# Patient Record
Sex: FEMALE | Race: WHITE | NOT HISPANIC OR LATINO | Employment: OTHER | URBAN - METROPOLITAN AREA
[De-identification: names, ages, dates, MRNs, and addresses within clinical notes are randomized per-mention and may not be internally consistent; named-entity substitution may affect disease eponyms.]

---

## 2017-01-06 ENCOUNTER — APPOINTMENT (OUTPATIENT)
Dept: OCCUPATIONAL THERAPY | Facility: CLINIC | Age: 62
End: 2017-01-06
Payer: COMMERCIAL

## 2017-01-16 ENCOUNTER — APPOINTMENT (OUTPATIENT)
Dept: OCCUPATIONAL THERAPY | Facility: CLINIC | Age: 62
End: 2017-01-16
Payer: COMMERCIAL

## 2017-01-16 PROCEDURE — 97010 HOT OR COLD PACKS THERAPY: CPT

## 2017-01-16 PROCEDURE — 97140 MANUAL THERAPY 1/> REGIONS: CPT

## 2017-01-16 PROCEDURE — 97110 THERAPEUTIC EXERCISES: CPT

## 2017-01-27 ENCOUNTER — APPOINTMENT (OUTPATIENT)
Dept: OCCUPATIONAL THERAPY | Facility: CLINIC | Age: 62
End: 2017-01-27
Payer: COMMERCIAL

## 2017-04-14 ENCOUNTER — ALLSCRIPTS OFFICE VISIT (OUTPATIENT)
Dept: OTHER | Facility: OTHER | Age: 62
End: 2017-04-14

## 2017-04-14 DIAGNOSIS — Z12.31 ENCOUNTER FOR SCREENING MAMMOGRAM FOR MALIGNANT NEOPLASM OF BREAST: ICD-10-CM

## 2017-04-14 DIAGNOSIS — I25.10 ATHEROSCLEROTIC HEART DISEASE OF NATIVE CORONARY ARTERY WITHOUT ANGINA PECTORIS: ICD-10-CM

## 2017-04-18 ENCOUNTER — LAB CONVERSION - ENCOUNTER (OUTPATIENT)
Dept: OTHER | Facility: OTHER | Age: 62
End: 2017-04-18

## 2017-04-18 LAB
A/G RATIO (HISTORICAL): 1.9 (CALC) (ref 1–2.5)
ALBUMIN SERPL BCP-MCNC: 4 G/DL (ref 3.6–5.1)
ALP SERPL-CCNC: 52 U/L (ref 33–130)
ALT SERPL W P-5'-P-CCNC: 13 U/L (ref 6–29)
AST SERPL W P-5'-P-CCNC: 13 U/L (ref 10–35)
BILIRUB SERPL-MCNC: 1 MG/DL (ref 0.2–1.2)
BUN SERPL-MCNC: 15 MG/DL (ref 7–25)
BUN/CREA RATIO (HISTORICAL): ABNORMAL (CALC) (ref 6–22)
CALCIUM SERPL-MCNC: 9.6 MG/DL (ref 8.6–10.4)
CHLORIDE SERPL-SCNC: 108 MMOL/L (ref 98–110)
CHOLEST SERPL-MCNC: 157 MG/DL (ref 125–200)
CHOLEST/HDLC SERPL: 2.1 (CALC)
CO2 SERPL-SCNC: 24 MMOL/L (ref 20–31)
CREAT SERPL-MCNC: 0.74 MG/DL (ref 0.5–0.99)
EGFR AFRICAN AMERICAN (HISTORICAL): 101 ML/MIN/1.73M2
EGFR-AMERICAN CALC (HISTORICAL): 87 ML/MIN/1.73M2
GAMMA GLOBULIN (HISTORICAL): 2.1 G/DL (CALC) (ref 1.9–3.7)
GLUCOSE (HISTORICAL): 100 MG/DL (ref 65–99)
HDLC SERPL-MCNC: 76 MG/DL
LDL CHOLESTEROL (HISTORICAL): 73 MG/DL (CALC)
NON-HDL-CHOL (CHOL-HDL) (HISTORICAL): 81 MG/DL (CALC)
POTASSIUM SERPL-SCNC: 4.7 MMOL/L (ref 3.5–5.3)
SODIUM SERPL-SCNC: 143 MMOL/L (ref 135–146)
TOTAL PROTEIN (HISTORICAL): 6.1 G/DL (ref 6.1–8.1)
TRIGL SERPL-MCNC: 41 MG/DL
TSH SERPL DL<=0.05 MIU/L-ACNC: 2 MIU/L (ref 0.4–4.5)

## 2017-04-28 ENCOUNTER — GENERIC CONVERSION - ENCOUNTER (OUTPATIENT)
Dept: OTHER | Facility: OTHER | Age: 62
End: 2017-04-28

## 2017-05-17 ENCOUNTER — HOSPITAL ENCOUNTER (OUTPATIENT)
Dept: NON INVASIVE DIAGNOSTICS | Facility: HOSPITAL | Age: 62
Discharge: HOME | End: 2017-05-17
Attending: FAMILY MEDICINE
Payer: COMMERCIAL

## 2017-05-17 DIAGNOSIS — I25.10 ATHEROSCLEROTIC HEART DISEASE OF NATIVE CORONARY ARTERY WITHOUT ANGINA PECTORIS: ICD-10-CM

## 2017-05-17 PROCEDURE — 93880 EXTRACRANIAL BILAT STUDY: CPT

## 2017-05-18 ENCOUNTER — HOSPITAL ENCOUNTER (OUTPATIENT)
Dept: MAMMOGRAPHY | Facility: HOSPITAL | Age: 62
Discharge: HOME | End: 2017-05-18
Payer: COMMERCIAL

## 2017-05-18 DIAGNOSIS — Z12.31 ENCOUNTER FOR SCREENING MAMMOGRAM FOR MALIGNANT NEOPLASM OF BREAST: ICD-10-CM

## 2017-05-18 PROCEDURE — G0202 SCR MAMMO BI INCL CAD: HCPCS

## 2017-05-31 ENCOUNTER — GENERIC CONVERSION - ENCOUNTER (OUTPATIENT)
Dept: OTHER | Facility: OTHER | Age: 62
End: 2017-05-31

## 2017-06-09 ENCOUNTER — TRANSCRIBE ORDERS (OUTPATIENT)
Dept: ADMINISTRATIVE | Facility: HOSPITAL | Age: 62
End: 2017-06-09

## 2017-06-09 ENCOUNTER — APPOINTMENT (OUTPATIENT)
Dept: LAB | Facility: HOSPITAL | Age: 62
End: 2017-06-09
Attending: INTERNAL MEDICINE
Payer: COMMERCIAL

## 2017-06-09 ENCOUNTER — ALLSCRIPTS OFFICE VISIT (OUTPATIENT)
Dept: OTHER | Facility: OTHER | Age: 62
End: 2017-06-09

## 2017-06-09 DIAGNOSIS — R73.01 IMPAIRED FASTING GLUCOSE: ICD-10-CM

## 2017-06-09 DIAGNOSIS — I25.10 ATHEROSCLEROTIC HEART DISEASE OF NATIVE CORONARY ARTERY WITHOUT ANGINA PECTORIS: ICD-10-CM

## 2017-06-09 LAB
EST. AVERAGE GLUCOSE BLD GHB EST-MCNC: 108 MG/DL
HBA1C MFR BLD: 5.4 % (ref 4.2–6.3)

## 2017-06-09 PROCEDURE — 83036 HEMOGLOBIN GLYCOSYLATED A1C: CPT

## 2017-06-09 PROCEDURE — 36415 COLL VENOUS BLD VENIPUNCTURE: CPT

## 2017-06-11 ENCOUNTER — GENERIC CONVERSION - ENCOUNTER (OUTPATIENT)
Dept: OTHER | Facility: OTHER | Age: 62
End: 2017-06-11

## 2017-09-21 ENCOUNTER — GENERIC CONVERSION - ENCOUNTER (OUTPATIENT)
Dept: OTHER | Facility: OTHER | Age: 62
End: 2017-09-21

## 2017-09-28 ENCOUNTER — HOSPITAL ENCOUNTER (EMERGENCY)
Facility: HOSPITAL | Age: 62
Discharge: HOME | End: 2017-09-28
Admitting: EMERGENCY MEDICINE
Payer: COMMERCIAL

## 2017-09-28 VITALS
DIASTOLIC BLOOD PRESSURE: 66 MMHG | TEMPERATURE: 97.6 F | HEART RATE: 63 BPM | RESPIRATION RATE: 16 BRPM | HEIGHT: 64 IN | OXYGEN SATURATION: 100 % | WEIGHT: 141.98 LBS | SYSTOLIC BLOOD PRESSURE: 121 MMHG | BODY MASS INDEX: 24.24 KG/M2

## 2017-09-28 DIAGNOSIS — K52.1 DIARRHEA DUE TO DRUG: Primary | ICD-10-CM

## 2017-09-28 DIAGNOSIS — R10.84 GENERALIZED ABDOMINAL PAIN: ICD-10-CM

## 2017-09-28 LAB
ALBUMIN SERPL BCP-MCNC: 3.2 G/DL (ref 3.5–5)
ALP SERPL-CCNC: 44 U/L (ref 46–116)
ALT SERPL W P-5'-P-CCNC: 26 U/L (ref 12–78)
ANION GAP SERPL CALCULATED.3IONS-SCNC: 7 MMOL/L (ref 4–13)
AST SERPL W P-5'-P-CCNC: 18 U/L (ref 5–45)
BACTERIA UR QL AUTO: ABNORMAL /HPF
BASOPHILS # BLD AUTO: 0.11 THOUSANDS/ΜL (ref 0–0.1)
BASOPHILS NFR BLD AUTO: 1 % (ref 0–1)
BILIRUB SERPL-MCNC: 0.3 MG/DL (ref 0.2–1)
BILIRUB UR QL STRIP: NEGATIVE
BUN SERPL-MCNC: 10 MG/DL (ref 5–25)
CALCIUM SERPL-MCNC: 8.9 MG/DL (ref 8.3–10.1)
CHLORIDE SERPL-SCNC: 107 MMOL/L (ref 100–108)
CLARITY UR: CLEAR
CLARITY, POC: CLEAR
CO2 SERPL-SCNC: 27 MMOL/L (ref 21–32)
COLOR UR: YELLOW
COLOR, POC: YELLOW
CREAT SERPL-MCNC: 0.69 MG/DL (ref 0.6–1.3)
EOSINOPHIL # BLD AUTO: 0.38 THOUSAND/ΜL (ref 0–0.61)
EOSINOPHIL NFR BLD AUTO: 5 % (ref 0–6)
ERYTHROCYTE [DISTWIDTH] IN BLOOD BY AUTOMATED COUNT: 13.3 % (ref 11.6–15.1)
EXT BILIRUBIN, UA: NEGATIVE
EXT BLOOD URINE: NEGATIVE
EXT GLUCOSE, UA: NEGATIVE
EXT KETONES: NEGATIVE
EXT NITRITE, UA: NEGATIVE
EXT PH, UA: 7
EXT PROTEIN, UA: NEGATIVE
EXT SPECIFIC GRAVITY, UA: 1.01
EXT UROBILINOGEN: 0.2
GFR SERPL CREATININE-BSD FRML MDRD: 94 ML/MIN/1.73SQ M
GLUCOSE SERPL-MCNC: 95 MG/DL (ref 65–140)
GLUCOSE UR STRIP-MCNC: NEGATIVE MG/DL
HCT VFR BLD AUTO: 41.9 % (ref 34.8–46.1)
HGB BLD-MCNC: 13.6 G/DL (ref 11.5–15.4)
HGB UR QL STRIP.AUTO: NEGATIVE
KETONES UR STRIP-MCNC: NEGATIVE MG/DL
LEUKOCYTE ESTERASE UR QL STRIP: ABNORMAL
LIPASE SERPL-CCNC: 78 U/L (ref 73–393)
LYMPHOCYTES # BLD AUTO: 2.43 THOUSANDS/ΜL (ref 0.6–4.47)
LYMPHOCYTES NFR BLD AUTO: 30 % (ref 14–44)
MAGNESIUM SERPL-MCNC: 1.8 MG/DL (ref 1.6–2.6)
MCH RBC QN AUTO: 28.8 PG (ref 26.8–34.3)
MCHC RBC AUTO-ENTMCNC: 32.5 G/DL (ref 31.4–37.4)
MCV RBC AUTO: 89 FL (ref 82–98)
MONOCYTES # BLD AUTO: 0.68 THOUSAND/ΜL (ref 0.17–1.22)
MONOCYTES NFR BLD AUTO: 8 % (ref 4–12)
NEUTROPHILS # BLD AUTO: 4.61 THOUSANDS/ΜL (ref 1.85–7.62)
NEUTS SEG NFR BLD AUTO: 56 % (ref 43–75)
NITRITE UR QL STRIP: NEGATIVE
NON-SQ EPI CELLS URNS QL MICRO: ABNORMAL /HPF
PH UR STRIP.AUTO: 7 [PH] (ref 4.5–8)
PLATELET # BLD AUTO: 216 THOUSANDS/UL (ref 149–390)
PMV BLD AUTO: 10.2 FL (ref 8.9–12.7)
POTASSIUM SERPL-SCNC: 4 MMOL/L (ref 3.5–5.3)
PROT SERPL-MCNC: 6.1 G/DL (ref 6.4–8.2)
PROT UR STRIP-MCNC: NEGATIVE MG/DL
RBC # BLD AUTO: 4.73 MILLION/UL (ref 3.81–5.12)
RBC #/AREA URNS AUTO: ABNORMAL /HPF
SODIUM SERPL-SCNC: 141 MMOL/L (ref 136–145)
SP GR UR STRIP.AUTO: 1.01 (ref 1–1.03)
URATE CRY URNS QL MICRO: ABNORMAL /HPF
UROBILINOGEN UR QL STRIP.AUTO: 0.2 E.U./DL
WBC # BLD AUTO: 8.21 THOUSAND/UL (ref 4.31–10.16)
WBC # BLD EST: NORMAL 10*3/UL
WBC #/AREA URNS AUTO: ABNORMAL /HPF

## 2017-09-28 PROCEDURE — 81002 URINALYSIS NONAUTO W/O SCOPE: CPT

## 2017-09-28 PROCEDURE — 83690 ASSAY OF LIPASE: CPT | Performed by: PHYSICIAN ASSISTANT

## 2017-09-28 PROCEDURE — 80053 COMPREHEN METABOLIC PANEL: CPT | Performed by: PHYSICIAN ASSISTANT

## 2017-09-28 PROCEDURE — 85025 COMPLETE CBC W/AUTO DIFF WBC: CPT | Performed by: PHYSICIAN ASSISTANT

## 2017-09-28 PROCEDURE — 36415 COLL VENOUS BLD VENIPUNCTURE: CPT | Performed by: PHYSICIAN ASSISTANT

## 2017-09-28 PROCEDURE — 81001 URINALYSIS AUTO W/SCOPE: CPT | Performed by: PHYSICIAN ASSISTANT

## 2017-09-28 PROCEDURE — 99284 EMERGENCY DEPT VISIT MOD MDM: CPT

## 2017-09-28 PROCEDURE — 96361 HYDRATE IV INFUSION ADD-ON: CPT

## 2017-09-28 PROCEDURE — 96374 THER/PROPH/DIAG INJ IV PUSH: CPT

## 2017-09-28 PROCEDURE — 83735 ASSAY OF MAGNESIUM: CPT | Performed by: PHYSICIAN ASSISTANT

## 2017-09-28 PROCEDURE — 96375 TX/PRO/DX INJ NEW DRUG ADDON: CPT

## 2017-09-28 RX ORDER — DIPHENOXYLATE HYDROCHLORIDE AND ATROPINE SULFATE 2.5; .025 MG/1; MG/1
1 TABLET ORAL 4 TIMES DAILY PRN
Qty: 20 TABLET | Refills: 0 | Status: SHIPPED | OUTPATIENT
Start: 2017-09-28 | End: 2017-10-03

## 2017-09-28 RX ORDER — KETOROLAC TROMETHAMINE 30 MG/ML
30 INJECTION, SOLUTION INTRAMUSCULAR; INTRAVENOUS ONCE
Status: COMPLETED | OUTPATIENT
Start: 2017-09-28 | End: 2017-09-28

## 2017-09-28 RX ORDER — ONDANSETRON 4 MG/1
4 TABLET, ORALLY DISINTEGRATING ORAL EVERY 6 HOURS PRN
Qty: 20 TABLET | Refills: 0 | Status: SHIPPED | OUTPATIENT
Start: 2017-09-28 | End: 2018-07-18

## 2017-09-28 RX ORDER — DIPHENOXYLATE HYDROCHLORIDE AND ATROPINE SULFATE 2.5; .025 MG/1; MG/1
1 TABLET ORAL ONCE
Status: COMPLETED | OUTPATIENT
Start: 2017-09-28 | End: 2017-09-28

## 2017-09-28 RX ORDER — LEVOTHYROXINE SODIUM 0.07 MG/1
75 TABLET ORAL DAILY
COMMUNITY
End: 2018-02-20 | Stop reason: SDUPTHER

## 2017-09-28 RX ORDER — ONDANSETRON 2 MG/ML
4 INJECTION INTRAMUSCULAR; INTRAVENOUS ONCE
Status: COMPLETED | OUTPATIENT
Start: 2017-09-28 | End: 2017-09-28

## 2017-09-28 RX ADMIN — ONDANSETRON 4 MG: 2 INJECTION INTRAMUSCULAR; INTRAVENOUS at 10:16

## 2017-09-28 RX ADMIN — SODIUM CHLORIDE 1000 ML: 0.9 INJECTION, SOLUTION INTRAVENOUS at 09:44

## 2017-09-28 RX ADMIN — DIPHENOXYLATE HYDROCHLORIDE AND ATROPINE SULFATE 1 TABLET: 2.5; .025 TABLET ORAL at 09:45

## 2017-09-28 RX ADMIN — KETOROLAC TROMETHAMINE 30 MG: 30 INJECTION, SOLUTION INTRAMUSCULAR at 09:45

## 2017-09-28 NOTE — ED PROVIDER NOTES
History  Chief Complaint   Patient presents with    Abdominal Pain     Pt c/o of diarrhea that started 5 days ago and now has abd pain mid epi gastric  Pt states last week she was prescribed augmentin and has had GI problems since  History provided by:  Patient   used: No    Abdominal Pain   Pain location:  Epigastric and LUQ  Pain quality: cramping and gnawing    Pain radiates to:  Does not radiate  Pain severity:  Moderate  Onset quality:  Gradual  Duration:  5 days  Timing:  Constant  Progression:  Worsening  Chronicity:  New  Context: not alcohol use, not awakening from sleep, not diet changes, not eating, not laxative use, not medication withdrawal, not previous surgeries, not recent illness, not recent sexual activity, not recent travel, not retching, not sick contacts, not suspicious food intake and not trauma    Context comment:  The patient was started on Augmentin for sinusitis 5 days ago, states that she started to have these abdominal symptoms in GI problems 5 days ago when she started the antibiotic  Associated symptoms: diarrhea    Associated symptoms: no anorexia, no belching, no chest pain, no chills, no constipation, no cough, no dysuria, no fatigue, no fever, no flatus, no hematemesis, no hematochezia, no hematuria, no melena, no nausea, no shortness of breath, no sore throat, no vaginal bleeding, no vaginal discharge and no vomiting    Risk factors: aspirin and multiple surgeries    Risk factors: no alcohol abuse, not elderly, no NSAID use, not obese, not pregnant and no recent hospitalization        Prior to Admission Medications   Prescriptions Last Dose Informant Patient Reported? Taking?    Cholecalciferol (VITAMIN D PO)   Yes Yes   Sig: Take by mouth   LORazepam (ATIVAN) 0 5 mg tablet   Yes No   Sig: Take 0 5 mg by mouth every 6 (six) hours as needed for anxiety   Omega-3 Fatty Acids (FISH OIL PO)   Yes No   Sig: Take by mouth   aspirin 81 MG tablet   Yes Yes Sig: Take 81 mg by mouth daily   atenolol (TENORMIN) 25 mg tablet   Yes Yes   Sig: Take 25 mg by mouth daily   levothyroxine 75 mcg tablet   Yes Yes   Sig: Take 75 mcg by mouth daily   rosuvastatin (CRESTOR) 20 MG tablet   Yes No   Sig: Take 20 mg by mouth daily      Facility-Administered Medications: None       Past Medical History:   Diagnosis Date    Anxiety     Coronary artery disease     Disease of thyroid gland        Past Surgical History:   Procedure Laterality Date    BREAST SURGERY      open biopsy    CORONARY ANGIOPLASTY WITH STENT PLACEMENT      GANGLION CYST EXCISION Right 12/8/2016    Procedure: EXCISION GANGLION CYST right first dorsal extensor compartment;  Surgeon: Dk Manuel MD;  Location:  MAIN OR;  Service:    Kumar De La Cruzman INCIS TENDON SHEATH,RADIAL STYLOID Right 12/8/2016    Procedure: Isi Mai;  Surgeon: Dk Manuel MD;  Location: [facility];  Service: Orthopedics    TONSILLECTOMY         Family History   Problem Relation Age of Onset    Heart disease Father      I have reviewed and agree with the history as documented  Social History   Substance Use Topics    Smoking status: Never Smoker    Smokeless tobacco: Not on file    Alcohol use No        Review of Systems   Constitutional: Positive for appetite change  Negative for chills, diaphoresis, fatigue and fever  HENT: Positive for sinus pressure  Negative for congestion, ear pain, sore throat and trouble swallowing  Eyes: Negative for photophobia and visual disturbance  Respiratory: Negative for cough, chest tightness, shortness of breath and wheezing  Cardiovascular: Negative for chest pain  Gastrointestinal: Positive for abdominal pain and diarrhea  Negative for anorexia, constipation, flatus, hematemesis, hematochezia, melena, nausea and vomiting  Genitourinary: Negative for dysuria, hematuria, vaginal bleeding and vaginal discharge     Musculoskeletal: Negative for myalgias and neck stiffness  Skin: Negative for color change, pallor and rash  Neurological: Negative for dizziness, weakness, light-headedness, numbness and headaches  Hematological: Negative for adenopathy  Physical Exam  ED Triage Vitals [09/28/17 0909]   Temperature Pulse Respirations Blood Pressure SpO2   97 6 °F (36 4 °C) 76 18 144/67 99 %      Temp Source Heart Rate Source Patient Position - Orthostatic VS BP Location FiO2 (%)   Oral Monitor Lying Right arm --      Pain Score       6           Physical Exam   Constitutional: She is oriented to person, place, and time  She appears well-developed and well-nourished  No distress  HENT:   Head: Normocephalic and atraumatic  Right Ear: External ear normal    Left Ear: External ear normal    Nose: Nose normal    Mouth/Throat: Mucous membranes are dry  Neck: Normal range of motion  Neck supple  Cardiovascular: Normal rate, regular rhythm, normal heart sounds and intact distal pulses  Exam reveals no friction rub  No murmur heard  Pulmonary/Chest: Effort normal and breath sounds normal  No respiratory distress  She has no wheezes  Abdominal: Soft  Bowel sounds are normal    Mild generalized tenderness to palpation on abdominal exam   Lymphadenopathy:     She has no cervical adenopathy  Neurological: She is alert and oriented to person, place, and time  She exhibits normal muscle tone  Coordination normal    Skin: Skin is warm and dry  Capillary refill takes less than 2 seconds  No rash noted  She is not diaphoretic  No pallor         ED Medications  Medications   sodium chloride 0 9 % bolus 1,000 mL (0 mL Intravenous Stopped 9/28/17 1044)   ketorolac (TORADOL) 30 mg/mL injection 30 mg (30 mg Intravenous Given 9/28/17 0945)   diphenoxylate-atropine (LOMOTIL) 2 5-0 025 mg per tablet 1 tablet (1 tablet Oral Given 9/28/17 0945)   ondansetron (ZOFRAN) injection 4 mg (4 mg Intravenous Given 9/28/17 1016)       Diagnostic Studies  Labs Reviewed   UA W REFLEX TO MICROSCOPIC WITH REFLEX TO CULTURE - Abnormal        Result Value Ref Range Status    Leukocytes, UA Small (*) Negative Final    Color, UA Yellow   Final    Clarity, UA Clear   Final    Specific [place], UA 1 010  1 003 - 1 030 Final    pH, UA 7 0  4 5 - 8 0 Final    Nitrite, UA Negative  Negative Final    Protein, UA Negative  Negative mg/dl Final    Glucose, UA Negative  Negative mg/dl Final    Ketones, UA Negative  Negative mg/dl Final    Urobilinogen, UA 0 2  0 2, 1 0 E U /dl E U /dl Final    Bilirubin, UA Negative  Negative Final    Blood, UA Negative  Negative Final   CBC AND DIFFERENTIAL - Abnormal     Basophils Absolute 0 11 (*) 0 00 - 0 10 Thousands/µL Final    WBC 8 21  4 31 - 10 16 Thousand/uL Final    RBC 4 73  3 81 - 5 12 Million/uL Final    Hemoglobin 13 6  11 5 - 15 4 g/dL Final    Hematocrit 41 9  34 8 - 46 1 % Final    MCV 89  82 - 98 fL Final    MCH 28 8  26 8 - 34 3 pg Final    MCHC 32 5  31 4 - 37 4 g/dL Final    RDW 13 3  11 6 - 15 1 % Final    MPV 10 2  8 9 - 12 7 fL Final    Platelets 129  941 - 390 Thousands/uL Final    Neutrophils Relative 56  43 - 75 % Final    Lymphocytes Relative 30  14 - 44 % Final    Monocytes Relative 8  4 - 12 % Final    Eosinophils Relative 5  0 - 6 % Final    Basophils Relative 1  0 - 1 % Final    Neutrophils Absolute 4 61  1 85 - 7 62 Thousands/µL Final    Lymphocytes Absolute 2 43  0 60 - 4 47 Thousands/µL Final    Monocytes Absolute 0 68  0 17 - 1 22 Thousand/µL Final    Eosinophils Absolute 0 38  0 00 - 0 61 Thousand/µL Final   COMPREHENSIVE METABOLIC PANEL - Abnormal     Alkaline Phosphatase 44 (*) 46 - 116 U/L Final    Total Protein 6 1 (*) 6 4 - 8 2 g/dL Final    Albumin 3 2 (*) 3 5 - 5 0 g/dL Final    Sodium 141  136 - 145 mmol/L Final    Potassium 4 0  3 5 - 5 3 mmol/L Final    Chloride 107  100 - 108 mmol/L Final    CO2 27  21 - 32 mmol/L Final    Anion Gap 7  4 - 13 mmol/L Final    BUN 10  5 - 25 mg/dL Final    Creatinine 0 69  0 60 - 1 30 mg/dL Final Comment: Standardized to IDMS reference method    Glucose 95  65 - 140 mg/dL Final    Comment:   If the patient is fasting, the ADA then defines impaired fasting glucose as > 100 mg/dL and diabetes as > or equal to 123 mg/dL  Specimen collection should occur prior to Sulfasalazine administration due to the potential for falsely depressed results  Specimen collection should occur prior to Sulfapyridine administration due to the potential for falsely elevated results  Calcium 8 9  8 3 - 10 1 mg/dL Final    AST 18  5 - 45 U/L Final    Comment:   Specimen collection should occur prior to Sulfasalazine administration due to the potential for falsely depressed results  ALT 26  12 - 78 U/L Final    Comment:   Specimen collection should occur prior to Sulfasalazine administration due to the potential for falsely depressed results  Total Bilirubin 0 30  0 20 - 1 00 mg/dL Final    eGFR 94  ml/min/1 73sq m Final    Narrative:     National Kidney Disease Education Program recommendations are as follows:  GFR calculation is accurate only with a steady state creatinine  Chronic Kidney disease less than 60 ml/min/1 73 sq  meters  Kidney failure less than 15 ml/min/1 73 sq  meters     URINE MICROSCOPIC - Abnormal     RBC, UA 0-1 (*) None Seen, 0-5 /hpf Final    WBC, UA 2-4 (*) None Seen, 0-5, 5-55, 5-65 /hpf Final    Epithelial Cells Occasional  None Seen, Occasional /hpf Final    Bacteria, UA Occasional  None Seen, Occasional /hpf Final    Uric Acid Otilia, UA Occasional  /hpf Final   MAGNESIUM - Normal    Magnesium 1 8  1 6 - 2 6 mg/dL Final   LIPASE - Normal    Lipase 78  73 - 393 u/L Final   POCT URINALYSIS DIPSTICK - Normal    Color, UA yellow   Final    Clarity, UA clear   Final    EXT Glucose, UA negative   Final    EXT Bilirubin, UA (Ref: Negative) negative   Final    EXT Ketones, UA (Ref: Negative) negative   Final    EXT Spec Grav, UA 1 010   Final    EXT Blood, UA (Ref: Negative) negative   Final    EXT pH, UA 7 0   Final    EXT Protein, UA (Ref: Negative) negative   Final    EXT Urobilinogen, UA (Ref: 0 2- 1 0) 0 2   Final    EXT Leukocytes, UA (Ref: Negative) MODERATE   Final    EXT Nitrite, UA (Ref: Negative) negative   Final       No orders to display       Procedures  Procedures      Phone Contacts  ED Phone Contact    ED Course  ED Course                                MDM  Number of Diagnoses or Management Options  Diarrhea due to drug: new and requires workup  Generalized abdominal pain: new and requires workup  Diagnosis management comments: The patient's lab results were unremarkable  She was discharged in no acute distress with supportive therapy and outpatient script for stool cultures and C diff testing  She was instructed to follow up with her PCP if no improvement in 3 4 days, or return to the ED if worsening symptoms develop  Amount and/or Complexity of Data Reviewed  Clinical lab tests: ordered and reviewed  Review and summarize past medical records: yes  Discuss the patient with other providers: yes      CritCare Time    Disposition  Final diagnoses:   Diarrhea due to drug   Generalized abdominal pain     ED Disposition     ED Disposition Condition Comment    Discharge  [address] discharge to home/self care      Condition at discharge: Stable        Follow-up Information     Follow up With Specialties Details Why Contact Info Additional [address] Emergency Department Emergency Medicine Go to If symptoms worsen [address] [facility]  AN ED, [address], [place], [place], [address]    Sam Dumont MD Family Medicine  If no improvement in 3-4 days [address] [facility] [address] [phone]           Discharge Medication List as of 9/28/2017 10:34 AM      START taking these medications    Details   diphenoxylate-atropine (LOMOTIL) 2 5-0 025 mg per tablet Take 1 tablet by mouth 4 (four) times a day as needed for diarrhea for up to 5 days, Starting u 9/28/2017, Until Tue 10/3/2017, Print      ondansetron (ZOFRAN-ODT) 4 mg disintegrating tablet Take 1 tablet by mouth every 6 (six) hours as needed for nausea or vomiting, Starting u 9/28/2017, Print         CONTINUE these medications which have NOT CHANGED    Details   aspirin 81 MG tablet Take 81 mg by mouth daily, Until Discontinued, Historical Med      atenolol (TENORMIN) 25 mg tablet Take 25 mg by mouth daily, Until Discontinued, Historical Med      Cholecalciferol (VITAMIN D PO) Take by mouth, Until Discontinued, Historical Med      levothyroxine 75 mcg tablet Take 75 mcg by mouth daily, Historical Med      LORazepam (ATIVAN) 0 5 mg tablet Take 0 5 mg by mouth every 6 (six) hours as needed for anxiety, Until Discontinued, Historical Med      Omega-3 Fatty Acids (FISH OIL PO) Take by mouth, Until Discontinued, Historical Med      rosuvastatin (CRESTOR) 20 MG tablet Take 20 mg by mouth daily, Until Discontinued, Historical Med             Outpatient Discharge Orders  FECAL LEUKOCYTES   Standing Status: Future  Standing Exp  Date: 09/28/18     Giardia antigen   Standing Status: Future  Standing Exp  Date: 09/28/18     Stool culture   Standing Status: Future  Standing Exp  Date: 09/28/18     Clostridium difficile toxin by PCR   Standing Status: Future  Standing Exp   Date: 09/28/18         ED Provider  Electronically Signed by       Desiree Nails PA-C  09/28/17 9460

## 2017-09-28 NOTE — DISCHARGE INSTRUCTIONS
Acute Diarrhea   WHAT YOU NEED TO KNOW:   Acute diarrhea starts quickly and lasts a short time, usually 1 to 3 days  It can last up to 2 weeks  You may not be able to control your diarrhea  Acute diarrhea usually stops on its own  DISCHARGE INSTRUCTIONS:   Return to the emergency department if:   · You feel confused  · Your heartbeat is faster than normal      · Your eyes look deeply sunken, or you have no tears when you cry  · You urinate less than usual, or your urine is dark yellow  · You have blood or mucus in your stools  · You have severe abdominal pain  · You are unable to drink any liquids  Contact your healthcare provider if:   · Your symptoms do not get better with treatment  · You have a fever higher than 101 3°F (38 5°C)  · You have trouble eating and drinking because you are vomiting  · You are thirsty or have a dry mouth  · Your diarrhea does not get better in 7 days  · You have questions or concerns about your condition or care  Follow up with your healthcare provider as directed:  Write down your questions so you remember to ask them during your visits  Medicines:  · Diarrhea medicine  is an over-the-counter medicine that helps slow or stop your diarrhea  If you take other medicines, talk to your healthcare provider before you take diarrhea medicine  · Antibiotics  may be given to help treat an infection caused by bacteria  · Antiparasitics  may be given to treat an infection caused by parasites  · Take your medicine as directed  Contact your healthcare provider if you think your medicine is not helping or if you have side effects  Tell him of her if you are allergic to any medicine  Keep a list of the medicines, vitamins, and herbs you take  Include the amounts, and when and why you take them  Bring the list or the pill bottles to follow-up visits  Carry your medicine list with you in case of an emergency    Self-care:   · Drink liquids as directed  Liquids will help prevent dehydration caused by diarrhea  Ask your healthcare provider how much liquid to drink each day and which liquids are best for you  You may need to drink an oral rehydration solution (ORS)  An ORS has the right amounts of water, salts, and sugar you need to replace body fluids  You can buy an ORS at most grocery stores and pharmacies  · Eat foods that are easy to digest   Examples include rice, lentils, cereal, bananas, potatoes, and bread  It also includes some fruits (bananas, melon), well-cooked vegetables, and lean meats  Avoid foods high in fiber, fat, and sugar  Also avoid caffeine, alcohol, dairy, and red meat until your diarrhea is gone  Prevent acute diarrhea:   · Wash your hands often  Use soap and water  Wash your hands before you eat or prepare food  Also wash your hands after you use the bathroom  Use an alcohol-based hand gel when soap and water are not available  · Keep bathroom surfaces clean  This helps prevent the spread of germs that cause acute diarrhea  · Wash fruits and vegetables well before you eat them  This can help remove germs that cause diarrhea  If possible, remove the skin from fruits and vegetables, or cook them well before you eat them  · Cook meat as directed  ¨ Cook ground meat  to 160°F      ¨ Cook ground poultry, whole poultry, or cuts of poultry  to at least 165°F  Remove the meat from heat  Let it stand for 3 minutes before you eat it  ¨ Cook whole cuts of meat other than poultry  to at least 145°F  Remove the meat from heat  Let it stand for 3 minutes before you eat it  · Wash dishes that have touched raw meat with hot water and soap  This includes cutting boards, utensils, dishes, and serving containers  · Place raw or cooked meat in the refrigerator as soon as possible  Bacteria can grow in meat that is left at room temperature too long  · Do not eat raw or undercooked oysters, clams, or mussels  These foods may be contaminated and cause infection  · Drink filtered or treated water only when you travel  Do not put ice in your drinks  Drink bottled water whenever possible  © 2017 [address] [employer] [employer] Information is for End User's use only and may not be sold, redistributed or otherwise used for commercial purposes  All illustrations and images included in CareNotes® are the copyrighted property of [employer]  or [employer]  The above information is an  only  It is not intended as medical advice for individual conditions or treatments  Talk to your doctor, nurse or pharmacist before following any medical regimen to see if it is safe and effective for you

## 2017-10-02 ENCOUNTER — GENERIC CONVERSION - ENCOUNTER (OUTPATIENT)
Dept: OTHER | Facility: OTHER | Age: 62
End: 2017-10-02

## 2017-10-02 ENCOUNTER — TRANSCRIBE ORDERS (OUTPATIENT)
Dept: LAB | Facility: CLINIC | Age: 62
End: 2017-10-02

## 2017-10-02 ENCOUNTER — APPOINTMENT (OUTPATIENT)
Dept: LAB | Facility: CLINIC | Age: 62
End: 2017-10-02
Payer: COMMERCIAL

## 2017-10-02 DIAGNOSIS — R19.7 DIARRHEA, UNSPECIFIED TYPE: Primary | ICD-10-CM

## 2017-10-02 DIAGNOSIS — R19.7 DIARRHEA, UNSPECIFIED TYPE: ICD-10-CM

## 2017-10-02 PROCEDURE — 87045 FECES CULTURE AEROBIC BACT: CPT

## 2017-10-02 PROCEDURE — 87015 SPECIMEN INFECT AGNT CONCNTJ: CPT

## 2017-10-02 PROCEDURE — 87046 STOOL CULTR AEROBIC BACT EA: CPT

## 2017-10-06 LAB
BACTERIA STL CULT: NORMAL
BACTERIA STL CULT: NORMAL

## 2017-10-11 ENCOUNTER — GENERIC CONVERSION - ENCOUNTER (OUTPATIENT)
Dept: OTHER | Facility: OTHER | Age: 62
End: 2017-10-11

## 2017-11-11 ENCOUNTER — ALLSCRIPTS OFFICE VISIT (OUTPATIENT)
Dept: OTHER | Facility: OTHER | Age: 62
End: 2017-11-11

## 2017-11-12 NOTE — PROGRESS NOTES
Assessment    1  Right maxillary sinusitis (473 0) (J32 0)   2  Retinopathy (362 10) (H35 00)   3  Acute diarrhea (787 91) (R19 7)   4  Arteriosclerosis of coronary artery (414 00) (I25 10)    Plan  Acute diarrhea, Right maxillary sinusitis    · Fluconazole 200 MG Oral Tablet; TAKE 1 TABLET DAILY  Right maxillary sinusitis    · Cefuroxime Axetil 500 MG Oral Tablet; TAKE 1 TABLET EVERY 12 HOURS DAILY   · CT SINUS WO CONTRAST; Status:Need Information - Financial Authorization; Requested for:11Nov2017;     Discussion/Summary    In summary then this is a 69-year-old woman who developed acute gastrointestinal syndrome after treatment with Augmentin  Attempts to obtain C diff toxin titer were not successful due to consistency of stool  Routine bacterial cultures were negative  Symptoms at this point have essentially resolved  She does appear to have a maxillary/ethmoidal sinusitis on the right  We are going to have her try Ceftin 500 b i d  She is also going to use probiotics and will also give her some Diflucan 2 you should she develop any GI symptomatology  She did have some issues with prolonged antibiotic treatment when she was treated for Lyme disease and she was given Diflucan which appeared to remedy her symptoms  We agreed to give her prescription for seen  She is going to be beginning immunotherapy with Humira for her hereditary retinal dystrophy  Need to have her sinusitis resolved  I believe that based on her prolonged symptoms and persistent findings that a CT scan of sinuses is appropriate to verify the diagnosis as well as determine the extent  Will attempt to have this authorized  In the meantime she is going to start the Ceftin number follow up with her next week  Chief Complaint  I still have pressure, pain and swelling in my face  Indicates R maxilla  Began after starting CellCept  Only had 3 doses of Augmentin  Had GI sx  that improved only 1 week ago  Took omeprazole  No loose stools or fever  Appetite diminished  Stopped omeprazole due to hair loss  No nasal d/c  R sided obstruction  Has PND  Otalgia R sided  Minimal cough and no fever  Off Cellcept wants to start Humira  History of Present Illness  The patient is a 50-year-old female here for follow-up of sinusitis symptoms  She was seen several weeks ago with what appeared to be a right maxillary sinusitis and given Augmentin  Within 2-3 days she developed abdominal pain and diarrhea  She had to stop the Augmentin  Her gastro intestinal symptoms subsided only slowly  Just recently she has returned to normal in regards to her GI symptoms  She continues to have right facial pain in the ethmoidal and maxillary region  She has had some symptoms of nasal obstruction  She also has some postnasal drip  Ophthalmology/rheumatology is considering starting her on Humira for her retinal disorder but she cannot start this until she is free of her sinusitis  She has had no fever and minimal cough  Review of Systems   Constitutional: feeling tired, but-- no fever,-- no recent weight gain-- and-- no recent weight loss  ENT: nasal discharge, but-- as noted in HPI  Cardiovascular: no chest pain-- and-- no lower extremity edema  Respiratory: no shortness of breath  Active Problems  1  Abnormal fasting glucose (790 29) (R73 01)   2  Acute diarrhea (787 91) (R19 7)   3  Anxiety (300 00) (F41 9)   4  Arteriosclerosis of coronary artery (414 00) (I25 10)   5  GERD without esophagitis (530 81) (K21 9)   6  Hypothyroidism, unspecified type (244 9) (E03 9)   7  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 01) (G47 01)   8  History of Presence of stent in LAD coronary artery (V45 82) (Z95 5)   9  Retinopathy (362 10) (H35 00)   10  Right maxillary sinusitis (473 0) (J32 0)   11  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)    Past Medical History  1  History of Ganglion cyst of wrist, right (727 41) (M67 431)   2   History of Presence of stent in LAD coronary artery (V45 82) (Z95 5)   3  History of Status post de Quervain's release surgery (V45 89) (U71 614)    Surgical History  1  History of Biopsy Breast Open   2  History of Tonsillectomy    Social History     · Never a smoker    Current Meds   1  Aspirin 81 MG CAPS; Therapy: (Recorded:58Ghz3636) to Recorded   2  Atenolol 25 MG Oral Tablet; take 1 and 1/2 tablets by mouth once daily; Therapy: 19Fak1452 to (Evaluate:10Mar2018)  Requested for: 65Edr4906; Last Rx:31Ldo2854 Ordered   3  Cefuroxime Axetil 500 MG Oral Tablet; TAKE 1 TABLET EVERY 12 HOURS DAILY; Therapy: 09XWJ2975 to (Evaluate:10Oct2017)  Requested for: 87JAP8618; Last Rx:84Dvz0055 Ordered   4  Co Q10 100 MG TABS; Therapy: (Recorded:68Njo0488) to Recorded   5  Eszopiclone 2 MG Oral Tablet; Take 1 tablet by mouth at bedtime; Therapy: 78XLE5754 to (Evaluate:89Rqh7916)  Requested for: 55WBA4367; Last Rx:55Dee3842 Ordered   6  Feosol 200 (65 Fe) MG Oral Tablet; Therapy: (Recorded:18Bwr5202) to Recorded   7  Fish Oil OIL; Therapy: (Recorded:53Ubm1275) to Recorded   8  LORazepam 0 5 MG Oral Tablet; Take 1 tablet daily  Requested for: 21Apr2017; Last Rx:48Lwi9127; Status: ACTIVE - Renewal Denied Ordered   9  LORazepam 0 5 MG Oral Tablet; TAKE 1 TABLET DAILY; Therapy: ([phone]) to Recorded   10  MetroNIDAZOLE 500 MG Oral Tablet; TAKE 1 TABLET 3 times daily; Therapy: 95WRG4427 to (Evaluate:10Fba1581)  Requested for: 57Psx0094; Last  Rx:94Cch6649 Ordered   11  Nitrostat 0 4 MG Sublingual Tablet Sublingual; DISSOLVE 1 TABLET UNDER THE  TONGUE AS NEEDED FOR CHEST PAIN;  Therapy: (Recorded:03Ekl9783) to Recorded   12  RaNITidine HCl - 300 MG Oral Tablet; TAKE 1 TABLET AT BEDTIME; Therapy: 84Bfq8120 to (Evaluate:11Oct2017)  Requested for: 69Iko6561; Last  Rx:81Vcs2389 Ordered   13  Rosuvastatin Calcium 20 MG Oral Tablet; take 1 tablet by mouth for 5 days and 2 tablets  by mouth for 2 days;   Therapy: 43OJR4665 to (Evaluate:10Mar2018)  Requested for: 22Aug4284; Last Rx:04Yal2506 Ordered   14  Synthroid 75 MCG Oral Tablet; take 1 tablet by mouth once daily; Therapy: 29HJY0677 to (988 76 832)  Requested for: 46WPO6431; Last  TJ:03HBK7272 Ordered   15  Vitamin B-12 1000 MCG Oral Tablet; Therapy: (Recorded:71Nqg9303) to Recorded   16  Vitamin D TABS; Therapy: (Recorded:21Nmw5489) to Recorded   17  Xifaxan 200 MG Oral Tablet; TAKE 1 TABLET 3 TIMES DAILY FOR 3 DAYS AS NEEDED  FOR DIARRHEA; Therapy: 74Qva0677 to (Evaluate:68Apq6067)  Requested for: 28Apr2017; Last  Rx:23Wke8038 Ordered    Allergies  1  Augmentin TABS    Vitals  Vital Signs    Recorded: 48EQF6969 11:48AM   Temperature 24 9 F   Systolic 506   Diastolic 78   Height 5 ft 3 in   Weight 145 lb    BMI Calculated 25 69   BSA Calculated 1 69       Physical Exam   Constitutional  General appearance: Abnormal  -- Appears fatigued  Eyes  Conjunctiva and lids: No swelling, erythema or discharge  Ears, Nose, Mouth, and Throat  External inspection of ears and nose: Abnormal  -- There specific tenderness of the right ethmoidal and right maxillary region  Extraocular movements are full there is no facial swelling present  Otoscopic examination: Tympanic membranes translucent with normal light reflex  Canals patent without erythema  Nasal mucosa, septum, and turbinates: Abnormal  -- Intranasal examination reveals it to be relatively normal on the left  Right side reveals significant erythema as well as edema and there is purulent appearing nasal discharge in the right nasal cavity  Pulmonary  Respiratory effort: No increased work of breathing or signs of respiratory distress  Auscultation of lungs: Clear to auscultation  -- Clear to auscultation  Cardiovascular  Auscultation of heart: Normal rate and rhythm, normal S1 and S2, without murmurs  -- Regular rhythm and a normal rate  Lymphatic  Palpation of lymph nodes in neck: No lymphadenopathy  -- No adenopathy    Psychiatric  Orientation to person, place, and time: Normal    Mood and affect: Abnormal  -- Mildly dysphoric affect  Future Appointments    Date/Time Provider Specialty Site   11/30/2017 02:20 PM POLO Aviles   Family Medicine [address] [place] [address]       Signatures   Electronically signed by : POLO Uribe ; Nov 11 2017 12:39PM EST                       (Author)

## 2017-12-04 ENCOUNTER — TRANSCRIBE ORDERS (OUTPATIENT)
Dept: ADMINISTRATIVE | Facility: HOSPITAL | Age: 62
End: 2017-12-04

## 2017-12-04 DIAGNOSIS — J32.0 RIGHT MAXILLARY SINUSITIS: Primary | ICD-10-CM

## 2017-12-13 ENCOUNTER — HOSPITAL ENCOUNTER (OUTPATIENT)
Dept: CT IMAGING | Facility: HOSPITAL | Age: 62
Discharge: HOME | End: 2017-12-13
Payer: COMMERCIAL

## 2017-12-13 ENCOUNTER — GENERIC CONVERSION - ENCOUNTER (OUTPATIENT)
Dept: OTHER | Facility: OTHER | Age: 62
End: 2017-12-13

## 2017-12-13 DIAGNOSIS — J32.0 RIGHT MAXILLARY SINUSITIS: ICD-10-CM

## 2017-12-13 PROCEDURE — 70486 CT MAXILLOFACIAL W/O DYE: CPT

## 2017-12-21 ENCOUNTER — GENERIC CONVERSION - ENCOUNTER (OUTPATIENT)
Dept: OTHER | Facility: OTHER | Age: 62
End: 2017-12-21

## 2018-01-10 NOTE — RESULT NOTES
Message   Please tell her that her blood work looks good including lipid profile and thyroid function  She also had normal CBC and CMP     Verified Results  (1) COMPREHENSIVE METABOLIC PANEL 22NOF0457 89:84LI Lora Em     Test Name Result Flag Reference   GLUCOSE 86 mg/dL  65-99   Fasting reference interval   UREA NITROGEN (BUN) 22 mg/dL  7-25   CREATININE 0 73 mg/dL  0 50-0 99   For patients >52years of age, the reference limit  for Creatinine is approximately 13% higher for people  identified as -American  eGFR NON-AFR  AMERICAN 89 mL/min/1 73m2  > OR = 60   eGFR AFRICAN AMERICAN 103 mL/min/1 73m2  > OR = 60   BUN/CREATININE RATIO   7-63   NOT APPLICABLE (calc)   SODIUM 141 mmol/L  135-146   POTASSIUM 4 9 mmol/L  3 5-5 3   CHLORIDE 104 mmol/L     CARBON DIOXIDE 26 mmol/L  20-31   CALCIUM 10 0 mg/dL  8 6-10 4   PROTEIN, TOTAL 6 7 g/dL  6 1-8 1   ALBUMIN 4 6 g/dL  3 6-5 1   GLOBULIN 2 1 g/dL (calc)  1 9-3 7   ALBUMIN/GLOBULIN RATIO 2 2 (calc)  1 0-2 5   BILIRUBIN, TOTAL 0 7 mg/dL  0 2-1 2   ALKALINE PHOSPHATASE 72 U/L     AST 17 U/L  10-35   ALT 17 U/L  6-29     (1) LIPID PANEL, FASTING 06Cro5234 12:00AM Masood Gutierrez     Test Name Result Flag Reference   CHOLESTEROL, TOTAL 193 mg/dL  125-200   HDL CHOLESTEROL 91 mg/dL  > OR = 46   TRIGLICERIDES 52 mg/dL  <174   LDL-CHOLESTEROL 92 mg/dL (calc)  <130   Desirable range <100 mg/dL for patients with CHD or  diabetes and <70 mg/dL for diabetic patients with  known heart disease  CHOL/HDLC RATIO 2 1 (calc)  < OR = 5 0   NON HDL CHOLESTEROL 102 mg/dL (calc)     Target for non-HDL cholesterol is 30 mg/dL higher than   LDL cholesterol target       (Q) CBC (H/H, RBC, INDICES, WBC, PLT) 53Udg5644 12:00AM Masood Gutierrez     Test Name Result Flag Reference   WHITE BLOOD CELL COUNT 5 4 Thousand/uL  3 8-10 8   RED BLOOD CELL COUNT 4 91 Million/uL  3 80-5 10   HEMOGLOBIN 14 3 g/dL  11 7-15 5   HEMATOCRIT 43 8 %  35 0-45 0   MCV 89 4 fL  80 0-100 0   MCH 29 1 pg  27 0-33 0   MCHC 32 6 g/dL  32 0-36 0   RDW 13 4 %  11 0-15 0   PLATELET COUNT 828 Thousand/uL  140-400   MPV 9 1 fL  7 5-11 5     (Q) THYROID PANEL 41Nkj3119 12:00AM Masood Gutierrez     Test Name Result Flag Reference   T3 UPTAKE 31 %  22-35   T4 (THYROXINE), TOTAL 8 8 mcg/dL  4 5-12 0   FREE T4 INDEX (T7) 2 7  1 4-3 8     (Q) TSH, 3RD GENERATION W/REFLEX TO FT4 12Sep2016 12:00AM Masood Gutierrez     Test Name Result Flag Reference   TSH W/REFLEX TO FT4 2 10 mIU/L  0 40-4 50

## 2018-01-11 NOTE — RESULT NOTES
Verified Results  * MAMMO SCREENING BILATERAL W CAD 68SPF4803 07:52AM Jeanne Pantoja Order Number: [id number]    - Patient Instructions: To schedule this appointment, please contact Central Scheduling at [phone]  Do not wear any perfume, powder, lotion or deodorant on breast or underarm area  Please bring your doctors order, referral (if needed) and insurance information with you on the day of the test  Failure to bring this information may result in this test being rescheduled  Arrive 15 minutes prior to your appointment time to register  On the day of your test, please bring any prior mammogram or breast studies with you that were not performed at a [facility]  Failure to bring prior exams may result in your test needing to be rescheduled  Test Name Result Flag Reference   MAMMO SCREENING BILATERAL W CAD (Report)     Patient History:   Patient is postmenopausal    No known family history of cancer  Patient has never smoked  Patient's BMI is 26 4  Reason for exam: screening, asymptomatic  Mammo Screening Bilateral W CAD: May 18, 2017 - Check In #:    [de-identified]   Bilateral CC and MLO view(s) were taken  Technologist: IDALIA García (IDALIA)(M)   Prior study comparison: August 20, 2015, bilateral mammogram,    performed at [facility]  December 18, 2013, diagnostic mammogram, performed at [facility]  June 22, 2012, bilateral mammogram,    performed at [facility]  There are scattered fibroglandular densities  The parenchymal pattern appears stable  No dominant soft tissue    mass or suspicious calcifications are noted  The skin and nipple   contours are within normal limits  No mammographic evidence of malignancy  No    significant changes when compared with prior studies       ACR BI-RADSï¾® Assessments: BiRad:1 - Negative     Recommendation:   Routine screening mammogram in 1 year  A reminder letter will be   scheduled  Analyzed by CAD     8-10% of cancers will be missed on mammography  Management of a    palpable abnormality must be based on clinical grounds  Patients   will be notified of their results via letter from our facility  Accredited by [employer] of Radiology and FDA  Transcription Location: IDALIA Holcomb 98: [id number]     Risk Value(s):   Tyrer-Cuzick 10 Year: 3 500%, Tyrer-Cuzick Lifetime: 8 100%,    Myriad Table: 1 5%, DIANA 5 Year: 1 7%, NCI Lifetime: 7 8%   Signed by:   Ivone Gallardo MD   5/31/17     VAS CAROTID COMPLETE STUDY 94YQT4224 01:59PM Omar Gave Order Number: [id number]    - Patient Instructions: To schedule this appointment, please contact Central Scheduling at [phone]  Test Name Result Flag Reference   VAS CAROTID COMPLETE STUDY (Report)     [facility] REPORT   CLINICAL:   Indications: Patient presents for a general health evaluation secondary to   other cardiovascular symptoms  Patient is asymptomatic from a cerebral vascular   standpoint  Risk Factors   The patient has history of hyperlipidemia  Clinical   Right Brachial Pressure: 110/70 mmHg     Left Brachial Pressure: 110/70   mmHg  FINDINGS:      Right    Impression PSV EDV (cm/s) Ratio    Dist  ICA         57     17  0 64    Mid  ICA         49     16  0 55    Prox  ICA  Normal    63     14  0 71    Dist CCA         84     25        Mid CCA          89     16  1 13    Prox CCA         79     17        Ext Carotid        89      9  1 00    Prox Vert         47     15        Subclavian        102      0           Left     Impression PSV EDV (cm/s) Ratio    Dist  ICA        110     44  1 15    Mid  ICA         75     31  0 79    Prox   ICA  Normal    68     27  0 71    Dist CCA         92     32        Mid CCA          95     27  0 78    Prox CCA         123     33        Ext Carotid        78     21  0 82    Prox Vert         55 17        Subclavian        98      0                 CONCLUSION:   Impression   RIGHT:   There is no evidence of significant stenosis noted  Vertebral artery flow is antegrade  There is no significant subclavian artery   disease  LEFT:   There is no evidence of significant stenosis noted  Vertebral artery flow is antegrade  There is no significant subclavian artery   disease  Internal carotid artery stenosis determination by consensus criteria from:   Bebo Guerrero et al  Carotid Artery Stenosis: Gray-Scale and Doppler US Diagnosis   - Society of Radiologists in [address] [place] [address], Radiology 2003;   [phone]        SIGNATURE:   Electronically Signed by: Mohan Caldwell MD on 2017-05-18 09:47:58 AM

## 2018-01-12 VITALS
DIASTOLIC BLOOD PRESSURE: 62 MMHG | SYSTOLIC BLOOD PRESSURE: 110 MMHG | BODY MASS INDEX: 28.34 KG/M2 | WEIGHT: 160 LBS | HEART RATE: 69 BPM

## 2018-01-12 NOTE — RESULT NOTES
Verified Results  * XR WRIST 3+ VIEW RIGHT 00Dgv2087 12:46PM Yu Alexander Order Number: [id number]     Test Name Result Flag Reference   XR WRIST 3+ VW RIGHT (Report)     RIGHT WRIST     INDICATION:  Right wrist pain  COMPARISON: None     VIEWS: 4; 4 images     FINDINGS:     There is no acute fracture or dislocation  No degenerative changes  No lytic or blastic lesions are seen  Soft tissues are unremarkable  IMPRESSION:     No acute osseous abnormality         Workstation performed: [id number]     Signed by:   Rm Vaca MD   9/17/16       Plan  Wrist pain, chronic, right    · *1 - [facility] Physician Referral  Consult  Status: Active  Requested  for: 63PNG2352  Care Summary provided  : Yes

## 2018-01-13 VITALS
TEMPERATURE: 98.6 F | HEIGHT: 63 IN | DIASTOLIC BLOOD PRESSURE: 80 MMHG | BODY MASS INDEX: 27.64 KG/M2 | WEIGHT: 156 LBS | SYSTOLIC BLOOD PRESSURE: 140 MMHG

## 2018-01-13 VITALS
SYSTOLIC BLOOD PRESSURE: 112 MMHG | BODY MASS INDEX: 25.69 KG/M2 | HEIGHT: 63 IN | WEIGHT: 145 LBS | TEMPERATURE: 98.7 F | DIASTOLIC BLOOD PRESSURE: 78 MMHG

## 2018-01-13 NOTE — RESULT NOTES
Verified Results  (1) HEMOGLOBIN A1C 68CTX2733 10:51AM Mirna Riding   TW Order Number: [id number]     Test Name Result Flag Reference   HEMOGLOBIN A1C 5 4 %  4 2-6 3   EST  AVG   GLUCOSE 108 mg/dl

## 2018-01-15 NOTE — RESULT NOTES
Verified Results  (1) STOOL CULTURE 82BGX3519 06:08PM Lethaniel Na     Test Name Result Flag Reference   CLINICAL REPORT (Report)     Test:        Stool culture  Specimen Source:  Rectum  Specimen Type:   Stool  Specimen Date:   10/2/2017 6:08 PM  Result Date:    10/6/2017 11:09 AM  Result Status:   Final result  Resulting Lab:   [facility] [address] [address]            Tel: [phone]      CULTURE                                       ------------------                                   No Salmonella, Shigella or Campylobacter isolated  Shiga Toxin 1 NOT detected, Shiga Toxin 2 NOT detected

## 2018-01-16 NOTE — RESULT NOTES
Verified Results  (1) COMPREHENSIVE METABOLIC PANEL [id number] 67:71PF Luis Guillen     Test Name Result Flag Reference   GLUCOSE 100 mg/dL H 65-99   Fasting reference interval     For someone without known diabetes, a glucose value  between 100 and 125 mg/dL is consistent with  prediabetes and should be confirmed with a  follow-up test    UREA NITROGEN (BUN) 15 mg/dL  7-25   CREATININE 0 74 mg/dL  0 50-0 99   For patients >52years of age, the reference limit  for Creatinine is approximately 13% higher for people  identified as -American  eGFR NON-AFR  AMERICAN 87 mL/min/1 73m2  > OR = 60   eGFR AFRICAN AMERICAN 101 mL/min/1 73m2  > OR = 60   BUN/CREATININE RATIO   3-01   NOT APPLICABLE (calc)   SODIUM 143 mmol/L  135-146   POTASSIUM 4 7 mmol/L  3 5-5 3   CHLORIDE 108 mmol/L     CARBON DIOXIDE 24 mmol/L  20-31   CALCIUM 9 6 mg/dL  8 6-10 4   PROTEIN, TOTAL 6 1 g/dL  6 1-8 1   ALBUMIN 4 0 g/dL  3 6-5 1   GLOBULIN 2 1 g/dL (calc)  1 9-3 7   ALBUMIN/GLOBULIN RATIO 1 9 (calc)  1 0-2 5   BILIRUBIN, TOTAL 1 0 mg/dL  0 2-1 2   ALKALINE PHOSPHATASE 52 U/L     AST 13 U/L  10-35   ALT 13 U/L  6-29     (1) LIPID PANEL, FASTING 14Apr2017 12:00AM Masood Gutierrez     Test Name Result Flag Reference   CHOLESTEROL, TOTAL 157 mg/dL  125-200   HDL CHOLESTEROL 76 mg/dL  > OR = 46   TRIGLICERIDES 41 mg/dL  <182   LDL-CHOLESTEROL 73 mg/dL (calc)  <130   Desirable range <100 mg/dL for patients with CHD or  diabetes and <70 mg/dL for diabetic patients with  known heart disease  CHOL/HDLC RATIO 2 1 (calc)  < OR = 5 0   NON HDL CHOLESTEROL 81 mg/dL (calc)     Target for non-HDL cholesterol is 30 mg/dL higher than   LDL cholesterol target       (Q) CBC (H/H, RBC, INDICES, WBC, PLT) 14Apr2017 12:00AM Masood Gutierrez     Test Name Result Flag Reference   WHITE BLOOD CELL COUNT 6 2 Thousand/uL  3 8-10 8   RED BLOOD CELL COUNT 4 67 Million/uL  3 80-5 10   HEMOGLOBIN 13 6 g/dL  11 7-15 5   HEMATOCRIT 41 2 %  35 0-45 0   MCV 88 3 fL  80 0-100 0   MCH 29 1 pg  27 0-33 0   MCHC 32 9 g/dL  32 0-36 0   RDW 13 3 %  11 0-15 0   PLATELET COUNT 207 Thousand/uL  140-400   WE RECEIVED YOUR HANDWRITTEN TEST ORDER AND  PERFORMED A HEMOGRAM WITH A PLATELET WITHOUT  A DIFFERENTIAL  IF THIS IS NOT WHAT YOU INTENDED  TO ORDER, PLEASE CONTACT YOUR LOCAL CLIENT SERVICE  REPRESENTATIVE IMMEDIATELY SO THAT WE CAN   ADJUST OUR BILLING APPROPRIATELY  YOU MAY ALSO   INQUIRE ABOUT ALTERNATIVE OR ADDITIONAL TESTING     MPV 9 1 fL  7 5-12 5           (Q) TSH, 3RD GENERATION W/REFLEX TO FT4 14Apr2017 12:00AM Masood Gutierrez     Test Name Result Flag Reference   TSH W/REFLEX TO FT4 2 00 mIU/L  0 40-4 50

## 2018-01-22 VITALS
DIASTOLIC BLOOD PRESSURE: 82 MMHG | SYSTOLIC BLOOD PRESSURE: 128 MMHG | BODY MASS INDEX: 24.27 KG/M2 | HEIGHT: 63 IN | WEIGHT: 137 LBS | TEMPERATURE: 97.9 F

## 2018-01-22 VITALS
BODY MASS INDEX: 24.8 KG/M2 | SYSTOLIC BLOOD PRESSURE: 130 MMHG | TEMPERATURE: 98.4 F | WEIGHT: 140 LBS | DIASTOLIC BLOOD PRESSURE: 80 MMHG | HEIGHT: 63 IN

## 2018-01-23 NOTE — RESULT NOTES
Verified Results  CT SINUS WO CONTRAST [id number] 05:53AM Abigail Malloy     Test Name Result Flag Reference   CT SINUS WO CONTRAST (Report)     CT SINUSES     INDICATION: Chronic maxillary sinusitis  COMPARISON: None  TECHNIQUE: Axial CT imaging through the sinuses was performed  In addition, sagittal and coronal reformatted images were submitted for interpretation  Radiation dose length product (DLP) for this visit: 365 mGy-cm   This examination, like all CT scans performed in the [facility], was performed utilizing techniques to minimize radiation dose exposure, including the use of iterative    reconstruction and automated exposure control  IMAGE QUALITY: Diagnostic  FINDINGS:      FRONTAL SINUSES: Clear  ETHMOID AIR CELLS: Clear  MAXILLARY SINUSES: Clear  SPHENOID SINUSES: Clear  NASAL SEPTUM AND CAVITY: Deviation of the septum towards the right  Normal nasal cavity  ANTERIOR OSTEOMEATAL COMPLEX: Patent  OSSEOUS STRUCTURES: No bony erosion or destruction  Normal cribiform plate and Planum Spendoidale  Visulaized mastoid temporal bones are clear  The bony walls of the carotid canals are intact  SOFT TISSUES: Normal        IMPRESSION:     Clear paranasal sinuses  Deviation the nasal septum towards the right         Workstation performed: [id number]     Signed by:   Leena Oliveira DO   12/13/17

## 2018-02-20 DIAGNOSIS — E03.9 HYPOTHYROIDISM, UNSPECIFIED TYPE: Primary | ICD-10-CM

## 2018-02-20 DIAGNOSIS — I25.10 ARTERIOSCLEROSIS OF CORONARY ARTERY: ICD-10-CM

## 2018-02-20 PROBLEM — G93.9 BRAIN LESION: Status: ACTIVE | Noted: 2017-06-19

## 2018-02-20 PROBLEM — G47.01 INSOMNIA DUE TO MEDICAL CONDITION: Status: ACTIVE | Noted: 2017-08-14

## 2018-02-20 PROBLEM — H35.00 RETINOPATHY OF BOTH EYES: Status: ACTIVE | Noted: 2017-05-02

## 2018-02-20 PROBLEM — B94.8 POST-LYME DISEASE SYNDROME: Status: ACTIVE | Noted: 2017-05-02

## 2018-02-20 PROBLEM — K21.9 GERD WITHOUT ESOPHAGITIS: Status: ACTIVE | Noted: 2017-04-14

## 2018-02-20 PROBLEM — G89.4 CHRONIC PAIN DISORDER: Status: ACTIVE | Noted: 2017-05-11

## 2018-02-20 RX ORDER — LEVOTHYROXINE SODIUM 75 MCG
TABLET ORAL
Qty: 90 TABLET | Refills: 1 | Status: SHIPPED | OUTPATIENT
Start: 2018-02-20 | End: 2018-05-04 | Stop reason: SDUPTHER

## 2018-02-20 RX ORDER — ATENOLOL 25 MG/1
TABLET ORAL
Qty: 135 TABLET | Refills: 1 | Status: SHIPPED | OUTPATIENT
Start: 2018-02-20 | End: 2018-03-15 | Stop reason: SDUPTHER

## 2018-03-15 ENCOUNTER — OFFICE VISIT (OUTPATIENT)
Dept: FAMILY MEDICINE CLINIC | Facility: CLINIC | Age: 63
End: 2018-03-15
Payer: COMMERCIAL

## 2018-03-15 VITALS
BODY MASS INDEX: 27.28 KG/M2 | TEMPERATURE: 98.2 F | WEIGHT: 154 LBS | DIASTOLIC BLOOD PRESSURE: 62 MMHG | SYSTOLIC BLOOD PRESSURE: 115 MMHG

## 2018-03-15 DIAGNOSIS — G47.01 INSOMNIA DUE TO MEDICAL CONDITION: ICD-10-CM

## 2018-03-15 DIAGNOSIS — F41.9 ANXIETY: ICD-10-CM

## 2018-03-15 DIAGNOSIS — E03.9 HYPOTHYROIDISM, UNSPECIFIED TYPE: Primary | ICD-10-CM

## 2018-03-15 DIAGNOSIS — E78.5 HYPERLIPIDEMIA, UNSPECIFIED HYPERLIPIDEMIA TYPE: ICD-10-CM

## 2018-03-15 DIAGNOSIS — I25.10 ARTERIOSCLEROSIS OF CORONARY ARTERY: ICD-10-CM

## 2018-03-15 DIAGNOSIS — H30.123: ICD-10-CM

## 2018-03-15 DIAGNOSIS — K21.9 GERD WITHOUT ESOPHAGITIS: ICD-10-CM

## 2018-03-15 PROBLEM — H53.413 SCOTOMA INVOLVING CENTRAL AREA OF BOTH EYES: Status: ACTIVE | Noted: 2017-12-07

## 2018-03-15 PROBLEM — H40.043 STEROID RESPONDERS TO GLAUCOMA OF BOTH EYES: Status: ACTIVE | Noted: 2017-12-07

## 2018-03-15 PROBLEM — H53.19 PHOTOPSIA: Status: ACTIVE | Noted: 2017-12-07

## 2018-03-15 PROBLEM — H53.143 PHOTOPHOBIA OF BOTH EYES: Status: ACTIVE | Noted: 2017-12-07

## 2018-03-15 PROBLEM — H43.393 VITREOUS FLOATERS OF BOTH EYES: Status: ACTIVE | Noted: 2017-12-07

## 2018-03-15 PROBLEM — G50.0 TRIGEMINAL NEURALGIA PAIN: Status: ACTIVE | Noted: 2017-12-07

## 2018-03-15 PROBLEM — H35.53: Status: ACTIVE | Noted: 2017-04-06

## 2018-03-15 LAB
ALBUMIN SERPL-MCNC: 4 G/DL (ref 3.6–5.1)
ALBUMIN/GLOB SERPL: 1.8 (CALC) (ref 1–2.5)
ALP SERPL-CCNC: 58 U/L (ref 33–130)
ALT SERPL-CCNC: 17 U/L (ref 6–29)
AST SERPL-CCNC: 16 U/L (ref 10–35)
BASOPHILS # BLD AUTO: 61 CELLS/UL (ref 0–200)
BASOPHILS NFR BLD AUTO: 1 %
BILIRUB SERPL-MCNC: 0.9 MG/DL (ref 0.2–1.2)
BUN SERPL-MCNC: 18 MG/DL (ref 7–25)
BUN/CREAT SERPL: NORMAL (CALC) (ref 6–22)
CALCIUM SERPL-MCNC: 9.3 MG/DL (ref 8.6–10.4)
CHLORIDE SERPL-SCNC: 107 MMOL/L (ref 98–110)
CO2 SERPL-SCNC: 29 MMOL/L (ref 20–31)
CREAT SERPL-MCNC: 0.71 MG/DL (ref 0.5–0.99)
CRP SERPL-MCNC: 1.4 MG/L
EOSINOPHIL # BLD AUTO: 281 CELLS/UL (ref 15–500)
EOSINOPHIL NFR BLD AUTO: 4.6 %
ERYTHROCYTE [DISTWIDTH] IN BLOOD BY AUTOMATED COUNT: 13 % (ref 11–15)
ERYTHROCYTE [SEDIMENTATION RATE] IN BLOOD BY WESTERGREN METHOD: 28 MM/H
GLOBULIN SER CALC-MCNC: 2.2 G/DL (CALC) (ref 1.9–3.7)
GLUCOSE SERPL-MCNC: 88 MG/DL (ref 65–99)
HCT VFR BLD AUTO: 39.3 % (ref 35–45)
HGB BLD-MCNC: 12.9 G/DL (ref 11.7–15.5)
LYMPHOCYTES # BLD AUTO: 2129 CELLS/UL (ref 850–3900)
LYMPHOCYTES NFR BLD AUTO: 34.9 %
MCH RBC QN AUTO: 29.3 PG (ref 27–33)
MCHC RBC AUTO-ENTMCNC: 32.8 G/DL (ref 32–36)
MCV RBC AUTO: 89.1 FL (ref 80–100)
MONOCYTES # BLD AUTO: 573 CELLS/UL (ref 200–950)
MONOCYTES NFR BLD AUTO: 9.4 %
NEUTROPHILS # BLD AUTO: 3056 CELLS/UL (ref 1500–7800)
NEUTROPHILS NFR BLD AUTO: 50.1 %
PLATELET # BLD AUTO: 235 THOUSAND/UL (ref 140–400)
PMV BLD REES-ECKER: 10.4 FL (ref 7.5–12.5)
POTASSIUM SERPL-SCNC: 3.9 MMOL/L (ref 3.5–5.3)
PROT SERPL-MCNC: 6.2 G/DL (ref 6.1–8.1)
RBC # BLD AUTO: 4.41 MILLION/UL (ref 3.8–5.1)
SL AMB EGFR AFRICAN AMERICAN: 105 ML/MIN/1.73M2
SL AMB EGFR NON AFRICAN AMERICAN: 91 ML/MIN/1.73M2
SODIUM SERPL-SCNC: 142 MMOL/L (ref 135–146)
WBC # BLD AUTO: 6.1 THOUSAND/UL (ref 3.8–10.8)

## 2018-03-15 PROCEDURE — 99214 OFFICE O/P EST MOD 30 MIN: CPT | Performed by: FAMILY MEDICINE

## 2018-03-15 RX ORDER — ATOVAQUONE AND PROGUANIL HYDROCHLORIDE 250; 100 MG/1; MG/1
TABLET, FILM COATED ORAL
COMMUNITY
Start: 2016-11-04 | End: 2018-03-15

## 2018-03-15 RX ORDER — GABAPENTIN 100 MG/1
100 CAPSULE ORAL
COMMUNITY
End: 2018-03-15 | Stop reason: ALTCHOICE

## 2018-03-15 RX ORDER — UBIDECARENONE 10 MG
CAPSULE ORAL
COMMUNITY
End: 2018-03-15

## 2018-03-15 RX ORDER — ROSUVASTATIN CALCIUM 20 MG/1
20 TABLET, COATED ORAL DAILY
Qty: 30 TABLET | Refills: 5 | Status: SHIPPED | OUTPATIENT
Start: 2018-03-15 | End: 2018-03-19

## 2018-03-15 RX ORDER — METRONIDAZOLE 500 MG/1
1 TABLET ORAL 3 TIMES DAILY
COMMUNITY
Start: 2017-10-02 | End: 2018-03-15 | Stop reason: ALTCHOICE

## 2018-03-15 RX ORDER — AZITHROMYCIN 500 MG/1
TABLET, FILM COATED ORAL
COMMUNITY
Start: 2016-11-04 | End: 2018-03-15

## 2018-03-15 RX ORDER — RANITIDINE 300 MG/1
300 TABLET ORAL
Qty: 30 TABLET | Refills: 5 | Status: SHIPPED | OUTPATIENT
Start: 2018-03-15 | End: 2019-01-28 | Stop reason: SDUPTHER

## 2018-03-15 RX ORDER — GLUCOSAMINE HCL 500 MG
TABLET ORAL
COMMUNITY
End: 2020-04-14 | Stop reason: ALTCHOICE

## 2018-03-15 RX ORDER — UBIDECARENONE 75 MG
CAPSULE ORAL
COMMUNITY
End: 2018-03-15

## 2018-03-15 RX ORDER — LANOLIN ALCOHOL/MO/W.PET/CERES
CREAM (GRAM) TOPICAL
COMMUNITY
End: 2020-12-11 | Stop reason: ALTCHOICE

## 2018-03-15 RX ORDER — ASPIRIN 81 MG/1
TABLET ORAL
COMMUNITY
End: 2018-03-15

## 2018-03-15 RX ORDER — RIBOFLAVIN (VITAMIN B2) 100 MG
500 TABLET ORAL DAILY
COMMUNITY
End: 2020-12-07 | Stop reason: ALTCHOICE

## 2018-03-15 RX ORDER — RANITIDINE 300 MG/1
1 TABLET ORAL
COMMUNITY
Start: 2017-04-14 | End: 2018-03-15 | Stop reason: SDUPTHER

## 2018-03-15 RX ORDER — NITROGLYCERIN 0.4 MG/1
1 TABLET SUBLINGUAL
COMMUNITY
End: 2019-05-20 | Stop reason: SDUPTHER

## 2018-03-15 RX ORDER — CEFUROXIME AXETIL 500 MG/1
500 TABLET ORAL 2 TIMES DAILY
COMMUNITY
End: 2018-03-15

## 2018-03-15 RX ORDER — LORAZEPAM 0.5 MG/1
0.5 TABLET ORAL
Qty: 30 TABLET | Refills: 5 | OUTPATIENT
Start: 2018-03-15 | End: 2019-07-19 | Stop reason: SDUPTHER

## 2018-03-15 RX ORDER — ESZOPICLONE 2 MG/1
1 TABLET, FILM COATED ORAL
COMMUNITY
Start: 2017-03-13 | End: 2018-04-02 | Stop reason: ALTCHOICE

## 2018-03-15 RX ORDER — ATENOLOL 25 MG/1
37.5 TABLET ORAL DAILY
Qty: 135 TABLET | Refills: 0 | Status: SHIPPED | OUTPATIENT
Start: 2018-03-15 | End: 2018-03-20 | Stop reason: SDUPTHER

## 2018-03-15 RX ORDER — FLUCONAZOLE 200 MG/1
1 TABLET ORAL DAILY
COMMUNITY
Start: 2017-11-11 | End: 2018-03-15 | Stop reason: ALTCHOICE

## 2018-03-15 NOTE — ASSESSMENT & PLAN NOTE
Will get T3-T4 and TSH today to evaluate her thyroid status  She may be somewhat under replaced by history and examination today

## 2018-03-15 NOTE — PROGRESS NOTES
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  She is going to begin Humira through Ophthalmology/Rheumatology    Hyperlipidemia  She continues on Crestor due to her history of coronary disease and hyperlipidemia  She came to us on the unusual dosage of 20 mg daily with 40 twice a week  There has been some issue with her insurance coverage  Will try to have this authorized at the patient and 's request though I a m  not sure of the real utility of this regimen  Diagnoses and all orders for this visit:    Hypothyroidism, unspecified type  -     TSH, 3rd generation with T4 reflex; Future  -     T3; Future    Arteriosclerosis of coronary artery  -     TSH, 3rd generation with T4 reflex; Future  -     Lipid panel; Future  -     atenolol (TENORMIN) 25 mg tablet; Take 1 5 tablets (37 5 mg total) by mouth daily  -     rosuvastatin (CRESTOR) 20 MG tablet; Take 1 tablet (20 mg total) by mouth daily    Insomnia due to medical condition    Anxiety  -     LORazepam (ATIVAN) 0 5 mg tablet; Take 1 tablet (0 5 mg total) by mouth daily at bedtime    GERD without esophagitis  -     ranitidine (ZANTAC) 300 MG tablet; Take 1 tablet (300 mg total) by mouth daily at bedtime    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    Hyperlipidemia, unspecified hyperlipidemia type    Other orders  -     Discontinue: Adalimumab 40 MG/0 8ML PNKT; Inject 80 mg (2 pens) under the skin, then 40 mg (one pen) every 2 weeks beginning one week after the initial dose    -     Discontinue: aspirin 81 MG tablet; Take by mouth  -     Discontinue: ATENOLOL PO; Take 37 5 mg by mouth  -     Coenzyme Q10 100 MG TABS; Take by mouth  -     eszopiclone (LUNESTA) 2 mg tablet; Take 1 tablet by mouth  -     Ferrous Sulfate Dried (FEOSOL) 200 (65 Fe) MG TABS; Take by mouth  -     FISH OIL-KRILL OIL PO; by Does not apply route  -     Discontinue: fluconazole (DIFLUCAN) 200 mg tablet; Take 1 tablet by mouth daily  -     Discontinue: gabapentin (NEURONTIN) 100 mg capsule; Take 100 mg by mouth  -     Discontinue: metroNIDAZOLE (FLAGYL) 500 mg tablet; Take 1 tablet by mouth 3 (three) times a day  -     nitroglycerin (NITROSTAT) 0 4 mg SL tablet; Place 1 tablet under the tongue  -     Discontinue: ranitidine (ZANTAC) 300 MG tablet; Take 1 tablet by mouth  -     cyanocobalamin (VITAMIN B-12) 1,000 mcg tablet; Take by mouth  -     cholecalciferol (VITAMIN D3) 1,000 units tablet; Take by mouth  -     rifaximin (XIFAXAN) 200 mg tablet; Take by mouth every 8 (eight) hours  -     Discontinue: aspirin (ECOTRIN LOW STRENGTH) 81 mg EC tablet; Take by mouth  -     Discontinue: atovaquone-proguanil (MALARONE) 250-100 mg;   -     Discontinue: azithromycin (ZITHROMAX) 500 MG tablet;   -     Discontinue: cefuroxime (CEFTIN) 500 mg tablet; Take 500 mg by mouth 2 (two) times a day  -     Discontinue: Cholecalciferol 2000 units TABS; Take by mouth  -     Discontinue: Coenzyme Q10 10 MG capsule; Take by mouth  -     Discontinue: cyanocobalamin (VITAMIN B-12) 100 mcg tablet; Take by mouth  -     Ascorbic Acid (VITAMIN C) 100 MG tablet; Take 500 mg by mouth daily          Subjective:   Chief Complaint   Patient presents with    Medication Refill     FBW     Starting Humira but has not due ? Over sinusitis  CT was negative in 12/17  I still feel stuffy  No nasal d/c or PND  No sneezing  Ocular pruritus  No fever or otalgia  No real cough  Bitemporal Has  And feels it may be withdrawal from sleeping pills  None for 3 days  Humira for uveitis  Opthalmology Dr Yoder Promise  I feel so fatigued and my hair is falling out  Coarse and dry  BM are more frequent since beginning Metamucil but was constipated previously  14 pound weight gain since fall   Patient ID: Anne Fair is a 60 y o  female  HPI  The patient is a 28-year-old female here for follow-up of multiple medical problems which include hypothyroidism, hyperlipidemia, anxiety as well as insomnia in addition to coronary disease  She states that Ophthalmology from [address] [place] [address] wants to start her on Humira for her uveitis  She is going to be monitored locally for this by Rheumatology at the [facility] [facility]  Prior to this they have concerns that her history of sinusitis is not active  She did have a CT scan in December of 2017 which we reviewed and showed no evidence of sinusitis  She states that she feels a little bit stuffy  She has had no nasal discharge or postnasal drip  She has had no sneezing  She has had some ocular pruritus  She has had no fever and no otalgia  She has had no significant cough  She has some bitemporal headaches at times but no periorbital or paranasal headache  She has discontinued Lunesta over the past several days as she does not want to stay on this chronically and feels like she is sleeping fairly well  She complains of fatigue and loss of hair  She is concerned about her dose of thyroxine but also that there may be other issues at play  She has had a 14 lb weight gain since the fall  She has had no chest pain shortness of breath PND orthopnea edema X cetera  She did have CBC and CMP as well as sed rate performed for Ophthalmology and Rheumatology in anticipation of beginning Humira            The following portions of the patient's history were reviewed and updated as appropriate: allergies, current medications, past family history, past medical history, past social history, past surgical history and problem list     Review of Systems   Constitution: Positive for malaise/fatigue and weight gain  Negative for decreased appetite, fever and night sweats  HENT: Negative for congestion, ear pain, odynophagia and sore throat  Cardiovascular: Negative for chest pain, dyspnea on exertion, irregular heartbeat, leg swelling, orthopnea and paroxysmal nocturnal dyspnea  Respiratory: Negative for cough, hemoptysis and shortness of breath  Endocrine: Negative for polydipsia, polyphagia and polyuria  Hematologic/Lymphatic: Negative for adenopathy  Skin: Positive for dry skin and unusual hair distribution  Negative for itching, nail changes and rash  Gastrointestinal: Negative for constipation  Objective:    Physical Exam   Constitutional: She is oriented to person, place, and time  She appears well-developed and well-nourished  Appears somewhat fatigued   HENT:   Mouth/Throat: Oropharynx is clear and moist  No oropharyngeal exudate  There is no paranasal tenderness  Nasal mucosa is minimally boggy but there is no significant her induration and no purulent drainage in the nasal cavities  TMs appear normal bilaterally  Eyes: Conjunctivae are normal  Right eye exhibits no discharge  Left eye exhibits no discharge  No scleral icterus  Neck: Neck supple  No thyromegaly present  Cardiovascular: Normal rate, regular rhythm and normal heart sounds  Exam reveals no gallop  No murmur heard  Pulmonary/Chest: Effort normal and breath sounds normal  No respiratory distress  She has no wheezes  She has no rales  Musculoskeletal: She exhibits no edema  Lymphadenopathy:     She has no cervical adenopathy  Neurological: She is alert and oriented to person, place, and time  Psychiatric:   Mildly dysphoric affect

## 2018-03-15 NOTE — ASSESSMENT & PLAN NOTE
She continues on Crestor due to her history of coronary disease and hyperlipidemia  She came to us on the unusual dosage of 20 mg daily with 40 twice a week  There has been some issue with her insurance coverage  Will try to have this authorized at the patient and 's request though I a m  not sure of the real utility of this regimen

## 2018-03-19 DIAGNOSIS — E78.5 HYPERLIPIDEMIA, UNSPECIFIED HYPERLIPIDEMIA TYPE: Primary | ICD-10-CM

## 2018-03-19 RX ORDER — ROSUVASTATIN CALCIUM 20 MG/1
TABLET, COATED ORAL
Qty: 38 TABLET | Refills: 5 | Status: SHIPPED | OUTPATIENT
Start: 2018-03-19 | End: 2018-03-20 | Stop reason: CLARIF

## 2018-03-20 DIAGNOSIS — I25.10 ARTERIOSCLEROSIS OF CORONARY ARTERY: ICD-10-CM

## 2018-03-20 RX ORDER — ATENOLOL 25 MG/1
37.5 TABLET ORAL DAILY
Qty: 135 TABLET | Refills: 0 | Status: SHIPPED | OUTPATIENT
Start: 2018-03-20 | End: 2018-08-29 | Stop reason: SDUPTHER

## 2018-04-02 ENCOUNTER — OFFICE VISIT (OUTPATIENT)
Dept: FAMILY MEDICINE CLINIC | Facility: CLINIC | Age: 63
End: 2018-04-02
Payer: COMMERCIAL

## 2018-04-02 VITALS
SYSTOLIC BLOOD PRESSURE: 136 MMHG | DIASTOLIC BLOOD PRESSURE: 80 MMHG | HEIGHT: 64 IN | BODY MASS INDEX: 26.8 KG/M2 | WEIGHT: 157 LBS

## 2018-04-02 DIAGNOSIS — E03.9 HYPOTHYROIDISM, UNSPECIFIED TYPE: ICD-10-CM

## 2018-04-02 DIAGNOSIS — F41.9 ANXIETY: ICD-10-CM

## 2018-04-02 DIAGNOSIS — F33.1 MODERATE EPISODE OF RECURRENT MAJOR DEPRESSIVE DISORDER (HCC): Primary | ICD-10-CM

## 2018-04-02 PROCEDURE — 99214 OFFICE O/P EST MOD 30 MIN: CPT | Performed by: FAMILY MEDICINE

## 2018-04-02 RX ORDER — OMEGA-3/DHA/EPA/FISH OIL 60 MG-90MG
500 CAPSULE ORAL
COMMUNITY
Start: 2018-03-29 | End: 2018-08-03 | Stop reason: ALTCHOICE

## 2018-04-02 RX ORDER — ROSUVASTATIN CALCIUM 20 MG/1
TABLET, FILM COATED ORAL
Refills: 0 | COMMUNITY
Start: 2018-03-30 | End: 2018-07-30 | Stop reason: SDUPTHER

## 2018-04-02 RX ORDER — FLUOXETINE 10 MG/1
10 TABLET, FILM COATED ORAL DAILY
Qty: 30 TABLET | Refills: 1 | Status: SHIPPED | OUTPATIENT
Start: 2018-04-02 | End: 2018-07-12 | Stop reason: SDUPTHER

## 2018-04-02 NOTE — PATIENT INSTRUCTIONS
We are going to refer the patient to Endocrinology for further evaluation of her hypothyroidism to which she agrees  We also discussed that she appears to suffer from anxiety depression and has a history of same  She agrees to a trial of antidepressant which we discussed at length today  We did discussed potential for suicidal ideation as well as fatigue GI side effects, insomnia X cetera  Will see her back in 3-4 weeks or she will call sooner as needed  She agrees

## 2018-04-02 NOTE — PROGRESS NOTES
Assessment/Plan: Moderate episode of recurrent major depressive disorder ([address] )  The patient is currently suffering from severe fatigue, myalgias as well as lack of concentration  She does have a history of depression and I believe this is the most likely source of her present symptomatology  We did review her recent blood work  We are going to start her on fluoxetine 10 mg daily and see her back in 3 weeks  We discussed potential side effects such as suicidality as well as gastrointestinal side effects, insomnia X cetera  Should she experience any untoward side effects she will call otherwise will see her back for follow-up visit to which she agrees  Hypothyroidism  She appears to have adequately replaced hypothyroidism and I believe she is most likely euthyroid presently  We are going to refer her to Endocrinology for further evaluation at her request          Diagnoses and all orders for this visit:    Moderate episode of recurrent major depressive disorder (HCC)    Anxiety  -     FLUoxetine (PROzac) 10 MG tablet; Take 1 tablet (10 mg total) by mouth daily    Hypothyroidism, unspecified type  -     Ambulatory referral to Endocrinology; Future    Other orders  -     CRESTOR 20 MG tablet; take 1 tablet by mouth daily for 5 days then 2 tablets daily for 2 days  -     Omega-3 Fatty Acids (FISH OIL) 500 MG CAPS; Take 500 mg by mouth          Subjective:   Chief Complaint   Patient presents with    Fatigue    Generalized Body Aches   157 was 145 11/17     Patient ID: Janelle Thompson is a 60 y o  female  I feel tired, I am in a brain fog and I feel lousy  HPI  The patient is a 28-year-old female with a history of hypothyroidism as well as coronary atherosclerosis in addition to autoimmune retinopathy/retinal dystrophy among multiple other problems presents today stating that she is severely fatigued, achy and cannot concentrate    She also complains of thin hair for many months and believes that her thyroid is the source of her symptoms  She did have thyroid function testing recently through her rheumatologist which was essentially normal   She does admit to decreased appetite, weight gain as well as fatigue and malaise  She also complains of cold intolerance and polydipsia  She has arthralgias and myalgias and is dizzy and lightheaded  When I inquired she does admit to anxiety depression  She was treated for depression when her children moved out of the house in the past and felt that antidepressants were effective for relief of her symptoms  The following portions of the patient's history were reviewed and updated as appropriate: allergies, current medications, past medical history, past social history and problem list     Review of Systems   Constitution: Positive for decreased appetite, malaise/fatigue and weight gain  Negative for night sweats  Cardiovascular: Negative for chest pain and leg swelling  Respiratory: Negative for cough, hemoptysis, sleep disturbances due to breathing and wheezing  Endocrine: Positive for cold intolerance and polydipsia  Negative for polyphagia and polyuria  Hematologic/Lymphatic: Negative for adenopathy and bleeding problem  Does not bruise/bleed easily  Skin: Negative for suspicious lesions  Musculoskeletal: Positive for joint pain  Wrists and fingers  No AM gelation  Gastrointestinal: Positive for anorexia and constipation  Negative for bowel incontinence, diarrhea and hematochezia  Genitourinary: Negative for bladder incontinence  Neurological: Positive for dizziness and light-headedness  Negative for headaches  Psychiatric/Behavioral: Positive for depression  The patient is nervous/anxious  The patient does not have insomnia  Objective:    Physical Exam   Constitutional: She is oriented to person, place, and time  She appears well-developed and well-nourished  Mildly overweight   Neck: Normal range of motion  Neck supple  No JVD present  No thyromegaly present  Cardiovascular: Normal rate and regular rhythm  Heart rate in the 70s   Pulmonary/Chest: Effort normal and breath sounds normal  She has no wheezes  She has no rales  Musculoskeletal: She exhibits no edema  Lymphadenopathy:     She has no cervical adenopathy  Neurological: She is alert and oriented to person, place, and time     Psychiatric:   Depressed affect

## 2018-04-02 NOTE — ASSESSMENT & PLAN NOTE
The patient is currently suffering from severe fatigue, myalgias as well as lack of concentration  She does have a history of depression and I believe this is the most likely source of her present symptomatology  We did review her recent blood work  We are going to start her on fluoxetine 10 mg daily and see her back in 3 weeks  We discussed potential side effects such as suicidality as well as gastrointestinal side effects, insomnia X cetera  Should she experience any untoward side effects she will call otherwise will see her back for follow-up visit to which she agrees

## 2018-04-02 NOTE — ASSESSMENT & PLAN NOTE
She appears to have adequately replaced hypothyroidism and I believe she is most likely euthyroid presently    We are going to refer her to Endocrinology for further evaluation at her request

## 2018-04-03 LAB
CHOLEST SERPL-MCNC: 186 MG/DL
CHOLEST/HDLC SERPL: 2.1 (CALC)
HDLC SERPL-MCNC: 89 MG/DL
LDLC SERPL CALC-MCNC: 83 MG/DL (CALC)
NONHDLC SERPL-MCNC: 97 MG/DL (CALC)
REF LAB TEST NAME: NORMAL
REF LAB TEST: NORMAL
SL AMB CLIENT CONTACT: NORMAL
T3 SERPL-MCNC: 86 NG/DL (ref 76–181)
TRIGL SERPL-MCNC: 56 MG/DL
TSH SERPL-ACNC: 2.74 MIU/L (ref 0.4–4.5)

## 2018-04-11 DIAGNOSIS — Z12.39 BREAST CANCER SCREENING: Primary | ICD-10-CM

## 2018-05-01 ENCOUNTER — OFFICE VISIT (OUTPATIENT)
Dept: URGENT CARE | Facility: CLINIC | Age: 63
End: 2018-05-01
Payer: COMMERCIAL

## 2018-05-01 VITALS
RESPIRATION RATE: 16 BRPM | OXYGEN SATURATION: 97 % | BODY MASS INDEX: 28.58 KG/M2 | SYSTOLIC BLOOD PRESSURE: 118 MMHG | WEIGHT: 167.4 LBS | HEART RATE: 73 BPM | DIASTOLIC BLOOD PRESSURE: 68 MMHG | TEMPERATURE: 97.5 F | HEIGHT: 64 IN

## 2018-05-01 DIAGNOSIS — R35.0 URINE FREQUENCY: Primary | ICD-10-CM

## 2018-05-01 LAB
SL AMB  POCT GLUCOSE, UA: NEGATIVE
SL AMB LEUKOCYTE ESTERASE,UA: ABNORMAL
SL AMB POCT BILIRUBIN,UA: ABNORMAL
SL AMB POCT BLOOD,UA: NEGATIVE
SL AMB POCT CLARITY,UA: CLEAR
SL AMB POCT COLOR,UA: ABNORMAL
SL AMB POCT KETONES,UA: NEGATIVE
SL AMB POCT NITRITE,UA: NEGATIVE
SL AMB POCT PH,UA: 6
SL AMB POCT SPECIFIC GRAVITY,UA: 1.01
SL AMB POCT URINE PROTEIN: NEGATIVE
SL AMB POCT UROBILINOGEN: 0.2

## 2018-05-01 PROCEDURE — G0382 LEV 3 HOSP TYPE B ED VISIT: HCPCS | Performed by: FAMILY MEDICINE

## 2018-05-01 PROCEDURE — 87086 URINE CULTURE/COLONY COUNT: CPT | Performed by: FAMILY MEDICINE

## 2018-05-01 RX ORDER — ADALIMUMAB 40MG/0.8ML
KIT SUBCUTANEOUS
COMMUNITY
Start: 2018-04-05 | End: 2018-07-18

## 2018-05-01 RX ORDER — CIPROFLOXACIN 500 MG/1
500 TABLET, FILM COATED ORAL EVERY 12 HOURS SCHEDULED
Qty: 6 TABLET | Refills: 0 | Status: SHIPPED | OUTPATIENT
Start: 2018-05-01 | End: 2018-05-04

## 2018-05-01 NOTE — PROGRESS NOTES
[facility]        NAME: Mayra Jules is a 60 y o  female  : 1955    MRN: [id number]  DATE: May 1, 2018  TIME: 5:20 PM    Assessment and Plan   Urine frequency [R35 0]  1  Urine frequency  POCT urine dip    ciprofloxacin (CIPRO) 500 mg tablet     Urine dip showed trace leukocytes  Patient hx more consistent with stress incontinence but will treat for UTI considering hx foul odor and leukocytes in urine  Will send culture  Patient Instructions   Take cipro as directed (treating empirically pending culture results)  Urine culture sent  Instructed patient to follow up with PCP in 3-5 days if no improvement in sx regarding stress incontinence  Chief Complaint     Chief Complaint   Patient presents with    Possible UTI     c/o discharge, frequent urination, denies fevers         History of Present Illness       Patient presents with 4 day hx of "leakage" that "feels like urine " It occurs more frequently when patient is coughing, sneezing or lifting something heavy  She admits to frequency, back pain x 7 days, and an odor to her urine  She denies fevers, chills, dysuria, abdominal pain or prior episodes like this  She takes humira for vision issues  She has had 4 children  Review of Systems   Review of Systems   Genitourinary: Positive for frequency  Negative for decreased urine volume, dysuria, enuresis, flank pain, hematuria, pelvic pain and urgency           Current Medications       Current Outpatient Prescriptions:     Ascorbic Acid (VITAMIN C) 100 MG tablet, Take 500 mg by mouth daily, Disp: , Rfl:     aspirin 81 MG tablet, Take 81 mg by mouth daily, Disp: , Rfl:     atenolol (TENORMIN) 25 mg tablet, Take 1 5 tablets (37 5 mg total) by mouth daily, Disp: 135 tablet, Rfl: 0    cholecalciferol (VITAMIN D3) 1,000 units tablet, Take by mouth, Disp: , Rfl:     Coenzyme Q10 100 MG TABS, Take by mouth, Disp: , Rfl:     CRESTOR 20 MG tablet, take 1 tablet by mouth daily for 5 days then 2 tablets daily for 2 days, Disp: , Rfl: 0    cyanocobalamin (VITAMIN B-12) 1,000 mcg tablet, Take by mouth, Disp: , Rfl:     LORazepam (ATIVAN) 0 5 mg tablet, Take 1 tablet (0 5 mg total) by mouth daily at bedtime, Disp: 30 tablet, Rfl: 5    nitroglycerin (NITROSTAT) 0 4 mg SL tablet, Place 1 tablet under the tongue, Disp: , Rfl:     ranitidine (ZANTAC) 300 MG tablet, Take 1 tablet (300 mg total) by mouth daily at bedtime, Disp: 30 tablet, Rfl: 5    SYNTHROID 75 MCG tablet, TAKE 1 TABLET DAILY, Disp: 90 tablet, Rfl: 1    ciprofloxacin (CIPRO) 500 mg tablet, Take 1 tablet (500 mg total) by mouth every 12 (twelve) hours for 3 days, Disp: 6 tablet, Rfl: 0    Ferrous Sulfate Dried (FEOSOL) 200 (65 Fe) MG TABS, Take by mouth, Disp: , Rfl:     FISH OIL-KRILL OIL PO, by Does not apply route, Disp: , Rfl:     FLUoxetine (PROzac) 10 MG tablet, Take 1 tablet (10 mg total) by mouth daily, Disp: 30 tablet, Rfl: 1    HUMIRA PEN 40 MG/0 8ML PNKT, , Disp: , Rfl:     Omega-3 Fatty Acids (FISH OIL) 500 MG CAPS, Take 500 mg by mouth, Disp: , Rfl:     ondansetron (ZOFRAN-ODT) 4 mg disintegrating tablet, Take 1 tablet by mouth every 6 (six) hours as needed for nausea or vomiting, Disp: 20 tablet, Rfl: 0    rifaximin (XIFAXAN) 200 mg tablet, Take by mouth every 8 (eight) hours, Disp: , Rfl:     Current Allergies     Allergies as of 05/01/2018 - Reviewed 05/01/2018   Allergen Reaction Noted    Tetracaine  11/30/2016    Amoxicillin-pot clavulanate  09/25/2017    Benzalkonium chloride Other (See Comments) 04/23/2017            The following portions of the patient's history were reviewed and updated as appropriate: allergies, current medications, past family history, past medical history, past social history, past surgical history and problem list      Past Medical History:   Diagnosis Date    Anxiety     Coronary artery disease     Disease of thyroid gland     Ganglion cyst of wrist, right     last assessed nov 4 2016    Presence of stent in LAD coronary artery     last assessed april 14 2017    Status post de Quervain's release surgery     last assessed dec 16 2016       Past Surgical History:   Procedure Laterality Date    BREAST SURGERY      open biopsy    CORONARY ANGIOPLASTY WITH STENT PLACEMENT      GANGLION CYST EXCISION Right 12/8/2016    Procedure: EXCISION GANGLION CYST right first dorsal extensor compartment;  Surgeon: Pablo Plasencia MD;  Location: [facility];  Service:    Juan R Whaley INCIS TENDON SHEATH,RADIAL STYLOID Right 12/8/2016    Procedure: Lilibeth Ybarra;  Surgeon: Pablo Plasencia MD;  Location: [facility];  Service: Orthopedics    TONSILLECTOMY         Family History   Problem Relation Age of Onset    Heart disease Father     Hyperlipidemia Sister     Thyroid disease Sister     Stroke Brother          Medications have been verified  Objective   /68   Pulse 73   Temp 97 5 °F (36 4 °C) (Tympanic)   Resp 16   Ht 5' 4" (1 626 m)   Wt 75 9 kg (167 lb 6 4 oz)   SpO2 97%   BMI 28 73 kg/m²        Physical Exam     Physical Exam   Constitutional: She appears well-developed and well-nourished  No distress  Abdominal: Soft  She exhibits no distension  There is no tenderness  There is no rebound and no CVA tenderness

## 2018-05-01 NOTE — PATIENT INSTRUCTIONS
Take cipro as directed (treating empirically pending culture results)  Urine culture sent  Instructed patient to follow up with PCP in 3-5 days if no improvement in sx regarding stress incontinence

## 2018-05-02 LAB — BACTERIA UR CULT: NORMAL

## 2018-05-04 DIAGNOSIS — E03.9 HYPOTHYROIDISM, UNSPECIFIED TYPE: ICD-10-CM

## 2018-05-04 RX ORDER — LEVOTHYROXINE SODIUM 75 MCG
75 TABLET ORAL DAILY
Qty: 90 TABLET | Refills: 0 | Status: SHIPPED | OUTPATIENT
Start: 2018-05-04 | End: 2018-08-22

## 2018-06-12 ENCOUNTER — HOSPITAL ENCOUNTER (OUTPATIENT)
Dept: MAMMOGRAPHY | Facility: HOSPITAL | Age: 63
Discharge: HOME | End: 2018-06-12
Payer: COMMERCIAL

## 2018-06-12 DIAGNOSIS — Z12.39 BREAST CANCER SCREENING: ICD-10-CM

## 2018-06-12 PROCEDURE — 77067 SCR MAMMO BI INCL CAD: CPT

## 2018-06-13 LAB
ALBUMIN SERPL-MCNC: 4.1 G/DL (ref 3.6–5.1)
ALBUMIN/GLOB SERPL: 1.7 (CALC) (ref 1–2.5)
ALP SERPL-CCNC: 67 U/L (ref 33–130)
ALT SERPL-CCNC: 16 U/L (ref 6–29)
AST SERPL-CCNC: 17 U/L (ref 10–35)
BASOPHILS # BLD AUTO: 40 CELLS/UL (ref 0–200)
BASOPHILS NFR BLD AUTO: 0.8 %
BILIRUB SERPL-MCNC: 0.9 MG/DL (ref 0.2–1.2)
BUN SERPL-MCNC: 19 MG/DL (ref 7–25)
BUN/CREAT SERPL: NORMAL (CALC) (ref 6–22)
CALCIUM SERPL-MCNC: 9.5 MG/DL (ref 8.6–10.4)
CHLORIDE SERPL-SCNC: 104 MMOL/L (ref 98–110)
CO2 SERPL-SCNC: 28 MMOL/L (ref 20–31)
CREAT SERPL-MCNC: 0.73 MG/DL (ref 0.5–0.99)
EOSINOPHIL # BLD AUTO: 100 CELLS/UL (ref 15–500)
EOSINOPHIL NFR BLD AUTO: 2 %
ERYTHROCYTE [DISTWIDTH] IN BLOOD BY AUTOMATED COUNT: 11.8 % (ref 11–15)
GLOBULIN SER CALC-MCNC: 2.4 G/DL (CALC) (ref 1.9–3.7)
GLUCOSE SERPL-MCNC: 84 MG/DL (ref 65–99)
HCT VFR BLD AUTO: 40.7 % (ref 35–45)
HGB BLD-MCNC: 13.4 G/DL (ref 11.7–15.5)
LYMPHOCYTES # BLD AUTO: 2065 CELLS/UL (ref 850–3900)
LYMPHOCYTES NFR BLD AUTO: 41.3 %
MCH RBC QN AUTO: 29.5 PG (ref 27–33)
MCHC RBC AUTO-ENTMCNC: 32.9 G/DL (ref 32–36)
MCV RBC AUTO: 89.5 FL (ref 80–100)
MONOCYTES # BLD AUTO: 440 CELLS/UL (ref 200–950)
MONOCYTES NFR BLD AUTO: 8.8 %
NEUTROPHILS # BLD AUTO: 2355 CELLS/UL (ref 1500–7800)
NEUTROPHILS NFR BLD AUTO: 47.1 %
PLATELET # BLD AUTO: 228 THOUSAND/UL (ref 140–400)
PMV BLD REES-ECKER: 11.2 FL (ref 7.5–12.5)
POTASSIUM SERPL-SCNC: 4 MMOL/L (ref 3.5–5.3)
PROT SERPL-MCNC: 6.5 G/DL (ref 6.1–8.1)
RBC # BLD AUTO: 4.55 MILLION/UL (ref 3.8–5.1)
SL AMB EGFR AFRICAN AMERICAN: 102 ML/MIN/1.73M2
SL AMB EGFR NON AFRICAN AMERICAN: 88 ML/MIN/1.73M2
SODIUM SERPL-SCNC: 141 MMOL/L (ref 135–146)
WBC # BLD AUTO: 5 THOUSAND/UL (ref 3.8–10.8)

## 2018-07-07 ENCOUNTER — OFFICE VISIT (OUTPATIENT)
Dept: URGENT CARE | Facility: CLINIC | Age: 63
End: 2018-07-07
Payer: COMMERCIAL

## 2018-07-07 VITALS
HEIGHT: 64 IN | HEART RATE: 66 BPM | SYSTOLIC BLOOD PRESSURE: 124 MMHG | DIASTOLIC BLOOD PRESSURE: 76 MMHG | OXYGEN SATURATION: 98 % | BODY MASS INDEX: 26.63 KG/M2 | TEMPERATURE: 98.8 F | WEIGHT: 156 LBS | RESPIRATION RATE: 16 BRPM

## 2018-07-07 DIAGNOSIS — J06.9 UPPER RESPIRATORY TRACT INFECTION, UNSPECIFIED TYPE: Primary | ICD-10-CM

## 2018-07-07 PROCEDURE — G0382 LEV 3 HOSP TYPE B ED VISIT: HCPCS | Performed by: PREVENTIVE MEDICINE

## 2018-07-07 RX ORDER — AZITHROMYCIN 250 MG/1
TABLET, FILM COATED ORAL
Qty: 6 TABLET | Refills: 0 | Status: SHIPPED | OUTPATIENT
Start: 2018-07-07 | End: 2018-07-12

## 2018-07-07 NOTE — PROGRESS NOTES
[facility]        NAME: Walt Hand is a 60 y o  female  : 1955    MRN: [id number]  DATE: 2018  TIME: 10:42 AM    Assessment and Plan   Upper respiratory tract infection, unspecified type [J06 9]  1  Upper respiratory tract infection, unspecified type  azithromycin (ZITHROMAX) 250 mg tablet         Patient Instructions       Follow up with PCP in 3-5 days  Proceed to  ER if symptoms worsen  Chief Complaint     Chief Complaint   Patient presents with   [place] Like Symptoms     Started tuesday with loss of voice, coughing, b/l severe ear pain/feels clogged, dizzy r/t ears, sinus pressure, clear runny nose somtimes stuffy  Denies SOB, fever  History of Present Illness       Seven days of congestion ear fullness mild facial pain and cough  Review of Systems   Review of Systems   HENT: Positive for congestion, ear pain and sinus pressure  Respiratory: Positive for cough            Current Medications       Current Outpatient Prescriptions:     aspirin 81 MG tablet, Take 81 mg by mouth daily, Disp: , Rfl:     atenolol (TENORMIN) 25 mg tablet, Take 1 5 tablets (37 5 mg total) by mouth daily, Disp: 135 tablet, Rfl: 0    CRESTOR 20 MG tablet, take 1 tablet by mouth daily for 5 days then 2 tablets daily for 2 days, Disp: , Rfl: 0    FLUoxetine (PROzac) 10 MG tablet, Take 1 tablet (10 mg total) by mouth daily, Disp: 30 tablet, Rfl: 1    SYNTHROID 75 MCG tablet, Take 1 tablet (75 mcg total) by mouth daily, Disp: 90 tablet, Rfl: 0    Ascorbic Acid (VITAMIN C) 100 MG tablet, Take 500 mg by mouth daily, Disp: , Rfl:     azithromycin (ZITHROMAX) 250 mg tablet, Take 2 tablets today then 1 tablet daily x 4 days, Disp: 6 tablet, Rfl: 0    cholecalciferol (VITAMIN D3) 1,000 units tablet, Take by mouth, Disp: , Rfl:     Coenzyme Q10 100 MG TABS, Take by mouth, Disp: , Rfl:     cyanocobalamin (VITAMIN B-12) 1,000 mcg tablet, Take by mouth, Disp: , Rfl:     Ferrous Sulfate Dried (FEOSOL) 200 (65 Fe) MG TABS, Take by mouth, Disp: , Rfl:     FISH OIL-KRILL OIL PO, by Does not apply route, Disp: , Rfl:     HUMIRA PEN 40 MG/0 8ML PNKT, , Disp: , Rfl:     LORazepam (ATIVAN) 0 5 mg tablet, Take 1 tablet (0 5 mg total) by mouth daily at bedtime, Disp: 30 tablet, Rfl: 5    nitroglycerin (NITROSTAT) 0 4 mg SL tablet, Place 1 tablet under the tongue, Disp: , Rfl:     Omega-3 Fatty Acids (FISH OIL) 500 MG CAPS, Take 500 mg by mouth, Disp: , Rfl:     ondansetron (ZOFRAN-ODT) 4 mg disintegrating tablet, Take 1 tablet by mouth every 6 (six) hours as needed for nausea or vomiting, Disp: 20 tablet, Rfl: 0    ranitidine (ZANTAC) 300 MG tablet, Take 1 tablet (300 mg total) by mouth daily at bedtime, Disp: 30 tablet, Rfl: 5    rifaximin (XIFAXAN) 200 mg tablet, Take by mouth every 8 (eight) hours, Disp: , Rfl:     Current Allergies     Allergies as of 07/07/2018 - Reviewed 07/07/2018   Allergen Reaction Noted    Tetracaine  11/30/2016    Amoxicillin-pot clavulanate  09/25/2017    Benzalkonium chloride Other (See Comments) 04/23/2017            The following portions of the patient's history were reviewed and updated as appropriate: allergies, current medications, past family history, past medical history, past social history, past surgical history and problem list      Past Medical History:   Diagnosis Date    Anxiety     Coronary artery disease     Disease of thyroid gland     Ganglion cyst of wrist, right     last assessed nov 4 2016    Presence of stent in LAD coronary artery     last assessed april 14 2017    Status post de Quervain's release surgery     last assessed dec 16 2016       Past Surgical History:   Procedure Laterality Date    BREAST SURGERY      open biopsy    CORONARY ANGIOPLASTY WITH STENT PLACEMENT      GANGLION CYST EXCISION Right 12/8/2016    Procedure: EXCISION GANGLION CYST right first dorsal extensor compartment;  Surgeon: Edward Amaro MD;  Location:  MAIN OR;  Service:     GA INCIS TENDON SHEATH,RADIAL STYLOID Right 12/8/2016    Procedure: Osmar Boyd;  Surgeon: Yeimy Martin MD;  Location:  MAIN OR;  Service: Orthopedics    TONSILLECTOMY         Family History   Problem Relation Age of Onset    Heart disease Father     Hyperlipidemia Sister     Thyroid disease Sister     Stroke Brother          Medications have been verified  Objective   /76   Pulse 66   Temp 98 8 °F (37 1 °C)   Resp 16   Ht 5' 4" (1 626 m)   Wt 70 8 kg (156 lb)   SpO2 98%   BMI 26 78 kg/m²        Physical Exam     Physical Exam   HENT:   Right Ear: External ear normal    Mouth/Throat: Oropharynx is clear and moist    Left tympanic membrane is mildly swollen gray absent light reflex  Cardiovascular: Normal heart sounds  Pulmonary/Chest: Breath sounds normal    Lymphadenopathy:     She has no cervical adenopathy

## 2018-07-12 DIAGNOSIS — F41.9 ANXIETY: ICD-10-CM

## 2018-07-12 RX ORDER — FLUOXETINE 10 MG/1
TABLET, FILM COATED ORAL
Qty: 30 TABLET | Refills: 1 | Status: SHIPPED | OUTPATIENT
Start: 2018-07-12 | End: 2018-07-18 | Stop reason: SDUPTHER

## 2018-07-12 RX ORDER — FLUOXETINE 10 MG/1
10 TABLET, FILM COATED ORAL DAILY
Qty: 30 TABLET | Refills: 0 | Status: SHIPPED | OUTPATIENT
Start: 2018-07-12 | End: 2018-07-18 | Stop reason: SDUPTHER

## 2018-07-14 ENCOUNTER — HOSPITAL ENCOUNTER (EMERGENCY)
Facility: HOSPITAL | Age: 63
Discharge: HOME | End: 2018-07-14
Attending: EMERGENCY MEDICINE | Admitting: EMERGENCY MEDICINE
Payer: COMMERCIAL

## 2018-07-14 VITALS
TEMPERATURE: 98.3 F | HEART RATE: 68 BPM | RESPIRATION RATE: 18 BRPM | BODY MASS INDEX: 26.43 KG/M2 | OXYGEN SATURATION: 98 % | DIASTOLIC BLOOD PRESSURE: 68 MMHG | WEIGHT: 154 LBS | SYSTOLIC BLOOD PRESSURE: 146 MMHG

## 2018-07-14 DIAGNOSIS — H92.09 EAR PAIN: Primary | ICD-10-CM

## 2018-07-14 DIAGNOSIS — H93.8X9 EAR FULLNESS: ICD-10-CM

## 2018-07-14 PROCEDURE — 99283 EMERGENCY DEPT VISIT LOW MDM: CPT

## 2018-07-14 RX ORDER — PREDNISONE 20 MG/1
60 TABLET ORAL ONCE
Status: COMPLETED | OUTPATIENT
Start: 2018-07-14 | End: 2018-07-14

## 2018-07-14 RX ORDER — PREDNISONE 20 MG/1
40 TABLET ORAL DAILY
Qty: 8 TABLET | Refills: 0 | Status: SHIPPED | OUTPATIENT
Start: 2018-07-14 | End: 2018-07-18

## 2018-07-14 RX ADMIN — PREDNISONE 60 MG: 20 TABLET ORAL at 09:21

## 2018-07-14 NOTE — ED ATTENDING ATTESTATION
Elisha Tom MD, saw and evaluated the patient  I have discussed the patient with the resident/non-physician practitioner and agree with the resident's/non-physician practitioner's findings, Plan of Care, and MDM as documented in the resident's/non-physician practitioner's note, except where noted  All available labs and Radiology studies were reviewed  At this point I agree with the current assessment done in the Emergency Department  I have conducted an independent evaluation of this patient a history and physical is as follows: The patient reports that approximately 2 weeks ago she had an upper respiratory tract infection with a dry cough and nasal congestion that has largely resolved but since that time she has had trouble popping her ears  The patient reports a fullness in both ears and slight decreased hearing in both ears  There is mild discomfort in both ears  The patient denies fever  Physical exam demonstrates a pleasant alert nontoxic female in no acute distress  HEENT exam was normal   Both TMs appear normal  Lungs are clear with equal breath sounds  The heart had a regular rate rhythm  Skin had no rash  Impression:  Eustachian tube dysfunction      Critical Care Time  CritCare Time    Procedures

## 2018-07-14 NOTE — ED PROVIDER NOTES
History  Chief Complaint   Patient presents with    URI     Pt has c/o sore throat and b/l ear pain for 2 weeks     59-year-old woman presents for evaluation of bilateral ear pain  Patient reports a 2 week history of progressive bilateral ear fullness, pain, and intermittent hearing loss/tenderness  Patient was evaluated 1 week ago at Urgent Care for a one-week history of dry cough, ear pain, sore throat, and hoarse voice  She was given a Z-Galileo at that time which she finished  Patient has tried using a nasal steroid, Sudafed, antihistamines, and Afrin without improvement  No previous history of similar symptoms  No fevers, chills, neck pain/stiffness, or mastoid pain  No drainage from the year  On arrival, patient is afebrile with otherwise normal vital signs  Physical exam shows bilateral tympanic membranes are dull without obvious effusion, changes in the external canal, or mastoid pain/erythema  Likely eustachian tube dysfunction in etiology  Will treat symptomatically with steroids and have patient follow up with ENT  Prior to Admission Medications   Prescriptions Last Dose Informant Patient Reported? Taking?    Ascorbic Acid (VITAMIN C) 100 MG tablet   Yes Yes   Sig: Take 500 mg by mouth daily   CRESTOR 20 MG tablet   Yes Yes   Sig: take 1 tablet by mouth daily for 5 days then 2 tablets daily for 2 days   Coenzyme Q10 100 MG TABS   Yes Yes   Sig: Take by mouth   FISH OIL-KRILL OIL PO   Yes Yes   Sig: by Does not apply route   FLUoxetine (PROzac) 10 MG tablet   No Yes   Sig: take 1 tablet by mouth once daily   FLUoxetine (PROzac) 10 MG tablet   No Yes   Sig: Take 1 tablet (10 mg total) by mouth daily   Ferrous Sulfate Dried (FEOSOL) 200 (65 Fe) MG TABS   Yes Yes   Sig: Take by mouth   HUMIRA PEN 40 MG/0 8ML PNKT   Yes Yes   LORazepam (ATIVAN) 0 5 mg tablet   No Yes   Sig: Take 1 tablet (0 5 mg total) by mouth daily at bedtime   Omega-3 Fatty Acids (FISH OIL) 500 MG CAPS   Yes Yes   Sig: Take 500 mg by mouth   SYNTHROID 75 MCG tablet   No Yes   Sig: Take 1 tablet (75 mcg total) by mouth daily   aspirin 81 MG tablet   Yes Yes   Sig: Take 81 mg by mouth daily   atenolol (TENORMIN) 25 mg tablet   No Yes   Sig: Take 1 5 tablets (37 5 mg total) by mouth daily   cholecalciferol (VITAMIN D3) 1,000 units tablet   Yes Yes   Sig: Take by mouth   cyanocobalamin (VITAMIN B-12) 1,000 mcg tablet   Yes Yes   Sig: Take by mouth   nitroglycerin (NITROSTAT) 0 4 mg SL tablet   Yes Yes   Sig: Place 1 tablet under the tongue   ondansetron (ZOFRAN-ODT) 4 mg disintegrating tablet   No Yes   Sig: Take 1 tablet by mouth every 6 (six) hours as needed for nausea or vomiting   ranitidine (ZANTAC) 300 MG tablet   No Yes   Sig: Take 1 tablet (300 mg total) by mouth daily at bedtime   rifaximin (XIFAXAN) 200 mg tablet   Yes Yes   Sig: Take by mouth every 8 (eight) hours      Facility-Administered Medications: None       Past Medical History:   Diagnosis Date    Anxiety     Coronary artery disease     Disease of thyroid gland     Ganglion cyst of wrist, right     last assessed nov 4 2016    Presence of stent in LAD coronary artery     last assessed april 14 2017    Status post de Quervain's release surgery     last assessed dec 16 2016       Past Surgical History:   Procedure Laterality Date    BREAST SURGERY      open biopsy    CORONARY ANGIOPLASTY WITH STENT PLACEMENT      GANGLION CYST EXCISION Right 12/8/2016    Procedure: EXCISION GANGLION CYST right first dorsal extensor compartment;  Surgeon: Froylan Angeles MD;  Location: [facility];  Service:    Taras STEPHENSON TENDON SHEATH,RADIAL STYLOID Right 12/8/2016    Procedure: Nigel Landaverde;  Surgeon: Froylan Angeles MD;  Location: [facility];  Service: Orthopedics    TONSILLECTOMY         Family History   Problem Relation Age of Onset    Heart disease Father     Hyperlipidemia Sister     Thyroid disease Sister     Stroke Brother      I have reviewed and agree with the history as documented  Social History   Substance Use Topics    Smoking status: Never Smoker    Smokeless tobacco: Never Used    Alcohol use No        Review of Systems   Constitutional: Negative for chills and fever  HENT: Positive for congestion, ear pain, hearing loss and tinnitus  Negative for ear discharge, rhinorrhea and sore throat  Eyes: Negative for photophobia and visual disturbance  Respiratory: Negative for cough and shortness of breath  Cardiovascular: Negative for chest pain and leg swelling  Gastrointestinal: Negative for abdominal pain, diarrhea, nausea and vomiting  Genitourinary: Negative for dysuria, frequency and hematuria  Musculoskeletal: Negative for back pain and neck pain  Skin: Negative for rash and wound  Neurological: Negative for light-headedness and headaches  Physical Exam  ED Triage Vitals [07/14/18 0826]   Temperature Pulse Respirations Blood Pressure SpO2   98 3 °F (36 8 °C) 68 18 146/68 98 %      Temp Source Heart Rate Source Patient Position - Orthostatic VS BP Location FiO2 (%)   Tympanic Monitor Sitting Right arm --      Pain Score       7           Orthostatic Vital Signs  Vitals:    07/14/18 0826   BP: 146/68   Pulse: 68   Patient Position - Orthostatic VS: Sitting       Physical Exam   Constitutional: She is oriented to person, place, and time  She appears well-developed and well-nourished  No distress  HENT:   Head: Normocephalic and atraumatic  Bilateral tympanic membranes are dull without obvious effusion, no changes in the external canal, or mastoid pain/erythema   Eyes: Conjunctivae are normal  Pupils are equal, round, and reactive to light  No scleral icterus  Neck: Neck supple  No JVD present  No signs of meningismus   Cardiovascular: Normal rate, regular rhythm and normal heart sounds  Exam reveals no gallop and no friction rub  No murmur heard    Pulmonary/Chest: Effort normal and breath sounds normal  No respiratory distress  She has no wheezes  She has no rales  Abdominal: Soft  She exhibits no distension  There is no tenderness  There is no rebound and no guarding  Musculoskeletal: She exhibits no edema or tenderness  Neurological: She is alert and oriented to person, place, and time  Skin: Skin is warm and dry  She is not diaphoretic  Psychiatric: She has a normal mood and affect  Her behavior is normal  Thought content normal    Vitals reviewed  ED Medications  Medications   predniSONE tablet 60 mg (60 mg Oral Given 7/14/18 4082)       Diagnostic Studies  Results Reviewed     None                 No orders to display         Procedures  Procedures      Phone Consults  ED Phone Contact    ED Course                               MDM  Number of Diagnoses or Management Options  Ear fullness:   Ear pain:   Diagnosis management comments: Likely station tube dysfunction in etiology  No signs of systemic infection, mastoiditis, or meningismus  Patient was given the 1st dose of prednisone and discharged with a burst   She was given instructions for outpatient follow-up and return precautions for worsening symptoms  CritCare Time    Disposition  Final diagnoses:   Ear pain   Ear fullness     Time reflects when diagnosis was documented in both MDM as applicable and the Disposition within this note     Time User Action Codes Description Comment    7/14/2018  9:12 AM Maria Victoria Rao [H69 [id number]] Eustachian tube dysfunction     7/14/2018  9:12 AM Licha Rao [H69 [id number]] Eustachian tube dysfunction     7/14/2018  9:12 AM Arturo Rao [H92 [id number]] Ear pain     7/14/2018  9:12 AM Maria Victoria Rao [[id number]] Ear fullness       ED Disposition     ED Disposition Condition Comment    Discharge  Theora Broom discharge to home/self care      Condition at discharge: Good        Follow-up Information     Follow up With Specialties Details Why Contact Info Additional Information Bree Yusuf MD Family Medicine Schedule an appointment as soon as possible for a visit  [facility] [address] [facility]  Schedule an appointment as soon as possible for a visit  [address] [facility] [address] [facility] Emergency Department Emergency Medicine  As needed [address] [facility] [address] [facility], [address], [address]          Discharge Medication List as of 7/14/2018  9:30 AM      START taking these medications    Details   predniSONE 20 mg tablet Take 2 tablets (40 mg total) by mouth daily for 4 days, Starting Sat 7/14/2018, Until Wed 7/18/2018, Print         CONTINUE these medications which have NOT CHANGED    Details   Ascorbic Acid (VITAMIN C) 100 MG tablet Take 500 mg by mouth daily, Historical Med      aspirin 81 MG tablet Take 81 mg by mouth daily, Until Discontinued, Historical Med      atenolol (TENORMIN) 25 mg tablet Take 1 5 tablets (37 5 mg total) by mouth daily, Starting Tue 3/20/2018, Normal      cholecalciferol (VITAMIN D3) 1,000 units tablet Take by mouth, Historical Med      Coenzyme Q10 100 MG TABS Take by mouth, Historical Med      CRESTOR 20 MG tablet take 1 tablet by mouth daily for 5 days then 2 tablets daily for 2 days, Historical Med      cyanocobalamin (VITAMIN B-12) 1,000 mcg tablet Take by mouth, Historical Med      Ferrous Sulfate Dried (FEOSOL) 200 (65 Fe) MG TABS Take by mouth, Historical Med      FISH OIL-KRILL OIL PO by Does not apply route, Historical Med      !! FLUoxetine (PROzac) 10 MG tablet take 1 tablet by mouth once daily, Normal      !!  FLUoxetine (PROzac) 10 MG tablet Take 1 tablet (10 mg total) by mouth daily, Starting Thu 7/12/2018, Normal      HUMIRA PEN 40 MG/0 8ML PNKT Starting Thu 4/5/2018, Historical Med      LORazepam (ATIVAN) 0 5 mg tablet Take 1 tablet (0 5 mg total) by mouth daily at bedtime, Starting Thu 3/15/2018, Phone In      nitroglycerin (NITROSTAT) 0 4 mg SL tablet Place 1 tablet under the tongue, Historical Med      Omega-3 Fatty Acids (FISH OIL) 500 MG CAPS Take 500 mg by mouth, Starting Thu 3/29/2018, Historical Med      ondansetron (ZOFRAN-ODT) 4 mg disintegrating tablet Take 1 tablet by mouth every 6 (six) hours as needed for nausea or vomiting, Starting Thu 9/28/2017, Print      ranitidine (ZANTAC) 300 MG tablet Take 1 tablet (300 mg total) by mouth daily at bedtime, Starting Thu 3/15/2018, Normal      rifaximin (XIFAXAN) 200 mg tablet Take by mouth every 8 (eight) hours, Starting Fri 4/28/2017, Historical Med      SYNTHROID 75 MCG tablet Take 1 tablet (75 mcg total) by mouth daily, Starting Fri 5/4/2018, Normal       !! - Potential duplicate medications found  Please discuss with provider  No discharge procedures on file  ED Provider  Attending physically available and evaluated Jitendra Alonzo I managed the patient along with the ED Attending      Electronically Signed by         Brenda Whitley MD  07/14/18 9626

## 2018-07-14 NOTE — DISCHARGE INSTRUCTIONS
Please take the prednisone I prescribed you, 2 pills once per day, for 4 days  Follow up with your primary doctor and our ENT doctors for further management  Return to the ER for new or worrisome symptoms  Eustachian Tube Dysfunction   WHAT YOU NEED TO KNOW:   What is eustachian tube dysfunction? Eustachian tube dysfunction (ETD) is a condition that prevents your eustachian tubes from opening properly  It can also cause them to become blocked  Eustachian tubes connect your middle ear to the back of your nose and throat  These tubes open and allow air to flow in and out when you sneeze, swallow, or yawn  What causes or increases my risk of ETD? ETD may be caused by swelling or buildup of mucus in your eustachian tubes  Allergies, a cold, or sinus infection can increase your risk for ETD  Smoking also increases your risk for ETD  What are the signs and symptoms of ETD? · Fullness or pressure in your ears    · Muffled hearing    · Pain in one or both ears    · Ringing in your ears    · Popping or clicking feeling in your ears    · Trouble keeping your balance  How is ETD diagnosed? Your healthcare provider will ask about your symptoms  He will examine your ears, your nose, and the back of your throat  He may also do a hearing test    How is ETD treated? Your ETD may get better on its own without any treatment  You may need any of the following:  · Exercises  such as swallowing, yawning, or chewing gum may help to open your eustachian tubes  Your healthcare provider may also recommend that you take a deep breath and then blow with your mouth shut and your nostrils pinched closed  · Air pressure devices  push air into your nose and eustachian tubes to help relieve air pressure in your ear  · Treatment for allergies  such as decongestants, antihistamines, and nasal steroids may improve ETD  They may help decrease swelling of the eustachian tubes  · Ear tubes  may help to keep your middle ear open  During this procedure, your healthcare provider will cut a small hole in your eardrum  · A myringotomy  is procedure to make a small cut in your eardrum and suction out fluid from your middle ear  · Tuboplasty  is a procedure to widen your eustachian tubes  When should I contact my healthcare provider? · Your symptoms do not improve or get worse  · You have a fever  · You have any hearing loss  · You have questions or concerns about your condition or care  CARE AGREEMENT:   You have the right to help plan your care  Learn about your health condition and how it may be treated  Discuss treatment options with your caregivers to decide what care you want to receive  You always have the right to refuse treatment  The above information is an  only  It is not intended as medical advice for individual conditions or treatments  Talk to your doctor, nurse or pharmacist before following any medical regimen to see if it is safe and effective for you  © 2017 [employer]  Information is for End User's use only and may not be sold, redistributed or otherwise used for commercial purposes  All illustrations and images included in CareNotes® are the copyrighted property of [employer]  or [employer]  Prednisone (By mouth)   Prednisone (PRED-ni-sone)  Treats many diseases and conditions, especially problems related to inflammation  This medicine is a corticosteroid  Brand Name(s): Contrast Allergy PreMed Pack, Yesy, predniSONE Intensol   There may be other brand names for this medicine  When This Medicine Should Not Be Used: This medicine is not right for everyone  Do not use if you had an allergic reaction to prednisone or if you are pregnant  How to Use This Medicine:   Liquid, Tablet, Delayed Release Tablet  · Take your medicine as directed  Your dose may need to be changed several times to find what works best for you    · It is best to take this medicine with food or milk  · Swallow the delayed-release tablet whole  Do not crush, break, or chew it  · Measure the oral liquid medicine with a marked measuring spoon, oral syringe, or medicine cup  · Missed dose: Take a dose as soon as you remember  If it is almost time for your next dose, wait until then and take a regular dose  Do not take extra medicine to make up for a missed dose  · Store the medicine in a closed container at room temperature, away from heat, moisture, and direct light  Do not freeze the oral liquid  Drugs and Foods to Avoid:   Ask your doctor or pharmacist before using any other medicine, including over-the-counter medicines, vitamins, and herbal products  · Tell your doctor if you use any of the following:  ¨ Aminoglutethimide, amphotericin B, carbamazepine, cholestyramine, cyclosporine, digoxin, isoniazid, ketoconazole, phenobarbital, phenytoin, or rifampin  ¨ Blood thinner, such as warfarin  ¨ NSAID pain or arthritis medicine, such as aspirin, diclofenac, ibuprofen, naproxen, celecoxib  ¨ Diuretic (water pill)  ¨ Diabetes medicine  ¨ Macrolide antibiotic, such as azithromycin, clarithromycin, erythromycin  ¨ Estrogen, including birth control pills or hormone replacement therapy  · This medicine may interfere with vaccines  Ask your doctor before you get a flu shot or any other vaccines  Warnings While Using This Medicine:   · It is not safe to take this medicine during pregnancy  It could harm an unborn baby  Tell your doctor right away if you become pregnant  · Tell your doctor if you are breastfeeding or if you have kidney problems, heart failure, high blood pressure, a recent heart attack, diabetes, glaucoma, osteoporosis, or thyroid problems  Tell your doctor about any infection you have  Also tell your doctor if you have had mental or emotional problems (such as depression) or stomach or bowel problems (such as an ulcer or diverticulitis)    · This medicine may cause the following problems:  ¨ Mood or behavior changes  ¨ Higher blood pressure, retaining water, changes in salt or potassium levels in your body  ¨ Cataracts or glaucoma (with long-term use)  ¨ Weak bones or osteoporosis (with long-term use)  ¨ Slow growth in children (with long-term use)  ¨ Muscle problems (with high doses, especially if you have myasthenia gravis or similar nerve and muscle problems)  · Do not stop using this medicine suddenly  Your doctor will need to slowly decrease your dose before you stop it completely  · This medicine could cause you to get infections more easily  Tell your doctor right away if you are exposed to chicken pox, measles, or other serious infection  Tell your doctor if you had a serious infection in the past, such as tuberculosis or herpes  · Tell your doctor about any extra stress or anxiety in your life  Your dose might need to be changed for a short time  · Tell any doctor or dentist who treats you that you are using this medicine  This medicine may affect certain medical test results  · Keep all medicine out of the reach of children  Never share your medicine with anyone    Possible Side Effects While Using This Medicine:   Call your doctor right away if you notice any of these side effects:  · Allergic reaction: Itching or hives, swelling in your face or hands, swelling or tingling in your mouth or throat, chest tightness, trouble breathing  · Dark freckles, skin color changes, coldness, weakness, tiredness, nausea, vomiting, weight loss  · Depression, unusual thoughts, feelings, or behaviors, trouble sleeping  · Fever, chills, cough, sore throat, and body aches  · Muscle pain or weakness  · Rapid weight gain, swelling in your hands, ankles, or feet  · Severe stomach pain, nausea, vomiting, or red or black stools  · Skin changes or growths  · Trouble seeing, eye pain, headache  If you notice these less serious side effects, talk with your doctor:   · Increased appetite  · Round, puffy face  · Weight gain around your neck, upper back, breast, face, or waist  If you notice other side effects that you think are caused by this medicine, tell your doctor  Call your doctor for medical advice about side effects  You may report side effects to FDA at 0-215-FDA-4218  © 2017 [address] [employer] Information is for End User's use only and may not be sold, redistributed or otherwise used for commercial purposes  The above information is an  only  It is not intended as medical advice for individual conditions or treatments  Talk to your doctor, nurse or pharmacist before following any medical regimen to see if it is safe and effective for you

## 2018-07-18 ENCOUNTER — OFFICE VISIT (OUTPATIENT)
Dept: FAMILY MEDICINE CLINIC | Facility: CLINIC | Age: 63
End: 2018-07-18
Payer: COMMERCIAL

## 2018-07-18 VITALS — DIASTOLIC BLOOD PRESSURE: 80 MMHG | SYSTOLIC BLOOD PRESSURE: 138 MMHG

## 2018-07-18 DIAGNOSIS — H65.01 RIGHT ACUTE SEROUS OTITIS MEDIA, RECURRENCE NOT SPECIFIED: ICD-10-CM

## 2018-07-18 DIAGNOSIS — F41.9 ANXIETY: ICD-10-CM

## 2018-07-18 DIAGNOSIS — E03.9 HYPOTHYROIDISM, UNSPECIFIED TYPE: ICD-10-CM

## 2018-07-18 DIAGNOSIS — F33.1 MODERATE EPISODE OF RECURRENT MAJOR DEPRESSIVE DISORDER (HCC): Primary | ICD-10-CM

## 2018-07-18 PROCEDURE — 99214 OFFICE O/P EST MOD 30 MIN: CPT | Performed by: FAMILY MEDICINE

## 2018-07-18 RX ORDER — FLUOXETINE 20 MG/1
20 TABLET, FILM COATED ORAL DAILY
Qty: 30 TABLET | Refills: 5 | Status: SHIPPED | OUTPATIENT
Start: 2018-07-18 | End: 2018-08-03 | Stop reason: ALTCHOICE

## 2018-07-18 NOTE — PROGRESS NOTES
Assessment/Plan: Moderate episode of recurrent major depressive disorder ([address] )  She is significantly improved on fluoxetine 10 mg  We are going to increase her dose to 20 mg daily  Will see her back in 6 months  She is asked to call sooner should her condition warrant  She is not suicidal     Right acute serous otitis media  I told the patient based on her examination her diagnosis is acute serous otitis media with eustachian tube dysfunction  She has only been using the fluticasone for 2 weeks  I instructed her that she is going to have to continue to use this as well as nonsedating antihistamine  May take some time for her serous otitis to clear  She is going to be seeing otolaryngology to [facility] and will ask him to forward us their consultation/recommendation  Diagnoses and all orders for this visit:    Moderate episode of recurrent major depressive disorder (HCC)    Hypothyroidism, unspecified type  -     TSH, 3rd generation with T4 reflex    Anxiety  -     FLUoxetine (PROzac) 20 MG tablet; Take 1 tablet (20 mg total) by mouth daily    Right acute serous otitis media, recurrence not specified          Subjective:   Chief Complaint   Patient presents with    Follow-up     Anxiety meds  157  I have dizziness due to my ears and I have an appointment with ENT tomorrow  She would like her ear checked  My anxiety is better and I sweat more than I should be  Seems to be improving  Sleep is disrupted  Thoughts  Improved overall, Interest improving, energy improved, concentration no change  Anxiety over vision loss, Appetite less, No suicidal ideation  Takes Ativan nightly  Patient ID: Madyson Browning is a 60 y o  female  HPI  Patient is a 79-year-old female who presents today for follow-up of anxiety and depression  She has an additional complaint of persistent fullness in her right ear with discomfort and diminished hearing  She has been seen in urgent care  Her symptoms have not resolved    She will be seeing otolaryngology tomorrow  She has used steroid orally as well as intranasal steroid spray for 2 weeks she has had antibiotics as well as antihistamines and used decongestant nasal sprays without relief  In regards to her anxiety depression she states that the fluoxetine has been very effective  She has improved interest, energy as well as diminished anxiety overall  She does continue have anxiety over her worsening vision  She has no change in her concentration  She has no suicidal ideation  She does state that her appetite is somewhat less though she feels this is good thing  She takes Ativan nightly to sleep  The following portions of the patient's history were reviewed and updated as appropriate: allergies, current medications, past family history, past medical history, past social history, past surgical history and problem list     Review of Systems   Constitution: Positive for decreased appetite  Vasomotor sweats though not specifically night sweats  Declines weight check today  She is feeling dizzy and feels that getting on the scale would be an issue for her  HENT: Positive for congestion, ear pain and hearing loss  Negative for ear discharge and tinnitus  Cardiovascular: Negative  Respiratory: Negative  Neurological: Negative  Psychiatric/Behavioral: Positive for depression  Negative for altered mental status and suicidal ideas  The patient has insomnia and is nervous/anxious  Objective:    Physical Exam   Constitutional: She is oriented to person, place, and time  She appears well-developed and well-nourished  No distress  HENT:   Mouth/Throat: Oropharynx is clear and moist    Right TM reveals serous otitis media with obvious bubbles/air-fluid level  Nasal mucosa is boggy and blue   Eyes: Conjunctivae are normal  No scleral icterus  Neck: Neck supple  No thyromegaly present  Cardiovascular: Normal rate, regular rhythm and normal heart sounds  Pulmonary/Chest: Effort normal and breath sounds normal  No respiratory distress  She has no wheezes  She has no rales  Musculoskeletal: She exhibits no edema or tenderness  Lymphadenopathy:     She has no cervical adenopathy  Neurological: She is alert and oriented to person, place, and time     Psychiatric:   Dysphoric affect

## 2018-07-18 NOTE — ASSESSMENT & PLAN NOTE
She is significantly improved on fluoxetine 10 mg  We are going to increase her dose to 20 mg daily  Will see her back in 6 months  She is asked to call sooner should her condition warrant    She is not suicidal

## 2018-07-18 NOTE — ASSESSMENT & PLAN NOTE
I told the patient based on her examination her diagnosis is acute serous otitis media with eustachian tube dysfunction  She has only been using the fluticasone for 2 weeks  I instructed her that she is going to have to continue to use this as well as nonsedating antihistamine  May take some time for her serous otitis to clear  She is going to be seeing otolaryngology to [facility] and will ask him to forward us their consultation/recommendation

## 2018-07-19 LAB — TSH SERPL-ACNC: 3.25 MIU/L (ref 0.4–4.5)

## 2018-07-30 DIAGNOSIS — E78.2 MIXED HYPERLIPIDEMIA: Primary | ICD-10-CM

## 2018-07-30 RX ORDER — ROSUVASTATIN CALCIUM 20 MG/1
TABLET, FILM COATED ORAL
Qty: 108 TABLET | Refills: 1 | Status: SHIPPED | OUTPATIENT
Start: 2018-07-30 | End: 2019-04-16 | Stop reason: SDUPTHER

## 2018-08-01 PROBLEM — F32.A ANXIETY AND DEPRESSION: Status: ACTIVE | Noted: 2018-04-02

## 2018-08-01 PROBLEM — F41.9 ANXIETY AND DEPRESSION: Status: ACTIVE | Noted: 2018-04-02

## 2018-08-03 ENCOUNTER — OFFICE VISIT (OUTPATIENT)
Dept: OBGYN CLINIC | Facility: CLINIC | Age: 63
End: 2018-08-03
Payer: COMMERCIAL

## 2018-08-03 VITALS
SYSTOLIC BLOOD PRESSURE: 110 MMHG | HEIGHT: 64 IN | WEIGHT: 166 LBS | DIASTOLIC BLOOD PRESSURE: 72 MMHG | BODY MASS INDEX: 28.34 KG/M2

## 2018-08-03 DIAGNOSIS — Z01.419 WELL FEMALE EXAM WITH ROUTINE GYNECOLOGICAL EXAM: ICD-10-CM

## 2018-08-03 DIAGNOSIS — Z12.4 ENCOUNTER FOR SCREENING FOR MALIGNANT NEOPLASM OF CERVIX: Primary | ICD-10-CM

## 2018-08-03 DIAGNOSIS — Z12.31 ENCOUNTER FOR SCREENING MAMMOGRAM FOR MALIGNANT NEOPLASM OF BREAST: ICD-10-CM

## 2018-08-03 DIAGNOSIS — N95.2 POSTMENOPAUSAL ATROPHIC VAGINITIS: ICD-10-CM

## 2018-08-03 PROCEDURE — G0145 SCR C/V CYTO,THINLAYER,RESCR: HCPCS | Performed by: OBSTETRICS & GYNECOLOGY

## 2018-08-03 PROCEDURE — 87624 HPV HI-RISK TYP POOLED RSLT: CPT | Performed by: OBSTETRICS & GYNECOLOGY

## 2018-08-03 PROCEDURE — 99386 PREV VISIT NEW AGE 40-64: CPT | Performed by: OBSTETRICS & GYNECOLOGY

## 2018-08-03 NOTE — PROGRESS NOTES
ASSESSMENT & PLAN: Perry Padilla is a 60 y o  [id number] with normal gynecologic exam     1   Routine well woman exam done today  2   Pap and HPV:Pap with HPV was done today  Current ASCCP Guidelines reviewed  3   Mammogram ordered  Recommend yearly mammography  4   Colonoscopy up to date  5  The patient is not sexually active  6  The following were reviewed in today's visit: breast self exam, use and side effects of HRT, menopause, exercise and healthy diet  7  Patient to return to office in 12 months for annual exam   8  Postmenopausal atrophic vaginitis - patient declines vaginal estrogen therapy  Discussed coconut oil prn  All questions have been answered to her satisfaction  CC:  Annual Gynecologic Examination    HPI: Perry Padilla is a 60 y o  [id number] who presents for annual gynecologic examination  She has the following concerns:  none    Health Maintenance:    She exercises 3 days per week  She wears her seatbelt routinely  She does not perform regular monthly self breast exams  She feels safe at home  Patients does try to follow a balanced diet  Last mammogram: 6/2018  Last colonoscopy: 4 years ago, due in 6 years         Past Medical History:   Diagnosis Date    Anxiety     Coronary artery disease     Disease of thyroid gland     Ganglion cyst of wrist, right     last assessed nov 4 2016    History of Lyme disease     Presence of stent in LAD coronary artery     last assessed april 14 2017    Status post de Quervain's release surgery     last assessed dec 16 2016       Past Surgical History:   Procedure Laterality Date    BREAST BIOPSY Left     CORONARY ANGIOPLASTY WITH STENT PLACEMENT      GANGLION CYST EXCISION Right 12/8/2016    Procedure: EXCISION GANGLION CYST right first dorsal extensor compartment;  Surgeon: Darian Coe MD;  Location: [facility];  Service:    Jeannine Morton [facility] [address] [facility] Right 12/8/2016    Procedure: Angle Alvarez;  Surgeon: Sabas Mi MD;  Location:  MAIN OR;  Service: Orthopedics    TONSILLECTOMY         Past OB/Gyn History:  Period Cycle (Days):  (n/a post menopausal )No LMP recorded (approximate)  Patient is postmenopausal      Menstrual History:  OB History      Para Term  AB Living    4 4 4   0 4    SAB TAB Ectopic Multiple Live Births            4           No LMP recorded (approximate)  Patient is postmenopausal   Period Cycle (Days):  (n/a post menopausal )    Menstrual history: Patient is post menopausal    Patient is not currently sexually active, due to pain with intercourse: heterosexual  Birth control: postmenopausal  Last Pap  2015:  no abnormalities per patient    Family History   Problem Relation Age of Onset    Heart disease Father     Hyperlipidemia Sister     Thyroid disease Sister     Stroke Brother     Heart failure Maternal Grandmother     No Known Problems Maternal Grandfather     No Known Problems Paternal Grandmother     No Known Problems Paternal Grandfather        Social History:  Social History     Social History    Marital status: /Civil Union     Spouse name: N/A    Number of children: N/A    Years of education: N/A     Occupational History    Not on file  Social History Main Topics    Smoking status: Never Smoker    Smokeless tobacco: Never Used    Alcohol use No    Drug use: No    Sexual activity: Not Currently     Birth control/ protection: Post-menopausal     Other Topics Concern    Not on file     Social History Narrative    No narrative on file     Presently lives with   Patient is   Patient is currently watches her grandchild      Allergies   Allergen Reactions    Benzalkonium Chloride Other (See Comments)     Eye redness and pain       Current Outpatient Prescriptions:     Ascorbic Acid (VITAMIN C) 100 MG tablet, Take 500 mg by mouth daily, Disp: , Rfl:     aspirin 81 MG tablet, Take 81 mg by mouth daily, Disp: , Rfl:    atenolol (TENORMIN) 25 mg tablet, Take 1 5 tablets (37 5 mg total) by mouth daily, Disp: 135 tablet, Rfl: 0    cholecalciferol (VITAMIN D3) 1,000 units tablet, Take by mouth, Disp: , Rfl:     Coenzyme Q10 100 MG TABS, Take by mouth, Disp: , Rfl:     CRESTOR 20 MG tablet, Take 1 tablet for 5 days, then 2 tablets for 2 days  , Disp: 108 tablet, Rfl: 1    cyanocobalamin (VITAMIN B-12) 1,000 mcg tablet, Take by mouth, Disp: , Rfl:     LORazepam (ATIVAN) 0 5 mg tablet, Take 1 tablet (0 5 mg total) by mouth daily at bedtime, Disp: 30 tablet, Rfl: 5    nitroglycerin (NITROSTAT) 0 4 mg SL tablet, Place 1 tablet under the tongue, Disp: , Rfl:     ranitidine (ZANTAC) 300 MG tablet, Take 1 tablet (300 mg total) by mouth daily at bedtime, Disp: 30 tablet, Rfl: 5    SYNTHROID 75 MCG tablet, Take 1 tablet (75 mcg total) by mouth daily, Disp: 90 tablet, Rfl: 0    Review of Systems:  A complete review of systems was performed and was negative, except as listed  Denies weight changes, excessive fatigue, breast lumps or changes, urinary incontinence, urinary frequency, constipation or bowel changes, blood in stool, severe headaches, vaginal bleeding, vaginal discharge  + vaginal dryness  Physical Exam:  /72   Ht 5' 3 5" (1 613 m)   Wt 75 3 kg (166 lb)   LMP  (Approximate) Comment: 2009  Breastfeeding? No   BMI 28 94 kg/m²    GEN: The patient was alert and oriented x3, pleasant well-appearing female in no acute distress  HEENT:  Unremarkable, no anterior or posterior lymphadenopathy, no thyromegaly  CV:  RRR, no murmurs  RESP:  Clear to auscultation bilaterally  BREAST:  Symmetric breasts with no palpable breast masses or obvious breast lesions  She has no retractions or nipple discharge  She has no axillary abnormalities or palpable masses  ABD:  Soft, nontender, non-distended    EXT: nontender, no edema  PELVIC:  Normal appearing external female genitalia,moderately atrophic vaginal epithelium, No discharge  Cervix present, atrophic  Bimanual: absent CMT,  normal uterus, non-tender  No palpable adnexal masses  Pap was collected

## 2018-08-07 LAB
HPV RRNA GENITAL QL NAA+PROBE: NORMAL
LAB AP GYN PRIMARY INTERPRETATION: NORMAL
Lab: NORMAL

## 2018-08-22 DIAGNOSIS — E03.9 HYPOTHYROIDISM, UNSPECIFIED TYPE: ICD-10-CM

## 2018-08-29 DIAGNOSIS — I25.10 ARTERIOSCLEROSIS OF CORONARY ARTERY: ICD-10-CM

## 2018-08-29 RX ORDER — ATENOLOL 25 MG/1
TABLET ORAL
Qty: 135 TABLET | Refills: 0 | Status: SHIPPED | OUTPATIENT
Start: 2018-08-29 | End: 2018-11-27 | Stop reason: SDUPTHER

## 2018-09-19 DIAGNOSIS — F41.9 ANXIETY: ICD-10-CM

## 2018-09-20 RX ORDER — LORAZEPAM 0.5 MG/1
TABLET ORAL
Qty: 30 TABLET | Refills: 3 | OUTPATIENT
Start: 2018-09-20

## 2018-10-13 ENCOUNTER — APPOINTMENT (OUTPATIENT)
Dept: LAB | Facility: CLINIC | Age: 63
End: 2018-10-13
Payer: COMMERCIAL

## 2018-10-13 ENCOUNTER — TRANSCRIBE ORDERS (OUTPATIENT)
Dept: LAB | Facility: CLINIC | Age: 63
End: 2018-10-13

## 2018-10-13 ENCOUNTER — LAB (OUTPATIENT)
Dept: LAB | Facility: CLINIC | Age: 63
End: 2018-10-13
Payer: COMMERCIAL

## 2018-10-13 DIAGNOSIS — A69.20 LYME DISEASE: Primary | ICD-10-CM

## 2018-10-13 DIAGNOSIS — M25.50 PAIN IN JOINT, MULTIPLE SITES: ICD-10-CM

## 2018-10-13 DIAGNOSIS — A69.20 LYME DISEASE: ICD-10-CM

## 2018-10-13 DIAGNOSIS — G61.9 INFLAMMATORY NEUROPATHY (HCC): ICD-10-CM

## 2018-10-13 LAB
25(OH)D3 SERPL-MCNC: 23 NG/ML (ref 30–100)
ALBUMIN SERPL BCP-MCNC: 3.5 G/DL (ref 3.5–5)
ALP SERPL-CCNC: 74 U/L (ref 46–116)
ALT SERPL W P-5'-P-CCNC: 32 U/L (ref 12–78)
ANION GAP SERPL CALCULATED.3IONS-SCNC: 9 MMOL/L (ref 4–13)
AST SERPL W P-5'-P-CCNC: 23 U/L (ref 5–45)
ATRIAL RATE: 61 BPM
BASOPHILS # BLD AUTO: 0.04 THOUSANDS/ΜL (ref 0–0.1)
BASOPHILS NFR BLD AUTO: 1 % (ref 0–1)
BILIRUB SERPL-MCNC: 0.7 MG/DL (ref 0.2–1)
BUN SERPL-MCNC: 14 MG/DL (ref 5–25)
CALCIUM SERPL-MCNC: 9.1 MG/DL (ref 8.3–10.1)
CHLORIDE SERPL-SCNC: 106 MMOL/L (ref 100–108)
CO2 SERPL-SCNC: 27 MMOL/L (ref 21–32)
CREAT SERPL-MCNC: 0.68 MG/DL (ref 0.6–1.3)
CRP SERPL HS-MCNC: 2.01 MG/L
EOSINOPHIL # BLD AUTO: 0.12 THOUSAND/ΜL (ref 0–0.61)
EOSINOPHIL NFR BLD AUTO: 2 % (ref 0–6)
ERYTHROCYTE [DISTWIDTH] IN BLOOD BY AUTOMATED COUNT: 13.2 % (ref 11.6–15.1)
FERRITIN SERPL-MCNC: 137 NG/ML (ref 8–388)
GFR SERPL CREATININE-BSD FRML MDRD: 93 ML/MIN/1.73SQ M
GLUCOSE P FAST SERPL-MCNC: 88 MG/DL (ref 65–99)
HCT VFR BLD AUTO: 39.2 % (ref 34.8–46.1)
HGB BLD-MCNC: 12.7 G/DL (ref 11.5–15.4)
IMM GRANULOCYTES # BLD AUTO: 0.01 THOUSAND/UL (ref 0–0.2)
IMM GRANULOCYTES NFR BLD AUTO: 0 % (ref 0–2)
LYMPHOCYTES # BLD AUTO: 2.02 THOUSANDS/ΜL (ref 0.6–4.47)
LYMPHOCYTES NFR BLD AUTO: 39 % (ref 14–44)
MCH RBC QN AUTO: 28.6 PG (ref 26.8–34.3)
MCHC RBC AUTO-ENTMCNC: 32.4 G/DL (ref 31.4–37.4)
MCV RBC AUTO: 88 FL (ref 82–98)
MONOCYTES # BLD AUTO: 0.39 THOUSAND/ΜL (ref 0.17–1.22)
MONOCYTES NFR BLD AUTO: 8 % (ref 4–12)
NEUTROPHILS # BLD AUTO: 2.63 THOUSANDS/ΜL (ref 1.85–7.62)
NEUTS SEG NFR BLD AUTO: 50 % (ref 43–75)
NRBC BLD AUTO-RTO: 0 /100 WBCS
P AXIS: 64 DEGREES
PLATELET # BLD AUTO: 228 THOUSANDS/UL (ref 149–390)
PMV BLD AUTO: 10.2 FL (ref 8.9–12.7)
POTASSIUM SERPL-SCNC: 3.8 MMOL/L (ref 3.5–5.3)
PR INTERVAL: 182 MS
PROT SERPL-MCNC: 7.1 G/DL (ref 6.4–8.2)
QRS AXIS: 13 DEGREES
QRSD INTERVAL: 74 MS
QT INTERVAL: 442 MS
QTC INTERVAL: 444 MS
RBC # BLD AUTO: 4.44 MILLION/UL (ref 3.81–5.12)
SODIUM SERPL-SCNC: 142 MMOL/L (ref 136–145)
T WAVE AXIS: 41 DEGREES
T3FREE SERPL-MCNC: 2.34 PG/ML (ref 2.3–4.2)
T4 FREE SERPL-MCNC: 1.03 NG/DL (ref 0.76–1.46)
TSH SERPL DL<=0.05 MIU/L-ACNC: 1.52 UIU/ML (ref 0.36–3.74)
VENTRICULAR RATE: 61 BPM
VIT B12 SERPL-MCNC: 1112 PG/ML (ref 100–900)
WBC # BLD AUTO: 5.21 THOUSAND/UL (ref 4.31–10.16)

## 2018-10-13 PROCEDURE — 86800 THYROGLOBULIN ANTIBODY: CPT

## 2018-10-13 PROCEDURE — 86038 ANTINUCLEAR ANTIBODIES: CPT

## 2018-10-13 PROCEDURE — 86790 VIRUS ANTIBODY NOS: CPT

## 2018-10-13 PROCEDURE — 83088 ASSAY OF HISTAMINE: CPT

## 2018-10-13 PROCEDURE — 84439 ASSAY OF FREE THYROXINE: CPT

## 2018-10-13 PROCEDURE — 86628 CANDIDA ANTIBODY: CPT

## 2018-10-13 PROCEDURE — 86777 TOXOPLASMA ANTIBODY: CPT

## 2018-10-13 PROCEDURE — 86622 BRUCELLA ANTIBODY: CPT

## 2018-10-13 PROCEDURE — 86664 EPSTEIN-BARR NUCLEAR ANTIGEN: CPT

## 2018-10-13 PROCEDURE — 86753 PROTOZOA ANTIBODY NOS: CPT

## 2018-10-13 PROCEDURE — 86638 Q FEVER ANTIBODY: CPT

## 2018-10-13 PROCEDURE — 36415 COLL VENOUS BLD VENIPUNCTURE: CPT

## 2018-10-13 PROCEDURE — 86003 ALLG SPEC IGE CRUDE XTRC EA: CPT

## 2018-10-13 PROCEDURE — 86738 MYCOPLASMA ANTIBODY: CPT

## 2018-10-13 PROCEDURE — 87799 DETECT AGENT NOS DNA QUANT: CPT

## 2018-10-13 PROCEDURE — 86141 C-REACTIVE PROTEIN HS: CPT

## 2018-10-13 PROCEDURE — 82978 ASSAY OF GLUTATHIONE: CPT

## 2018-10-13 PROCEDURE — 82627 DEHYDROEPIANDROSTERONE: CPT

## 2018-10-13 PROCEDURE — 86357 NK CELLS TOTAL COUNT: CPT

## 2018-10-13 PROCEDURE — 86778 TOXOPLASMA ANTIBODY IGM: CPT

## 2018-10-13 PROCEDURE — 86376 MICROSOMAL ANTIBODY EACH: CPT

## 2018-10-13 PROCEDURE — 86663 EPSTEIN-BARR ANTIBODY: CPT

## 2018-10-13 PROCEDURE — 86631 CHLAMYDIA ANTIBODY: CPT

## 2018-10-13 PROCEDURE — 86757 RICKETTSIA ANTIBODY: CPT

## 2018-10-13 PROCEDURE — 83520 IMMUNOASSAY QUANT NOS NONAB: CPT

## 2018-10-13 PROCEDURE — 86356 MONONUCLEAR CELL ANTIGEN: CPT

## 2018-10-13 PROCEDURE — 93010 ELECTROCARDIOGRAM REPORT: CPT | Performed by: INTERNAL MEDICINE

## 2018-10-13 PROCEDURE — 84443 ASSAY THYROID STIM HORMONE: CPT

## 2018-10-13 PROCEDURE — 82728 ASSAY OF FERRITIN: CPT

## 2018-10-13 PROCEDURE — 84425 ASSAY OF VITAMIN B-1: CPT

## 2018-10-13 PROCEDURE — 83735 ASSAY OF MAGNESIUM: CPT

## 2018-10-13 PROCEDURE — 82306 VITAMIN D 25 HYDROXY: CPT

## 2018-10-13 PROCEDURE — 86632 CHLAMYDIA IGM ANTIBODY: CPT

## 2018-10-13 PROCEDURE — 93005 ELECTROCARDIOGRAM TRACING: CPT

## 2018-10-13 PROCEDURE — 84481 FREE ASSAY (FT-3): CPT

## 2018-10-13 PROCEDURE — 86665 EPSTEIN-BARR CAPSID VCA: CPT

## 2018-10-13 PROCEDURE — 81291 MTHFR GENE: CPT

## 2018-10-13 PROCEDURE — 86611 BARTONELLA ANTIBODY: CPT

## 2018-10-13 PROCEDURE — 86008 ALLG SPEC IGE RECOMB EA: CPT

## 2018-10-13 PROCEDURE — 82542 COL CHROMOTOGRAPHY QUAL/QUAN: CPT

## 2018-10-13 PROCEDURE — 86666 EHRLICHIA ANTIBODY: CPT

## 2018-10-13 PROCEDURE — 84630 ASSAY OF ZINC: CPT

## 2018-10-13 PROCEDURE — 85025 COMPLETE CBC W/AUTO DIFF WBC: CPT

## 2018-10-13 PROCEDURE — 82525 ASSAY OF COPPER: CPT

## 2018-10-13 PROCEDURE — 80053 COMPREHEN METABOLIC PANEL: CPT

## 2018-10-13 PROCEDURE — 82607 VITAMIN B-12: CPT

## 2018-10-14 LAB
DHEA-S SERPL-MCNC: 2.6 UG/DL (ref 29.4–220.5)
THYROPEROXIDASE AB SERPL-ACNC: 13 IU/ML (ref 0–34)

## 2018-10-15 LAB
C BURNET PH1 IGG SER-ACNC: NEGATIVE
C BURNET PH2 IGG SER-ACNC: NEGATIVE
C PNEUM IGA TITR SER IF: NORMAL {TITER}
C PNEUM IGA+IGG+IGM SER-IMP: NORMAL
C PNEUM IGG TITR SER IF: NORMAL {TITER}
C PNEUM IGM TITR SER IF: NORMAL {TITER}
CLINICAL COMMENT: NORMAL
EBV EA IGG SER-ACNC: 100 U/ML (ref 0–8.9)
EBV NA IGG SER IA-ACNC: 282 U/ML (ref 0–17.9)
EBV PATRN SPEC IB-IMP: ABNORMAL
EBV VCA IGG SER IA-ACNC: >600 U/ML (ref 0–17.9)
EBV VCA IGM SER IA-ACNC: <36 U/ML (ref 0–35.9)
M PNEUMO IGG SER IA-ACNC: <100 U/ML (ref 0–99)
M PNEUMO IGM SER IA-ACNC: <770 U/ML (ref 0–769)
MISCELLANEOUS LAB TEST RESULT: NORMAL
T GONDII IGG SERPL IA-ACNC: <3 IU/ML (ref 0–7.1)
T GONDII IGM SER IA-ACNC: <3 AU/ML (ref 0–7.9)

## 2018-10-16 LAB
A PHAGOCYTOPH IGG TITR SER IF: NEGATIVE {TITER}
A PHAGOCYTOPH IGM TITR SER IF: NEGATIVE {TITER}
B HENSELAE IGG TITR SER IF: NEGATIVE TITER
B HENSELAE IGM TITR SER IF: NEGATIVE TITER
B MICROTI IGG TITR SER: NORMAL {TITER}
B MICROTI IGM TITR SER: NORMAL {TITER}
B QUINTANA IGG TITR SER IF: NEGATIVE TITER
B QUINTANA IGM TITR SER IF: NEGATIVE TITER
BRUCELLA IGG SER QL IA: NEGATIVE
E CHAFFEENSIS IGG TITR SER IF: NEGATIVE {TITER}
E CHAFFEENSIS IGM TITR SER IF: NEGATIVE {TITER}
HISTAMINE BLD-MCNC: 58 NG/ML (ref 12–127)
MAGNESIUM RBC-MCNC: 5.9 MG/DL (ref 4.2–6.8)
R RICKETTSI IGM SER QL IA: 0.32 INDEX (ref 0–0.89)
THYROGLOB AB SERPL-ACNC: <1 IU/ML (ref 0–0.9)

## 2018-10-17 LAB
BEEF IGE QN: <0.1 KU/L
CANDIDA AB IGA: <10 U/ML (ref 0–9)
CANDIDA AB IGG: 38 U/ML (ref 0–29)
CANDIDA AB IGM: <10 U/ML (ref 0–9)
CARN ESTERS/C0 SERPL-SRTO: 0.4 RATIO (ref 0–0.9)
CARNITINE FREE SERPL-SCNC: 22 UMOL/L (ref 20–55)
CARNITINE SERPL-SCNC: 30 UMOL/L (ref 27–73)
R RICKETTSI IGG SER QL IA: NEGATIVE
STONE ANALYSIS-IMP: NORMAL
VIT B1 BLD-SCNC: 123.4 NMOL/L (ref 66.5–200)

## 2018-10-18 LAB — UBIQUINONE10 SERPL-MCNC: 0.44 UG/ML (ref 0.37–2.2)

## 2018-10-22 LAB
MISCELLANEOUS LAB TEST RESULT: NORMAL
MTHFR GENE MUT ANL BLD/T: NORMAL

## 2018-11-05 LAB
MISCELLANEOUS LAB TEST RESULT: NORMAL
RMSF IGG IFA: NORMAL

## 2018-11-27 DIAGNOSIS — I25.10 ARTERIOSCLEROSIS OF CORONARY ARTERY: ICD-10-CM

## 2018-11-27 RX ORDER — ATENOLOL 25 MG/1
TABLET ORAL
Qty: 135 TABLET | Refills: 0 | Status: SHIPPED | OUTPATIENT
Start: 2018-11-27 | End: 2019-01-14 | Stop reason: SDUPTHER

## 2018-12-20 RX ORDER — LEVOTHYROXINE SODIUM 75 MCG
TABLET ORAL
Qty: 90 TABLET | Refills: 1 | OUTPATIENT
Start: 2018-12-20

## 2019-01-14 DIAGNOSIS — E78.5 HYPERLIPIDEMIA, UNSPECIFIED HYPERLIPIDEMIA TYPE: Primary | ICD-10-CM

## 2019-01-14 DIAGNOSIS — Z11.59 NEED FOR HEPATITIS C SCREENING TEST: ICD-10-CM

## 2019-01-14 DIAGNOSIS — I25.10 ARTERIOSCLEROSIS OF CORONARY ARTERY: ICD-10-CM

## 2019-01-14 DIAGNOSIS — Z13.0 SCREENING FOR DEFICIENCY ANEMIA: ICD-10-CM

## 2019-01-14 RX ORDER — ATENOLOL 25 MG/1
TABLET ORAL
Qty: 135 TABLET | Refills: 0 | Status: SHIPPED | OUTPATIENT
Start: 2019-01-14 | End: 2019-04-30 | Stop reason: SDUPTHER

## 2019-01-17 DIAGNOSIS — E03.9 HYPOTHYROIDISM, UNSPECIFIED TYPE: Primary | ICD-10-CM

## 2019-01-28 DIAGNOSIS — K21.9 GERD WITHOUT ESOPHAGITIS: ICD-10-CM

## 2019-01-28 RX ORDER — RANITIDINE 300 MG/1
TABLET ORAL
Qty: 30 TABLET | Refills: 5 | Status: SHIPPED | OUTPATIENT
Start: 2019-01-28 | End: 2019-07-19 | Stop reason: SDUPTHER

## 2019-03-02 ENCOUNTER — TRANSCRIBE ORDERS (OUTPATIENT)
Dept: LAB | Facility: CLINIC | Age: 64
End: 2019-03-02

## 2019-03-02 ENCOUNTER — APPOINTMENT (OUTPATIENT)
Dept: LAB | Facility: CLINIC | Age: 64
End: 2019-03-02
Payer: COMMERCIAL

## 2019-03-02 DIAGNOSIS — Z51.81 ENCOUNTER FOR THERAPEUTIC DRUG MONITORING: Primary | ICD-10-CM

## 2019-03-02 DIAGNOSIS — Z51.81 ENCOUNTER FOR THERAPEUTIC DRUG MONITORING: ICD-10-CM

## 2019-03-02 LAB
ALBUMIN SERPL BCP-MCNC: 3.4 G/DL (ref 3.5–5)
ALP SERPL-CCNC: 73 U/L (ref 46–116)
ALT SERPL W P-5'-P-CCNC: 24 U/L (ref 12–78)
ANION GAP SERPL CALCULATED.3IONS-SCNC: 8 MMOL/L (ref 4–13)
AST SERPL W P-5'-P-CCNC: 17 U/L (ref 5–45)
BASOPHILS # BLD AUTO: 0.05 THOUSANDS/ΜL (ref 0–0.1)
BASOPHILS NFR BLD AUTO: 1 % (ref 0–1)
BILIRUB SERPL-MCNC: 0.5 MG/DL (ref 0.2–1)
BUN SERPL-MCNC: 18 MG/DL (ref 5–25)
CALCIUM SERPL-MCNC: 9.3 MG/DL (ref 8.3–10.1)
CHLORIDE SERPL-SCNC: 107 MMOL/L (ref 100–108)
CO2 SERPL-SCNC: 28 MMOL/L (ref 21–32)
CREAT SERPL-MCNC: 0.73 MG/DL (ref 0.6–1.3)
EOSINOPHIL # BLD AUTO: 0.19 THOUSAND/ΜL (ref 0–0.61)
EOSINOPHIL NFR BLD AUTO: 3 % (ref 0–6)
ERYTHROCYTE [DISTWIDTH] IN BLOOD BY AUTOMATED COUNT: 13.5 % (ref 11.6–15.1)
GFR SERPL CREATININE-BSD FRML MDRD: 87 ML/MIN/1.73SQ M
GLUCOSE P FAST SERPL-MCNC: 95 MG/DL (ref 65–99)
HCT VFR BLD AUTO: 41 % (ref 34.8–46.1)
HGB BLD-MCNC: 12.8 G/DL (ref 11.5–15.4)
IMM GRANULOCYTES # BLD AUTO: 0.02 THOUSAND/UL (ref 0–0.2)
IMM GRANULOCYTES NFR BLD AUTO: 0 % (ref 0–2)
LYMPHOCYTES # BLD AUTO: 2.27 THOUSANDS/ΜL (ref 0.6–4.47)
LYMPHOCYTES NFR BLD AUTO: 36 % (ref 14–44)
MCH RBC QN AUTO: 27.5 PG (ref 26.8–34.3)
MCHC RBC AUTO-ENTMCNC: 31.2 G/DL (ref 31.4–37.4)
MCV RBC AUTO: 88 FL (ref 82–98)
MONOCYTES # BLD AUTO: 0.52 THOUSAND/ΜL (ref 0.17–1.22)
MONOCYTES NFR BLD AUTO: 8 % (ref 4–12)
NEUTROPHILS # BLD AUTO: 3.31 THOUSANDS/ΜL (ref 1.85–7.62)
NEUTS SEG NFR BLD AUTO: 52 % (ref 43–75)
NRBC BLD AUTO-RTO: 0 /100 WBCS
PLATELET # BLD AUTO: 251 THOUSANDS/UL (ref 149–390)
PMV BLD AUTO: 10 FL (ref 8.9–12.7)
POTASSIUM SERPL-SCNC: 4.3 MMOL/L (ref 3.5–5.3)
PROT SERPL-MCNC: 6.9 G/DL (ref 6.4–8.2)
RBC # BLD AUTO: 4.66 MILLION/UL (ref 3.81–5.12)
SODIUM SERPL-SCNC: 143 MMOL/L (ref 136–145)
WBC # BLD AUTO: 6.36 THOUSAND/UL (ref 4.31–10.16)

## 2019-03-02 PROCEDURE — 80053 COMPREHEN METABOLIC PANEL: CPT

## 2019-03-02 PROCEDURE — 85025 COMPLETE CBC W/AUTO DIFF WBC: CPT

## 2019-03-02 PROCEDURE — 36415 COLL VENOUS BLD VENIPUNCTURE: CPT

## 2019-04-13 ENCOUNTER — TRANSCRIBE ORDERS (OUTPATIENT)
Dept: LAB | Facility: CLINIC | Age: 64
End: 2019-04-13

## 2019-04-16 DIAGNOSIS — E03.9 HYPOTHYROIDISM, UNSPECIFIED TYPE: ICD-10-CM

## 2019-04-16 DIAGNOSIS — E78.2 MIXED HYPERLIPIDEMIA: ICD-10-CM

## 2019-04-16 RX ORDER — ROSUVASTATIN CALCIUM 20 MG/1
TABLET, FILM COATED ORAL
Qty: 108 TABLET | Refills: 0 | Status: SHIPPED | OUTPATIENT
Start: 2019-04-16 | End: 2019-05-01 | Stop reason: SDUPTHER

## 2019-04-16 RX ORDER — LEVOTHYROXINE SODIUM 75 MCG
75 TABLET ORAL DAILY
Qty: 90 TABLET | Refills: 0 | Status: SHIPPED | OUTPATIENT
Start: 2019-04-16 | End: 2019-06-12 | Stop reason: SDUPTHER

## 2019-04-29 DIAGNOSIS — I25.10 ARTERIOSCLEROSIS OF CORONARY ARTERY: ICD-10-CM

## 2019-04-29 RX ORDER — ATENOLOL 25 MG/1
37.5 TABLET ORAL DAILY
Qty: 135 TABLET | Refills: 0 | Status: CANCELLED | OUTPATIENT
Start: 2019-04-29

## 2019-04-30 DIAGNOSIS — E78.2 MIXED HYPERLIPIDEMIA: ICD-10-CM

## 2019-04-30 DIAGNOSIS — I25.10 ARTERIOSCLEROSIS OF CORONARY ARTERY: ICD-10-CM

## 2019-04-30 RX ORDER — ROSUVASTATIN CALCIUM 20 MG/1
TABLET, FILM COATED ORAL
Qty: 108 TABLET | Refills: 0 | Status: CANCELLED | OUTPATIENT
Start: 2019-04-30

## 2019-04-30 RX ORDER — ATENOLOL 25 MG/1
37.5 TABLET ORAL DAILY
Qty: 135 TABLET | Refills: 0 | Status: SHIPPED | OUTPATIENT
Start: 2019-04-30 | End: 2019-06-22 | Stop reason: SDUPTHER

## 2019-05-01 DIAGNOSIS — E78.2 MIXED HYPERLIPIDEMIA: ICD-10-CM

## 2019-05-01 RX ORDER — ROSUVASTATIN CALCIUM 20 MG/1
TABLET, FILM COATED ORAL
Qty: 108 TABLET | Refills: 0 | Status: SHIPPED | OUTPATIENT
Start: 2019-05-01 | End: 2019-07-19 | Stop reason: SDUPTHER

## 2019-05-03 DIAGNOSIS — I25.10 ARTERIOSCLEROSIS OF CORONARY ARTERY: ICD-10-CM

## 2019-05-03 RX ORDER — ATENOLOL 25 MG/1
TABLET ORAL
Qty: 135 TABLET | Refills: 0 | OUTPATIENT
Start: 2019-05-03

## 2019-05-20 DIAGNOSIS — I25.10 ARTERIOSCLEROSIS OF CORONARY ARTERY: Primary | ICD-10-CM

## 2019-05-20 RX ORDER — NITROGLYCERIN 0.4 MG/1
0.4 TABLET SUBLINGUAL
Qty: 30 TABLET | Refills: 0 | Status: SHIPPED | OUTPATIENT
Start: 2019-05-20 | End: 2021-08-01 | Stop reason: SDUPTHER

## 2019-06-12 DIAGNOSIS — E03.9 HYPOTHYROIDISM, UNSPECIFIED TYPE: ICD-10-CM

## 2019-06-12 RX ORDER — LEVOTHYROXINE SODIUM 75 MCG
TABLET ORAL
Qty: 90 TABLET | Refills: 0 | Status: SHIPPED | OUTPATIENT
Start: 2019-06-12 | End: 2019-07-19 | Stop reason: SDUPTHER

## 2019-06-22 DIAGNOSIS — I25.10 ARTERIOSCLEROSIS OF CORONARY ARTERY: ICD-10-CM

## 2019-06-24 RX ORDER — ATENOLOL 25 MG/1
TABLET ORAL
Qty: 135 TABLET | Refills: 0 | Status: SHIPPED | OUTPATIENT
Start: 2019-06-24 | End: 2019-07-19 | Stop reason: SDUPTHER

## 2019-07-19 ENCOUNTER — OFFICE VISIT (OUTPATIENT)
Dept: FAMILY MEDICINE CLINIC | Facility: CLINIC | Age: 64
End: 2019-07-19
Payer: COMMERCIAL

## 2019-07-19 VITALS
TEMPERATURE: 98.3 F | SYSTOLIC BLOOD PRESSURE: 130 MMHG | WEIGHT: 169 LBS | HEART RATE: 66 BPM | DIASTOLIC BLOOD PRESSURE: 80 MMHG | BODY MASS INDEX: 28.85 KG/M2 | OXYGEN SATURATION: 98 % | HEIGHT: 64 IN

## 2019-07-19 DIAGNOSIS — E78.2 MIXED HYPERLIPIDEMIA: ICD-10-CM

## 2019-07-19 DIAGNOSIS — Z12.39 BREAST CANCER SCREENING: Primary | ICD-10-CM

## 2019-07-19 DIAGNOSIS — I25.10 ARTERIOSCLEROSIS OF CORONARY ARTERY: ICD-10-CM

## 2019-07-19 DIAGNOSIS — K21.9 GERD WITHOUT ESOPHAGITIS: ICD-10-CM

## 2019-07-19 DIAGNOSIS — F41.9 ANXIETY: ICD-10-CM

## 2019-07-19 DIAGNOSIS — E03.9 HYPOTHYROIDISM, UNSPECIFIED TYPE: ICD-10-CM

## 2019-07-19 PROBLEM — H65.01 RIGHT ACUTE SEROUS OTITIS MEDIA: Status: RESOLVED | Noted: 2018-07-18 | Resolved: 2019-07-19

## 2019-07-19 PROBLEM — Z15.89: Status: ACTIVE | Noted: 2019-03-28

## 2019-07-19 PROCEDURE — 99214 OFFICE O/P EST MOD 30 MIN: CPT | Performed by: FAMILY MEDICINE

## 2019-07-19 PROCEDURE — 3008F BODY MASS INDEX DOCD: CPT | Performed by: FAMILY MEDICINE

## 2019-07-19 RX ORDER — LORAZEPAM 0.5 MG/1
0.5 TABLET ORAL
Qty: 30 TABLET | Refills: 5 | Status: SHIPPED | OUTPATIENT
Start: 2019-07-19 | End: 2020-03-02

## 2019-07-19 RX ORDER — VIT A/VIT C/VIT E/ZINC/COPPER 7160-113
TABLET, DELAYED RELEASE (ENTERIC COATED) ORAL
COMMUNITY
Start: 2019-03-07 | End: 2022-01-14 | Stop reason: ALTCHOICE

## 2019-07-19 RX ORDER — ATENOLOL 25 MG/1
37.5 TABLET ORAL DAILY
Qty: 135 TABLET | Refills: 1 | Status: SHIPPED | OUTPATIENT
Start: 2019-07-19 | End: 2020-01-15

## 2019-07-19 RX ORDER — TIMOLOL MALEATE 5 MG/ML
1 SOLUTION/ DROPS OPHTHALMIC 2 TIMES DAILY
COMMUNITY
Start: 2019-05-23 | End: 2020-04-14 | Stop reason: ALTCHOICE

## 2019-07-19 RX ORDER — RANITIDINE 300 MG/1
300 TABLET ORAL
Qty: 30 TABLET | Refills: 5 | Status: SHIPPED | OUTPATIENT
Start: 2019-07-19 | End: 2020-04-14 | Stop reason: ALTCHOICE

## 2019-07-19 RX ORDER — LEVOTHYROXINE SODIUM 75 MCG
75 TABLET ORAL DAILY
Qty: 90 TABLET | Refills: 1 | Status: SHIPPED | OUTPATIENT
Start: 2019-07-19 | End: 2020-01-06

## 2019-07-19 RX ORDER — ROSUVASTATIN CALCIUM 20 MG/1
TABLET, FILM COATED ORAL
Qty: 30 TABLET | Refills: 5 | Status: SHIPPED | OUTPATIENT
Start: 2019-07-19 | End: 2019-07-22 | Stop reason: SDUPTHER

## 2019-07-19 RX ORDER — AMOXICILLIN 500 MG
CAPSULE ORAL DAILY
COMMUNITY
End: 2020-04-17 | Stop reason: ALTCHOICE

## 2019-07-19 RX ORDER — ADALIMUMAB 40MG/0.4ML
KIT SUBCUTANEOUS
COMMUNITY
Start: 2019-06-29 | End: 2020-04-14 | Stop reason: ALTCHOICE

## 2019-07-19 RX ORDER — ATENOLOL 25 MG/1
37.5 TABLET ORAL DAILY
Qty: 135 TABLET | Refills: 0 | Status: CANCELLED | OUTPATIENT
Start: 2019-07-19

## 2019-07-19 NOTE — PROGRESS NOTES
Assessment/Plan:  GERD without esophagitis  Continue with Zantac which was refilled  No dysphagia or other worrisome symptoms  Hypothyroidism  Continue with Synthroid  TSH and examination confirm euthyroid state  Arteriosclerosis of coronary artery  Continue follow-up with Cardiology  Hyperlipidemia  Continue with Crestor  Anxiety  Continue with use of Ativan p r n  Diagnoses and all orders for this visit:    Breast cancer screening  -     Mammo screening bilateral w 3d & cad; Future    Hypothyroidism, unspecified type  -     SYNTHROID 75 MCG tablet; Take 1 tablet (75 mcg total) by mouth daily    Arteriosclerosis of coronary artery  -     VAS carotid complete study; Future  -     atenolol (TENORMIN) 25 mg tablet; Take 1 5 tablets (37 5 mg total) by mouth daily    Anxiety  -     LORazepam (ATIVAN) 0 5 mg tablet; Take 1 tablet (0 5 mg total) by mouth daily at bedtime    Mixed hyperlipidemia  -     CRESTOR 20 MG tablet; Take 1 tablet for 5 days, then 2 tablets for 2 days  GERD without esophagitis  -     ranitidine (ZANTAC) 300 MG tablet; Take 1 tablet (300 mg total) by mouth daily at bedtime    Other orders  -     Omega-3 Fatty Acids (FISH OIL) 1200 MG CAPS; Take by mouth daily  -     timolol (TIMOPTIC) 0 5 % ophthalmic solution; Apply 1 drop to eye 2 (two) times a day  -     Specialty Vitamins Products (ICAPS LUTEIN & ZEAXANTHIN) TBEC; Lutein 20 mg daily and Zeaxanthin 10 mg daily  Saffron 15 mg daily   N-acetylcysteine 600 mg daily  -     HUMIRA PEN 40 MG/0 4ML PNKT  -     Cancel: atenolol (TENORMIN) 25 mg tablet; Take 1 5 tablets (37 5 mg total) by mouth daily      hemoglobin A1c with next blood work  Subjective:   Chief Complaint   Patient presents with    med check     here for checkup  labs up to date  bmi f/u plan needed     Things going pretty good  I put on weight but now have lost 10 on Optiva  / Medifast  Treadmill and resistance  Patient ID: Warden Driscoll is a 58 y o  female  HPI  The patient is a 76-year-old female with history of coronary atherosclerosis, hypothyroidism, obesity, gastroesophageal reflux disease as well as anxiety and depression who presents today for routine follow-up  She had a blood work performed a head of time  Overall looks great  Her blood sugar was 95  She has some concerns that this is higher than her typical 80s  Her lipid profile looks good  Her TSH was fine  She has no cardiovascular pulmonary symptomatology  Bowels are moving normally  No  symptoms  No headache or focal neurologic symptoms  She started on a Medifast diet  She is going to begin exercise program on Monday which will increased treadmill and weight training  She has lost 10 lb already  The following portions of the patient's history were reviewed and updated as appropriate: allergies, current medications, past medical history, past social history, past surgical history and problem list     Review of Systems   Constitution: Positive for weight loss  10 lb through managed diet   Cardiovascular: Negative  Respiratory: Negative  Endocrine: Negative  Hematologic/Lymphatic: Negative  Gastrointestinal: Negative  Genitourinary: Negative  Neurological: Negative for dizziness and headaches  Objective:    Physical Exam   Constitutional: She is oriented to person, place, and time  She appears well-developed and well-nourished  Overweight in no distress   Neck: Neck supple  No JVD present  No thyromegaly present  Cardiovascular: Normal rate, regular rhythm and normal heart sounds  Pulmonary/Chest: Effort normal and breath sounds normal    Musculoskeletal: She exhibits no edema  Lymphadenopathy:     She has no cervical adenopathy  Neurological: She is alert and oriented to person, place, and time  Psychiatric: Thought content normal    Somewhat dysphoric appearance but improved from previous   Nursing note and vitals reviewed

## 2019-07-22 DIAGNOSIS — E78.2 MIXED HYPERLIPIDEMIA: ICD-10-CM

## 2019-07-22 RX ORDER — ROSUVASTATIN CALCIUM 20 MG/1
TABLET, FILM COATED ORAL
Qty: 38 TABLET | Refills: 5 | Status: SHIPPED | OUTPATIENT
Start: 2019-07-22 | End: 2020-03-16

## 2019-07-23 DIAGNOSIS — E78.2 MIXED HYPERLIPIDEMIA: ICD-10-CM

## 2019-07-23 RX ORDER — ROSUVASTATIN CALCIUM 20 MG/1
TABLET, FILM COATED ORAL
Qty: 38 TABLET | Refills: 0 | Status: CANCELLED | OUTPATIENT
Start: 2019-07-23

## 2019-07-26 DIAGNOSIS — E78.5 HYPERLIPIDEMIA, UNSPECIFIED HYPERLIPIDEMIA TYPE: ICD-10-CM

## 2019-07-26 DIAGNOSIS — E03.9 HYPOTHYROIDISM, UNSPECIFIED TYPE: Primary | ICD-10-CM

## 2019-10-25 DIAGNOSIS — K21.9 GERD WITHOUT ESOPHAGITIS: Primary | ICD-10-CM

## 2019-10-25 RX ORDER — SUCRALFATE 1 G/1
1 TABLET ORAL 4 TIMES DAILY
Qty: 120 TABLET | Refills: 5 | Status: SHIPPED | OUTPATIENT
Start: 2019-10-25 | End: 2020-04-17 | Stop reason: SDUPTHER

## 2019-10-25 NOTE — PROGRESS NOTES
Patient calls stating she can no longer use ranitidine due to recall  Pepcid was not tried at her suggestion  She prefers Carafate which has been helping  Will send a prescription for same

## 2019-11-01 ENCOUNTER — TELEPHONE (OUTPATIENT)
Dept: CARDIOLOGY CLINIC | Facility: CLINIC | Age: 64
End: 2019-11-01

## 2019-11-01 NOTE — TELEPHONE ENCOUNTER
called to see if we received paperwork from they life insurance co   I did not see anything in pt's records  Gave  a different fax # to use  He will have them fax info needed to [phone]

## 2019-12-06 ENCOUNTER — HOSPITAL ENCOUNTER (OUTPATIENT)
Dept: NON INVASIVE DIAGNOSTICS | Facility: CLINIC | Age: 64
Discharge: HOME | End: 2019-12-06
Payer: COMMERCIAL

## 2019-12-06 ENCOUNTER — HOSPITAL ENCOUNTER (OUTPATIENT)
Dept: MAMMOGRAPHY | Facility: HOSPITAL | Age: 64
Discharge: HOME | End: 2019-12-06
Payer: COMMERCIAL

## 2019-12-06 VITALS — BODY MASS INDEX: 28.85 KG/M2 | WEIGHT: 169 LBS | HEIGHT: 64 IN

## 2019-12-06 DIAGNOSIS — I25.10 ARTERIOSCLEROSIS OF CORONARY ARTERY: ICD-10-CM

## 2019-12-06 DIAGNOSIS — Z12.39 BREAST CANCER SCREENING: ICD-10-CM

## 2019-12-06 PROCEDURE — 77067 SCR MAMMO BI INCL CAD: CPT

## 2019-12-06 PROCEDURE — 93880 EXTRACRANIAL BILAT STUDY: CPT

## 2019-12-06 PROCEDURE — 93880 EXTRACRANIAL BILAT STUDY: CPT | Performed by: SURGERY

## 2019-12-06 PROCEDURE — 77063 BREAST TOMOSYNTHESIS BI: CPT

## 2020-01-03 DIAGNOSIS — E03.9 HYPOTHYROIDISM, UNSPECIFIED TYPE: ICD-10-CM

## 2020-01-06 RX ORDER — LEVOTHYROXINE SODIUM 75 MCG
TABLET ORAL
Qty: 90 TABLET | Refills: 0 | Status: SHIPPED | OUTPATIENT
Start: 2020-01-06 | End: 2020-03-29

## 2020-01-14 DIAGNOSIS — I25.10 ARTERIOSCLEROSIS OF CORONARY ARTERY: ICD-10-CM

## 2020-01-15 RX ORDER — ATENOLOL 25 MG/1
TABLET ORAL
Qty: 135 TABLET | Refills: 0 | Status: SHIPPED | OUTPATIENT
Start: 2020-01-15 | End: 2020-03-29

## 2020-02-27 DIAGNOSIS — F41.9 ANXIETY: ICD-10-CM

## 2020-02-28 DIAGNOSIS — I25.10 ARTERIOSCLEROSIS OF CORONARY ARTERY: ICD-10-CM

## 2020-02-28 DIAGNOSIS — E03.9 HYPOTHYROIDISM, UNSPECIFIED TYPE: Primary | ICD-10-CM

## 2020-02-28 DIAGNOSIS — H35.00 RETINOPATHY OF BOTH EYES: ICD-10-CM

## 2020-02-28 DIAGNOSIS — E78.5 HYPERLIPIDEMIA, UNSPECIFIED HYPERLIPIDEMIA TYPE: ICD-10-CM

## 2020-02-29 LAB — HBA1C MFR BLD HPLC: 5.7 %

## 2020-03-01 ENCOUNTER — TELEPHONE (OUTPATIENT)
Dept: OTHER | Facility: HOSPITAL | Age: 65
End: 2020-03-01

## 2020-03-01 ENCOUNTER — APPOINTMENT (OUTPATIENT)
Dept: LAB | Facility: HOSPITAL | Age: 65
End: 2020-03-01

## 2020-03-01 DIAGNOSIS — E87.5 HYPERKALEMIA: ICD-10-CM

## 2020-03-01 DIAGNOSIS — E87.5 HYPERKALEMIA: Primary | ICD-10-CM

## 2020-03-01 LAB — POTASSIUM SERPL-SCNC: 3.9 MMOL/L (ref 3.5–5.3)

## 2020-03-01 PROCEDURE — 84132 ASSAY OF SERUM POTASSIUM: CPT

## 2020-03-01 PROCEDURE — 36415 COLL VENOUS BLD VENIPUNCTURE: CPT

## 2020-03-01 NOTE — TELEPHONE ENCOUNTER
[facility] lab called about lab work 02/29/20 showed elevated potassium of 7 2  Spoke with patient and her  Diego Granados, patient is asymptomatic, advised to go to ER, but she refused due to cost  Will order stat potassium level to be done this am at [place] [address] lab, order entered

## 2020-03-02 RX ORDER — LORAZEPAM 0.5 MG/1
TABLET ORAL
Qty: 30 TABLET | Refills: 0 | Status: SHIPPED | OUTPATIENT
Start: 2020-03-02 | End: 2020-04-17 | Stop reason: SDUPTHER

## 2020-03-14 DIAGNOSIS — E78.2 MIXED HYPERLIPIDEMIA: ICD-10-CM

## 2020-03-16 RX ORDER — ROSUVASTATIN CALCIUM 20 MG/1
TABLET, FILM COATED ORAL
Qty: 38 TABLET | Refills: 5 | Status: SHIPPED | OUTPATIENT
Start: 2020-03-16 | End: 2020-04-06

## 2020-03-29 DIAGNOSIS — I25.10 ARTERIOSCLEROSIS OF CORONARY ARTERY: ICD-10-CM

## 2020-03-29 DIAGNOSIS — E03.9 HYPOTHYROIDISM, UNSPECIFIED TYPE: ICD-10-CM

## 2020-03-29 RX ORDER — ATENOLOL 25 MG/1
TABLET ORAL
Qty: 135 TABLET | Refills: 3 | Status: SHIPPED | OUTPATIENT
Start: 2020-03-29 | End: 2020-04-17 | Stop reason: SDUPTHER

## 2020-03-29 RX ORDER — LEVOTHYROXINE SODIUM 75 MCG
TABLET ORAL
Qty: 90 TABLET | Refills: 3 | Status: SHIPPED | OUTPATIENT
Start: 2020-03-29 | End: 2020-04-17 | Stop reason: SDUPTHER

## 2020-04-06 DIAGNOSIS — E78.2 MIXED HYPERLIPIDEMIA: ICD-10-CM

## 2020-04-06 RX ORDER — ROSUVASTATIN CALCIUM 20 MG/1
TABLET, FILM COATED ORAL
Qty: 38 TABLET | Refills: 5 | Status: SHIPPED | OUTPATIENT
Start: 2020-04-06 | End: 2020-12-11 | Stop reason: SDUPTHER

## 2020-04-17 ENCOUNTER — TELEMEDICINE (OUTPATIENT)
Dept: FAMILY MEDICINE CLINIC | Facility: CLINIC | Age: 65
End: 2020-04-17
Payer: COMMERCIAL

## 2020-04-17 VITALS
HEIGHT: 64 IN | DIASTOLIC BLOOD PRESSURE: 67 MMHG | HEART RATE: 61 BPM | BODY MASS INDEX: 25.44 KG/M2 | SYSTOLIC BLOOD PRESSURE: 108 MMHG | WEIGHT: 149 LBS

## 2020-04-17 DIAGNOSIS — E03.9 HYPOTHYROIDISM, UNSPECIFIED TYPE: ICD-10-CM

## 2020-04-17 DIAGNOSIS — K21.9 GERD WITHOUT ESOPHAGITIS: ICD-10-CM

## 2020-04-17 DIAGNOSIS — E78.5 HYPERLIPIDEMIA, UNSPECIFIED HYPERLIPIDEMIA TYPE: ICD-10-CM

## 2020-04-17 DIAGNOSIS — I25.10 ARTERIOSCLEROSIS OF CORONARY ARTERY: ICD-10-CM

## 2020-04-17 DIAGNOSIS — G47.01 INSOMNIA DUE TO MEDICAL CONDITION: Primary | ICD-10-CM

## 2020-04-17 DIAGNOSIS — F41.9 ANXIETY: ICD-10-CM

## 2020-04-17 PROCEDURE — 99213 OFFICE O/P EST LOW 20 MIN: CPT | Performed by: FAMILY MEDICINE

## 2020-04-17 RX ORDER — LEVOTHYROXINE SODIUM 75 MCG
75 TABLET ORAL DAILY
Qty: 90 TABLET | Refills: 1 | Status: SHIPPED | OUTPATIENT
Start: 2020-04-17 | End: 2020-12-11 | Stop reason: SDUPTHER

## 2020-04-17 RX ORDER — SUCRALFATE 1 G/1
1 TABLET ORAL 4 TIMES DAILY
Qty: 120 TABLET | Refills: 5 | Status: SHIPPED | OUTPATIENT
Start: 2020-04-17 | End: 2020-12-11 | Stop reason: SDUPTHER

## 2020-04-17 RX ORDER — LORAZEPAM 0.5 MG/1
0.5 TABLET ORAL
Qty: 30 TABLET | Refills: 5 | Status: SHIPPED | OUTPATIENT
Start: 2020-04-17 | End: 2020-10-22

## 2020-04-17 RX ORDER — ATENOLOL 25 MG/1
37.5 TABLET ORAL DAILY
Qty: 135 TABLET | Refills: 1 | Status: SHIPPED | OUTPATIENT
Start: 2020-04-17 | End: 2020-12-11 | Stop reason: SDUPTHER

## 2020-09-18 ENCOUNTER — OFFICE VISIT (OUTPATIENT)
Dept: LAB | Facility: CLINIC | Age: 65
End: 2020-09-18
Payer: COMMERCIAL

## 2020-09-18 ENCOUNTER — TRANSCRIBE ORDERS (OUTPATIENT)
Dept: LAB | Facility: CLINIC | Age: 65
End: 2020-09-18

## 2020-09-18 DIAGNOSIS — H25.13 NUCLEAR SCLEROTIC CATARACT OF BOTH EYES: ICD-10-CM

## 2020-09-18 DIAGNOSIS — H25.13 NUCLEAR SCLEROTIC CATARACT OF BOTH EYES: Primary | ICD-10-CM

## 2020-09-18 LAB
ATRIAL RATE: 68 BPM
P AXIS: 68 DEGREES
PR INTERVAL: 172 MS
QRS AXIS: 15 DEGREES
QRSD INTERVAL: 76 MS
QT INTERVAL: 418 MS
QTC INTERVAL: 444 MS
T WAVE AXIS: 50 DEGREES
VENTRICULAR RATE: 68 BPM

## 2020-09-18 PROCEDURE — 93010 ELECTROCARDIOGRAM REPORT: CPT | Performed by: INTERNAL MEDICINE

## 2020-09-18 PROCEDURE — 93005 ELECTROCARDIOGRAM TRACING: CPT

## 2020-09-22 ENCOUNTER — OFFICE VISIT (OUTPATIENT)
Dept: FAMILY MEDICINE CLINIC | Facility: CLINIC | Age: 65
End: 2020-09-22
Payer: COMMERCIAL

## 2020-09-22 VITALS
TEMPERATURE: 98.3 F | SYSTOLIC BLOOD PRESSURE: 138 MMHG | OXYGEN SATURATION: 98 % | DIASTOLIC BLOOD PRESSURE: 86 MMHG | HEART RATE: 66 BPM | HEIGHT: 64 IN | BODY MASS INDEX: 23.39 KG/M2 | WEIGHT: 137 LBS

## 2020-09-22 DIAGNOSIS — H26.9 CATARACT OF BOTH EYES, UNSPECIFIED CATARACT TYPE: Primary | ICD-10-CM

## 2020-09-22 DIAGNOSIS — I25.10 ARTERIOSCLEROSIS OF CORONARY ARTERY: ICD-10-CM

## 2020-09-22 PROCEDURE — 99214 OFFICE O/P EST MOD 30 MIN: CPT | Performed by: FAMILY MEDICINE

## 2020-09-22 RX ORDER — PREDNISOLONE ACETATE 10 MG/ML
SUSPENSION/ DROPS OPHTHALMIC
COMMUNITY
Start: 2020-09-17 | End: 2020-12-07 | Stop reason: ALTCHOICE

## 2020-09-22 RX ORDER — MOXIFLOXACIN 5 MG/ML
SOLUTION/ DROPS OPHTHALMIC
COMMUNITY
Start: 2020-09-17 | End: 2020-12-07 | Stop reason: ALTCHOICE

## 2020-09-22 RX ORDER — LUTEIN 10 MG
40 TABLET ORAL
COMMUNITY

## 2020-09-22 RX ORDER — KETOROLAC TROMETHAMINE 5 MG/ML
SOLUTION OPHTHALMIC
COMMUNITY
Start: 2020-09-17 | End: 2020-12-11 | Stop reason: ALTCHOICE

## 2020-09-22 NOTE — PROGRESS NOTES
Assessment/Plan:  Cataract of both eyes  Patient has symptomatic bilateral cataract   The plan is for extraction of OS on 9/28 and OD on 10/12  We reviewed her preoperative ECG, reviewed her history and current physical examination and she is cleared for the proposed procedure  Arteriosclerosis of coronary artery  Presently asymptomatic  She continues on her beta-blocker and statin  She will hold aspirin prior to surgery at ophthalmologist request     Hypothyroidism  Continue levothyroxine  Appears euthyroid  Diagnoses and all orders for this visit:    Cataract of both eyes, unspecified cataract type    Arteriosclerosis of coronary artery    Other orders  -     ketorolac (ACULAR) 0 5 % ophthalmic solution; STARTING AFTER SURGERY: APPLY ONE DROP TO OPERATIVE EYE 4 TIMES DAILY FOR 4 WEEKS  -     Lutein 10 MG TABS  -     moxifloxacin (VIGAMOX) 0 5 % ophthalmic solution; 1 drop in operative eye 4x/day 3 days prior surgery, continuing 7 days after then STOP  -     prednisoLONE acetate (PRED FORTE) 1 % ophthalmic suspension; Starting after surgery, 1 drop in the operative eye 4 times daily and taper over 4 weeks as instructed  Subjective:   Chief Complaint   Patient presents with    Surgical clearance for cataract of the left eye on 9/28/10    OS 9/28 and OD 10/12  Blurry vision, cant drive nighttime  Stopped ASA, no bleeding diathesis, no history TE phenomenon, no anesthesia issues, T/A, breast biopsy without anesthesia issues  No cardiac fxn  Limitation  No pulmonary sx  No const sx  Labs to SL  Ferritin     Patient ID: Naren Alston is a 71 y o  female  HPI     The patient is a 76-year-old female who presents today for preoperative clearance for cataract extraction OS on 09/28 and OD on 10/12  Indication is blurry vision, visual glare as well as difficulty with nighttime vision  Her only anti coagulant is aspirin which she will stop 5 days prior to surgery    She has had no bleeding such as gingival, bowel or urine  She has no history of thromboembolic phenomenon  She denies DVT or PE specifically  She has had no issues with anesthesia having previously had tonsils and adenoids, breast biopsy, coronary artery stent X cetera  She denies any cardiac functional limitation and can climb 2 flights of stairs or walk unlimited distance on flat surface without chest pain or shortness of breath  She has no other pulmonary symptomatology  She has no constitutional symptoms such as night sweats, weight loss, fever X cetera  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, progressive cone randolph dystrophy  Additionally she has gastroesophageal reflux disease, hypothyroidism, coronary atherosclerosis, anxiety, chronic pain, hyperlipidemia in addition to other  Medications include Synthroid, Carafate, p r n  Nitroglycerin, lorazepam, atenolol in addition to the aspirin  The following portions of the patient's history were reviewed and updated as appropriate: allergies, current medications, past family history, past medical history, past social history, past surgical history and problem list     Review of Systems   Constitution: Negative  Eyes: Positive for blurred vision, visual disturbance and visual halos  Negative for double vision, pain and photophobia  Endocrine: Negative for polydipsia, polyphagia and polyuria  Hematologic/Lymphatic: Negative for adenopathy and bleeding problem  Does not bruise/bleed easily  Skin: Negative  Gastrointestinal: Negative for bowel incontinence, constipation and diarrhea  Genitourinary: Negative for bladder incontinence  Neurological: Negative for dizziness and headaches  Psychiatric/Behavioral: Negative for depression and suicidal ideas  The patient has insomnia and is nervous/anxious  Objective:    Physical Exam   Constitutional: She is oriented to person, place, and time  She appears well-developed and well-nourished  No distress     HENT: Right Ear: External ear normal    Left Ear: External ear normal    Mouth/Throat: Oropharynx is clear and moist  No oropharyngeal exudate  Eyes: Pupils are equal, round, and reactive to light  Conjunctivae are normal  Right eye exhibits no discharge  Left eye exhibits no discharge  No scleral icterus  She does have some lens opacity bilaterally   Neck: Neck supple  No JVD present  No thyromegaly present  Cardiovascular: Normal rate, regular rhythm and normal heart sounds  No carotid bruit   Pulmonary/Chest: Effort normal and breath sounds normal  No respiratory distress  She has no wheezes  She has no rales  Abdominal: Soft  Bowel sounds are normal  She exhibits no mass  There is no abdominal tenderness  Musculoskeletal:         General: No edema  Lymphadenopathy:     She has no cervical adenopathy  Neurological: She is alert and oriented to person, place, and time  Skin: No rash noted  No erythema  Psychiatric: Her behavior is normal  Thought content normal    Chronic dysphoric affect   Nursing note and vitals reviewed

## 2020-09-22 NOTE — ASSESSMENT & PLAN NOTE
Patient has symptomatic bilateral cataract   The plan is for extraction of OS on 9/28 and OD on 10/12  We reviewed her preoperative ECG, reviewed her history and current physical examination and she is cleared for the proposed procedure

## 2020-09-22 NOTE — ASSESSMENT & PLAN NOTE
Presently asymptomatic  She continues on her beta-blocker and statin    She will hold aspirin prior to surgery at ophthalmologist request

## 2020-10-22 DIAGNOSIS — F41.9 ANXIETY: ICD-10-CM

## 2020-10-22 RX ORDER — LORAZEPAM 0.5 MG/1
TABLET ORAL
Qty: 30 TABLET | Refills: 1 | Status: SHIPPED | OUTPATIENT
Start: 2020-10-22 | End: 2020-12-11 | Stop reason: SDUPTHER

## 2020-11-04 DIAGNOSIS — E78.5 HYPERLIPIDEMIA, UNSPECIFIED HYPERLIPIDEMIA TYPE: ICD-10-CM

## 2020-11-04 DIAGNOSIS — R73.01 IMPAIRED FASTING BLOOD SUGAR: ICD-10-CM

## 2020-11-04 DIAGNOSIS — E03.9 HYPOTHYROIDISM, UNSPECIFIED TYPE: Primary | ICD-10-CM

## 2020-11-24 DIAGNOSIS — Z12.31 ENCOUNTER FOR SCREENING MAMMOGRAM FOR MALIGNANT NEOPLASM OF BREAST: Primary | ICD-10-CM

## 2020-12-07 RX ORDER — CYANOCOBALAMIN (VITAMIN B-12) 500 MCG
TABLET ORAL
COMMUNITY

## 2020-12-08 ENCOUNTER — APPOINTMENT (OUTPATIENT)
Dept: LAB | Facility: CLINIC | Age: 65
End: 2020-12-08
Payer: COMMERCIAL

## 2020-12-08 ENCOUNTER — TRANSCRIBE ORDERS (OUTPATIENT)
Dept: LAB | Facility: CLINIC | Age: 65
End: 2020-12-08

## 2020-12-08 DIAGNOSIS — E78.5 HYPERLIPIDEMIA, UNSPECIFIED HYPERLIPIDEMIA TYPE: ICD-10-CM

## 2020-12-08 DIAGNOSIS — E03.9 HYPOTHYROIDISM, ACQUIRED: ICD-10-CM

## 2020-12-08 DIAGNOSIS — R73.01 IMPAIRED FASTING GLUCOSE: ICD-10-CM

## 2020-12-08 DIAGNOSIS — R73.01 IMPAIRED FASTING GLUCOSE: Primary | ICD-10-CM

## 2020-12-08 LAB
ALBUMIN SERPL BCP-MCNC: 3.5 G/DL (ref 3.5–5)
ALP SERPL-CCNC: 75 U/L (ref 46–116)
ALT SERPL W P-5'-P-CCNC: 23 U/L (ref 12–78)
ANION GAP SERPL CALCULATED.3IONS-SCNC: 6 MMOL/L (ref 4–13)
AST SERPL W P-5'-P-CCNC: 13 U/L (ref 5–45)
BILIRUB SERPL-MCNC: 1.18 MG/DL (ref 0.2–1)
BUN SERPL-MCNC: 20 MG/DL (ref 5–25)
CALCIUM SERPL-MCNC: 9.5 MG/DL (ref 8.3–10.1)
CHLORIDE SERPL-SCNC: 107 MMOL/L (ref 100–108)
CHOLEST SERPL-MCNC: 179 MG/DL (ref 50–200)
CO2 SERPL-SCNC: 28 MMOL/L (ref 21–32)
CREAT SERPL-MCNC: 0.67 MG/DL (ref 0.6–1.3)
ERYTHROCYTE [DISTWIDTH] IN BLOOD BY AUTOMATED COUNT: 13.9 % (ref 11.6–15.1)
EST. AVERAGE GLUCOSE BLD GHB EST-MCNC: 105 MG/DL
GFR SERPL CREATININE-BSD FRML MDRD: 92 ML/MIN/1.73SQ M
GLUCOSE P FAST SERPL-MCNC: 88 MG/DL (ref 65–99)
HBA1C MFR BLD: 5.3 %
HCT VFR BLD AUTO: 40.9 % (ref 34.8–46.1)
HDLC SERPL-MCNC: 86 MG/DL
HGB BLD-MCNC: 12.9 G/DL (ref 11.5–15.4)
LDLC SERPL CALC-MCNC: 79 MG/DL (ref 0–100)
MCH RBC QN AUTO: 28.8 PG (ref 26.8–34.3)
MCHC RBC AUTO-ENTMCNC: 31.5 G/DL (ref 31.4–37.4)
MCV RBC AUTO: 91 FL (ref 82–98)
NONHDLC SERPL-MCNC: 93 MG/DL
PLATELET # BLD AUTO: 235 THOUSANDS/UL (ref 149–390)
PMV BLD AUTO: 10 FL (ref 8.9–12.7)
POTASSIUM SERPL-SCNC: 3.9 MMOL/L (ref 3.5–5.3)
PROT SERPL-MCNC: 6.7 G/DL (ref 6.4–8.2)
RBC # BLD AUTO: 4.48 MILLION/UL (ref 3.81–5.12)
SODIUM SERPL-SCNC: 141 MMOL/L (ref 136–145)
TRIGL SERPL-MCNC: 72 MG/DL
TSH SERPL DL<=0.05 MIU/L-ACNC: 3.26 UIU/ML (ref 0.36–3.74)
WBC # BLD AUTO: 5.58 THOUSAND/UL (ref 4.31–10.16)

## 2020-12-08 PROCEDURE — 80053 COMPREHEN METABOLIC PANEL: CPT

## 2020-12-08 PROCEDURE — 83036 HEMOGLOBIN GLYCOSYLATED A1C: CPT | Performed by: FAMILY MEDICINE

## 2020-12-08 PROCEDURE — 85027 COMPLETE CBC AUTOMATED: CPT

## 2020-12-08 PROCEDURE — 36415 COLL VENOUS BLD VENIPUNCTURE: CPT | Performed by: FAMILY MEDICINE

## 2020-12-08 PROCEDURE — 84443 ASSAY THYROID STIM HORMONE: CPT

## 2020-12-08 PROCEDURE — 80061 LIPID PANEL: CPT

## 2020-12-11 ENCOUNTER — TELEMEDICINE (OUTPATIENT)
Dept: FAMILY MEDICINE CLINIC | Facility: CLINIC | Age: 65
End: 2020-12-11
Payer: COMMERCIAL

## 2020-12-11 VITALS
DIASTOLIC BLOOD PRESSURE: 62 MMHG | BODY MASS INDEX: 22.71 KG/M2 | HEART RATE: 60 BPM | SYSTOLIC BLOOD PRESSURE: 100 MMHG | WEIGHT: 133 LBS | HEIGHT: 64 IN

## 2020-12-11 DIAGNOSIS — R17 SERUM TOTAL BILIRUBIN ELEVATED: ICD-10-CM

## 2020-12-11 DIAGNOSIS — F41.9 ANXIETY AND DEPRESSION: ICD-10-CM

## 2020-12-11 DIAGNOSIS — K21.9 GERD WITHOUT ESOPHAGITIS: ICD-10-CM

## 2020-12-11 DIAGNOSIS — I25.10 ARTERIOSCLEROSIS OF CORONARY ARTERY: ICD-10-CM

## 2020-12-11 DIAGNOSIS — E78.2 MIXED HYPERLIPIDEMIA: ICD-10-CM

## 2020-12-11 DIAGNOSIS — F41.9 ANXIETY: ICD-10-CM

## 2020-12-11 DIAGNOSIS — E03.9 HYPOTHYROIDISM, UNSPECIFIED TYPE: Primary | ICD-10-CM

## 2020-12-11 DIAGNOSIS — F32.A ANXIETY AND DEPRESSION: ICD-10-CM

## 2020-12-11 PROCEDURE — 99214 OFFICE O/P EST MOD 30 MIN: CPT | Performed by: FAMILY MEDICINE

## 2020-12-11 RX ORDER — ROSUVASTATIN CALCIUM 20 MG/1
TABLET, FILM COATED ORAL
Qty: 38 TABLET | Refills: 5 | Status: SHIPPED | OUTPATIENT
Start: 2020-12-11 | End: 2021-06-28

## 2020-12-11 RX ORDER — SUCRALFATE 1 G/1
1 TABLET ORAL 4 TIMES DAILY
Qty: 120 TABLET | Refills: 5 | Status: ON HOLD | OUTPATIENT
Start: 2020-12-11 | End: 2021-12-14 | Stop reason: ALTCHOICE

## 2020-12-11 RX ORDER — LEVOTHYROXINE SODIUM 75 MCG
75 TABLET ORAL DAILY
Qty: 90 TABLET | Refills: 1 | Status: SHIPPED | OUTPATIENT
Start: 2020-12-11 | End: 2020-12-22 | Stop reason: SDUPTHER

## 2020-12-11 RX ORDER — LORAZEPAM 0.5 MG/1
0.5 TABLET ORAL
Qty: 30 TABLET | Refills: 1 | Status: SHIPPED | OUTPATIENT
Start: 2020-12-11 | End: 2021-02-15

## 2020-12-11 RX ORDER — ATENOLOL 25 MG/1
37.5 TABLET ORAL DAILY
Qty: 135 TABLET | Refills: 1 | Status: SHIPPED | OUTPATIENT
Start: 2020-12-11 | End: 2020-12-22 | Stop reason: SDUPTHER

## 2020-12-22 DIAGNOSIS — E03.9 HYPOTHYROIDISM, UNSPECIFIED TYPE: ICD-10-CM

## 2020-12-22 DIAGNOSIS — I25.10 ARTERIOSCLEROSIS OF CORONARY ARTERY: ICD-10-CM

## 2020-12-22 RX ORDER — LEVOTHYROXINE SODIUM 75 MCG
75 TABLET ORAL DAILY
Qty: 90 TABLET | Refills: 1 | Status: SHIPPED | OUTPATIENT
Start: 2020-12-22 | End: 2021-07-30 | Stop reason: SDUPTHER

## 2020-12-22 RX ORDER — ATENOLOL 25 MG/1
37.5 TABLET ORAL DAILY
Qty: 135 TABLET | Refills: 1 | Status: SHIPPED | OUTPATIENT
Start: 2020-12-22 | End: 2021-07-30 | Stop reason: SDUPTHER

## 2020-12-24 ENCOUNTER — HOSPITAL ENCOUNTER (OUTPATIENT)
Dept: MAMMOGRAPHY | Facility: CLINIC | Age: 65
Discharge: HOME | End: 2020-12-24
Payer: COMMERCIAL

## 2020-12-24 VITALS — BODY MASS INDEX: 22.71 KG/M2 | WEIGHT: 133 LBS | HEIGHT: 64 IN

## 2020-12-24 DIAGNOSIS — Z12.31 ENCOUNTER FOR SCREENING MAMMOGRAM FOR MALIGNANT NEOPLASM OF BREAST: ICD-10-CM

## 2020-12-24 PROCEDURE — 77063 BREAST TOMOSYNTHESIS BI: CPT

## 2020-12-24 PROCEDURE — 77067 SCR MAMMO BI INCL CAD: CPT

## 2021-01-26 ENCOUNTER — PATIENT MESSAGE (OUTPATIENT)
Dept: FAMILY MEDICINE CLINIC | Facility: CLINIC | Age: 66
End: 2021-01-26

## 2021-02-14 DIAGNOSIS — F41.9 ANXIETY: ICD-10-CM

## 2021-02-15 RX ORDER — LORAZEPAM 0.5 MG/1
TABLET ORAL
Qty: 30 TABLET | Refills: 1 | Status: SHIPPED | OUTPATIENT
Start: 2021-02-15 | End: 2021-06-08

## 2021-03-10 DIAGNOSIS — Z23 ENCOUNTER FOR IMMUNIZATION: ICD-10-CM

## 2021-06-06 DIAGNOSIS — F41.9 ANXIETY: ICD-10-CM

## 2021-06-07 DIAGNOSIS — Z13.0 SCREENING FOR IRON DEFICIENCY ANEMIA: ICD-10-CM

## 2021-06-07 DIAGNOSIS — E03.9 HYPOTHYROIDISM, UNSPECIFIED TYPE: Primary | ICD-10-CM

## 2021-06-07 DIAGNOSIS — R73.01 IMPAIRED FASTING GLUCOSE: ICD-10-CM

## 2021-06-07 DIAGNOSIS — E78.5 HYPERLIPIDEMIA, UNSPECIFIED HYPERLIPIDEMIA TYPE: ICD-10-CM

## 2021-06-08 RX ORDER — LORAZEPAM 0.5 MG/1
TABLET ORAL
Qty: 30 TABLET | Refills: 0 | Status: SHIPPED | OUTPATIENT
Start: 2021-06-08 | End: 2021-07-06

## 2021-06-27 DIAGNOSIS — E78.2 MIXED HYPERLIPIDEMIA: ICD-10-CM

## 2021-06-28 RX ORDER — ROSUVASTATIN CALCIUM 20 MG/1
TABLET, COATED ORAL
Qty: 38 TABLET | Refills: 5 | Status: SHIPPED | OUTPATIENT
Start: 2021-06-28 | End: 2021-07-30 | Stop reason: SDUPTHER

## 2021-07-05 DIAGNOSIS — F41.9 ANXIETY: ICD-10-CM

## 2021-07-06 RX ORDER — LORAZEPAM 0.5 MG/1
TABLET ORAL
Qty: 30 TABLET | Refills: 0 | Status: SHIPPED | OUTPATIENT
Start: 2021-07-06 | End: 2021-07-30 | Stop reason: SDUPTHER

## 2021-07-07 ENCOUNTER — PATIENT MESSAGE (OUTPATIENT)
Dept: FAMILY MEDICINE CLINIC | Facility: CLINIC | Age: 66
End: 2021-07-07

## 2021-07-23 ENCOUNTER — APPOINTMENT (OUTPATIENT)
Dept: LAB | Facility: CLINIC | Age: 66
End: 2021-07-23
Payer: COMMERCIAL

## 2021-07-23 DIAGNOSIS — Z13.0 SCREENING FOR IRON DEFICIENCY ANEMIA: ICD-10-CM

## 2021-07-23 DIAGNOSIS — E78.5 HYPERLIPIDEMIA, UNSPECIFIED HYPERLIPIDEMIA TYPE: ICD-10-CM

## 2021-07-23 DIAGNOSIS — E03.9 HYPOTHYROIDISM, UNSPECIFIED TYPE: ICD-10-CM

## 2021-07-23 DIAGNOSIS — R73.01 IMPAIRED FASTING GLUCOSE: ICD-10-CM

## 2021-07-23 LAB
ALBUMIN SERPL BCP-MCNC: 3.9 G/DL (ref 3.5–5)
ALP SERPL-CCNC: 61 U/L (ref 46–116)
ALT SERPL W P-5'-P-CCNC: 28 U/L (ref 12–78)
ANION GAP SERPL CALCULATED.3IONS-SCNC: 8 MMOL/L (ref 4–13)
AST SERPL W P-5'-P-CCNC: 19 U/L (ref 5–45)
BILIRUB SERPL-MCNC: 1 MG/DL (ref 0.2–1)
BUN SERPL-MCNC: 15 MG/DL (ref 5–25)
CALCIUM SERPL-MCNC: 9.5 MG/DL (ref 8.3–10.1)
CHLORIDE SERPL-SCNC: 105 MMOL/L (ref 100–108)
CHOLEST SERPL-MCNC: 143 MG/DL (ref 50–200)
CO2 SERPL-SCNC: 29 MMOL/L (ref 21–32)
CREAT SERPL-MCNC: 0.74 MG/DL (ref 0.6–1.3)
ERYTHROCYTE [DISTWIDTH] IN BLOOD BY AUTOMATED COUNT: 13.7 % (ref 11.6–15.1)
EST. AVERAGE GLUCOSE BLD GHB EST-MCNC: 108 MG/DL
GFR SERPL CREATININE-BSD FRML MDRD: 85 ML/MIN/1.73SQ M
GLUCOSE P FAST SERPL-MCNC: 90 MG/DL (ref 65–99)
HBA1C MFR BLD: 5.4 %
HCT VFR BLD AUTO: 44.3 % (ref 34.8–46.1)
HDLC SERPL-MCNC: 70 MG/DL
HGB BLD-MCNC: 14.1 G/DL (ref 11.5–15.4)
LDLC SERPL CALC-MCNC: 65 MG/DL (ref 0–100)
MCH RBC QN AUTO: 28.4 PG (ref 26.8–34.3)
MCHC RBC AUTO-ENTMCNC: 31.8 G/DL (ref 31.4–37.4)
MCV RBC AUTO: 89 FL (ref 82–98)
NONHDLC SERPL-MCNC: 73 MG/DL
PLATELET # BLD AUTO: 233 THOUSANDS/UL (ref 149–390)
PMV BLD AUTO: 10.7 FL (ref 8.9–12.7)
POTASSIUM SERPL-SCNC: 3.9 MMOL/L (ref 3.5–5.3)
PROT SERPL-MCNC: 7.4 G/DL (ref 6.4–8.2)
RBC # BLD AUTO: 4.96 MILLION/UL (ref 3.81–5.12)
SODIUM SERPL-SCNC: 142 MMOL/L (ref 136–145)
TRIGL SERPL-MCNC: 39 MG/DL
TSH SERPL DL<=0.05 MIU/L-ACNC: 1.59 UIU/ML (ref 0.36–3.74)
WBC # BLD AUTO: 5.32 THOUSAND/UL (ref 4.31–10.16)

## 2021-07-23 PROCEDURE — 80053 COMPREHEN METABOLIC PANEL: CPT

## 2021-07-23 PROCEDURE — 80061 LIPID PANEL: CPT

## 2021-07-23 PROCEDURE — 84443 ASSAY THYROID STIM HORMONE: CPT

## 2021-07-23 PROCEDURE — 85027 COMPLETE CBC AUTOMATED: CPT

## 2021-07-23 PROCEDURE — 83036 HEMOGLOBIN GLYCOSYLATED A1C: CPT

## 2021-07-23 PROCEDURE — 36415 COLL VENOUS BLD VENIPUNCTURE: CPT

## 2021-07-29 ENCOUNTER — TELEPHONE (OUTPATIENT)
Dept: OBGYN CLINIC | Facility: HOSPITAL | Age: 66
End: 2021-07-29

## 2021-07-29 NOTE — TELEPHONE ENCOUNTER
Called patient to reschedule appointment with Dr Sherri Proctor for 9/3, provider not in office  Left message for patient to call back  patient can be scheduled for next available or with Margo Gray if she would like to be seen sooner

## 2021-07-30 ENCOUNTER — OFFICE VISIT (OUTPATIENT)
Dept: FAMILY MEDICINE CLINIC | Facility: CLINIC | Age: 66
End: 2021-07-30
Payer: COMMERCIAL

## 2021-07-30 VITALS
BODY MASS INDEX: 24.92 KG/M2 | TEMPERATURE: 98 F | SYSTOLIC BLOOD PRESSURE: 122 MMHG | HEART RATE: 76 BPM | HEIGHT: 64 IN | DIASTOLIC BLOOD PRESSURE: 74 MMHG | OXYGEN SATURATION: 98 % | WEIGHT: 146 LBS

## 2021-07-30 DIAGNOSIS — E03.9 HYPOTHYROIDISM, UNSPECIFIED TYPE: ICD-10-CM

## 2021-07-30 DIAGNOSIS — F32.A ANXIETY AND DEPRESSION: ICD-10-CM

## 2021-07-30 DIAGNOSIS — F41.9 ANXIETY AND DEPRESSION: ICD-10-CM

## 2021-07-30 DIAGNOSIS — I25.10 ARTERIOSCLEROSIS OF CORONARY ARTERY: ICD-10-CM

## 2021-07-30 DIAGNOSIS — F41.9 ANXIETY: ICD-10-CM

## 2021-07-30 DIAGNOSIS — Z23 ENCOUNTER FOR IMMUNIZATION: Primary | ICD-10-CM

## 2021-07-30 DIAGNOSIS — E78.2 MIXED HYPERLIPIDEMIA: ICD-10-CM

## 2021-07-30 PROCEDURE — 90471 IMMUNIZATION ADMIN: CPT

## 2021-07-30 PROCEDURE — 99214 OFFICE O/P EST MOD 30 MIN: CPT | Performed by: FAMILY MEDICINE

## 2021-07-30 PROCEDURE — 90732 PPSV23 VACC 2 YRS+ SUBQ/IM: CPT

## 2021-07-30 RX ORDER — LEVOTHYROXINE SODIUM 75 MCG
75 TABLET ORAL DAILY
Qty: 90 TABLET | Refills: 1 | Status: SHIPPED | OUTPATIENT
Start: 2021-07-30 | End: 2022-01-15 | Stop reason: SDUPTHER

## 2021-07-30 RX ORDER — LORAZEPAM 0.5 MG/1
0.5 TABLET ORAL
Qty: 30 TABLET | Refills: 5 | Status: ON HOLD | OUTPATIENT
Start: 2021-07-30 | End: 2021-12-14 | Stop reason: ALTCHOICE

## 2021-07-30 RX ORDER — ATENOLOL 25 MG/1
37.5 TABLET ORAL DAILY
Qty: 135 TABLET | Refills: 1 | Status: SHIPPED | OUTPATIENT
Start: 2021-07-30 | End: 2022-01-15 | Stop reason: SDUPTHER

## 2021-07-30 RX ORDER — ATENOLOL 25 MG/1
37.5 TABLET ORAL DAILY
Qty: 135 TABLET | Refills: 1 | Status: CANCELLED | OUTPATIENT
Start: 2021-07-30

## 2021-07-30 RX ORDER — ROSUVASTATIN CALCIUM 20 MG/1
TABLET, COATED ORAL
Qty: 38 TABLET | Refills: 5 | Status: SHIPPED | OUTPATIENT
Start: 2021-07-30 | End: 2022-01-15 | Stop reason: SDUPTHER

## 2021-07-30 NOTE — PROGRESS NOTES
BMI Counseling: Body mass index is 25 46 kg/m²  The BMI is above normal  Nutrition recommendations include decreasing portion sizes, encouraging healthy choices of fruits and vegetables, consuming healthier snacks, limiting drinks that contain sugar and moderation in carbohydrate intake  Exercise recommendations include moderate physical activity 150 minutes/week and exercising 3-5 times per week  No pharmacotherapy was ordered  Assessment/Plan:  Hypothyroidism  Her recent TSH was in the desirable range  She appears euthyroid  She will continue on her current dose of Crestor  Arteriosclerosis of coronary artery  She continues with beta-blocker statin and aspirin  Anxiety  She continues with lorazepam at bedtime    Hyperlipidemia  Recent lipid profile was excellent  Continue with Crestor  Anxiety and depression  Currently no vegetative symptoms of depression  She continues to use lorazepam at bedtime for sleep  Diagnoses and all orders for this visit:    Encounter for immunization  -     PNEUMOCOCCAL POLYSACCHARIDE VACCINE 23-VALENT =>3YO SQ IM    Anxiety  -     LORazepam (ATIVAN) 0 5 mg tablet; Take 1 tablet (0 5 mg total) by mouth daily at bedtime    Mixed hyperlipidemia  -     rosuvastatin (Crestor) 20 MG tablet; take 1 tablet by mouth once daily for 5 days then take 2 tablets for 2 days    Arteriosclerosis of coronary artery  -     atenolol (TENORMIN) 25 mg tablet; Take 1 5 tablets (37 5 mg total) by mouth daily    Hypothyroidism, unspecified type  -     Synthroid 75 MCG tablet; Take 1 tablet (75 mcg total) by mouth daily    Anxiety and depression    Other orders  -     Cancel: atenolol (TENORMIN) 25 mg tablet; Take 1 5 tablets (37 5 mg total) by mouth daily          Subjective:   Chief Complaint   Patient presents with    Follow-up     pt here for a med check  bmi f/u plan needed        Patient ID: Ming Ortiz is a 76 y o  female  Good for the most part, no c/o       HPI  The patient is a 54-year-old female who presents today for routine follow-up of multiple medical problems including hypothyroidism, coronary artery disease, hyperlipidemia, chronic insomnia due to medical condition, anxiety in addition to other  She states that overall she is doing well, she offers no complaints  She continues with her healthy diet exercise regimen  She maintains are weight at the top and the ideal body weight range  We congratulated her for same  The following portions of the patient's history were reviewed and updated as appropriate: allergies, current medications, past family history, past medical history, past social history, past surgical history and problem list     Review of Systems   Constitutional: Negative for decreased appetite, malaise/fatigue, weight gain and weight loss  Cardiovascular: Negative for chest pain, irregular heartbeat and leg swelling  Respiratory: Negative for cough, shortness of breath and wheezing  Endocrine: Negative for polydipsia, polyphagia and polyuria  Hematologic/Lymphatic: Negative for adenopathy and bleeding problem  Does not bruise/bleed easily  Gastrointestinal: Negative  Genitourinary: Negative  Neurological: Negative for dizziness and headaches  Psychiatric/Behavioral: The patient has insomnia  Objective:    Physical Exam  Vitals and nursing note reviewed  Constitutional:       Appearance: She is not ill-appearing  Cardiovascular:      Rate and Rhythm: Normal rate and regular rhythm  Heart sounds: No murmur heard  Pulmonary:      Effort: Pulmonary effort is normal  No respiratory distress  Breath sounds: Normal breath sounds  No wheezing or rhonchi  Musculoskeletal:      Right lower leg: No edema  Left lower leg: No edema  Neurological:      Mental Status: She is alert and oriented to person, place, and time  Psychiatric:         Mood and Affect: Mood normal          Thought Content:  Thought content normal  Judgment: Judgment normal          Wt Readings from Last 12 Encounters:   07/30/21 66 2 kg (146 lb)   12/24/20 60 3 kg (133 lb)   12/11/20 60 3 kg (133 lb)   09/22/20 62 1 kg (137 lb)   04/17/20 67 6 kg (149 lb)   12/06/19 76 7 kg (169 lb)   07/19/19 76 7 kg (169 lb)   08/03/18 75 3 kg (166 lb)   07/14/18 69 9 kg (154 lb)   07/07/18 70 8 kg (156 lb)   05/01/18 75 9 kg (167 lb 6 4 oz)   04/02/18 71 2 kg (157 lb)   ]

## 2021-07-30 NOTE — ASSESSMENT & PLAN NOTE
Currently no vegetative symptoms of depression  She continues to use lorazepam at bedtime for sleep

## 2021-07-30 NOTE — ASSESSMENT & PLAN NOTE
Her recent TSH was in the desirable range  She appears euthyroid  She will continue on her current dose of Crestor

## 2021-08-01 DIAGNOSIS — I25.10 ARTERIOSCLEROSIS OF CORONARY ARTERY: ICD-10-CM

## 2021-08-02 RX ORDER — NITROGLYCERIN 0.4 MG/1
0.4 TABLET SUBLINGUAL
Qty: 30 TABLET | Refills: 0 | Status: SHIPPED | OUTPATIENT
Start: 2021-08-02

## 2021-10-08 ENCOUNTER — HOSPITAL ENCOUNTER (OUTPATIENT)
Dept: RADIOLOGY | Facility: HOSPITAL | Age: 66
Discharge: HOME | End: 2021-10-08
Attending: ORTHOPAEDIC SURGERY
Payer: COMMERCIAL

## 2021-10-08 ENCOUNTER — OFFICE VISIT (OUTPATIENT)
Dept: OBGYN CLINIC | Facility: HOSPITAL | Age: 66
End: 2021-10-08
Payer: COMMERCIAL

## 2021-10-08 VITALS
HEIGHT: 64 IN | BODY MASS INDEX: 24.92 KG/M2 | DIASTOLIC BLOOD PRESSURE: 78 MMHG | WEIGHT: 146 LBS | SYSTOLIC BLOOD PRESSURE: 144 MMHG

## 2021-10-08 DIAGNOSIS — M67.40 MUCOID CYST OF JOINT: Primary | ICD-10-CM

## 2021-10-08 DIAGNOSIS — R52 PAIN: ICD-10-CM

## 2021-10-08 PROCEDURE — 1160F RVW MEDS BY RX/DR IN RCRD: CPT | Performed by: ORTHOPAEDIC SURGERY

## 2021-10-08 PROCEDURE — 3008F BODY MASS INDEX DOCD: CPT | Performed by: ORTHOPAEDIC SURGERY

## 2021-10-08 PROCEDURE — 99204 OFFICE O/P NEW MOD 45 MIN: CPT | Performed by: ORTHOPAEDIC SURGERY

## 2021-10-08 PROCEDURE — 73140 X-RAY EXAM OF FINGER(S): CPT

## 2021-10-08 RX ORDER — LIDOCAINE HYDROCHLORIDE AND EPINEPHRINE 10; 10 MG/ML; UG/ML
20 INJECTION, SOLUTION INFILTRATION; PERINEURAL ONCE
Status: CANCELLED | OUTPATIENT
Start: 2021-10-08 | End: 2021-10-08

## 2021-10-11 DIAGNOSIS — I25.10 ARTERIOSCLEROSIS OF CORONARY ARTERY: ICD-10-CM

## 2021-10-11 DIAGNOSIS — Z12.31 ENCOUNTER FOR SCREENING MAMMOGRAM FOR MALIGNANT NEOPLASM OF BREAST: ICD-10-CM

## 2021-10-11 DIAGNOSIS — R73.01 IMPAIRED FASTING GLUCOSE: ICD-10-CM

## 2021-10-11 DIAGNOSIS — E03.9 HYPOTHYROIDISM, UNSPECIFIED TYPE: Primary | ICD-10-CM

## 2021-10-11 DIAGNOSIS — E78.5 HYPERLIPIDEMIA, UNSPECIFIED HYPERLIPIDEMIA TYPE: ICD-10-CM

## 2021-11-24 ENCOUNTER — HOSPITAL ENCOUNTER (OUTPATIENT)
Dept: VASCULAR ULTRASOUND | Facility: HOSPITAL | Age: 66
Discharge: HOME | End: 2021-11-24
Payer: COMMERCIAL

## 2021-11-24 DIAGNOSIS — I25.10 ARTERIOSCLEROSIS OF CORONARY ARTERY: ICD-10-CM

## 2021-11-24 DIAGNOSIS — E78.5 HYPERLIPIDEMIA, UNSPECIFIED HYPERLIPIDEMIA TYPE: ICD-10-CM

## 2021-11-24 PROCEDURE — 93880 EXTRACRANIAL BILAT STUDY: CPT

## 2021-11-24 PROCEDURE — 93880 EXTRACRANIAL BILAT STUDY: CPT | Performed by: SURGERY

## 2021-12-10 ENCOUNTER — APPOINTMENT (OUTPATIENT)
Dept: LAB | Facility: CLINIC | Age: 66
End: 2021-12-10
Payer: COMMERCIAL

## 2021-12-10 DIAGNOSIS — E78.5 HYPERLIPIDEMIA, UNSPECIFIED HYPERLIPIDEMIA TYPE: ICD-10-CM

## 2021-12-10 DIAGNOSIS — R73.01 IMPAIRED FASTING GLUCOSE: ICD-10-CM

## 2021-12-10 DIAGNOSIS — I25.10 ARTERIOSCLEROSIS OF CORONARY ARTERY: ICD-10-CM

## 2021-12-10 DIAGNOSIS — E03.9 HYPOTHYROIDISM, UNSPECIFIED TYPE: ICD-10-CM

## 2021-12-10 LAB
ALBUMIN SERPL BCP-MCNC: 3.7 G/DL (ref 3.5–5)
ALP SERPL-CCNC: 49 U/L (ref 46–116)
ALT SERPL W P-5'-P-CCNC: 25 U/L (ref 12–78)
ANION GAP SERPL CALCULATED.3IONS-SCNC: 6 MMOL/L (ref 4–13)
AST SERPL W P-5'-P-CCNC: 19 U/L (ref 5–45)
BILIRUB SERPL-MCNC: 0.91 MG/DL (ref 0.2–1)
BUN SERPL-MCNC: 18 MG/DL (ref 5–25)
CALCIUM SERPL-MCNC: 9.6 MG/DL (ref 8.3–10.1)
CHLORIDE SERPL-SCNC: 106 MMOL/L (ref 100–108)
CHOLEST SERPL-MCNC: 163 MG/DL
CO2 SERPL-SCNC: 29 MMOL/L (ref 21–32)
CREAT SERPL-MCNC: 0.78 MG/DL (ref 0.6–1.3)
ERYTHROCYTE [DISTWIDTH] IN BLOOD BY AUTOMATED COUNT: 13.6 % (ref 11.6–15.1)
EST. AVERAGE GLUCOSE BLD GHB EST-MCNC: 108 MG/DL
GFR SERPL CREATININE-BSD FRML MDRD: 79 ML/MIN/1.73SQ M
GLUCOSE P FAST SERPL-MCNC: 90 MG/DL (ref 65–99)
HBA1C MFR BLD: 5.4 %
HCT VFR BLD AUTO: 43.1 % (ref 34.8–46.1)
HDLC SERPL-MCNC: 78 MG/DL
HGB BLD-MCNC: 14 G/DL (ref 11.5–15.4)
LDLC SERPL CALC-MCNC: 77 MG/DL (ref 0–100)
MCH RBC QN AUTO: 29.2 PG (ref 26.8–34.3)
MCHC RBC AUTO-ENTMCNC: 32.5 G/DL (ref 31.4–37.4)
MCV RBC AUTO: 90 FL (ref 82–98)
NONHDLC SERPL-MCNC: 85 MG/DL
PLATELET # BLD AUTO: 218 THOUSANDS/UL (ref 149–390)
PMV BLD AUTO: 10.5 FL (ref 8.9–12.7)
POTASSIUM SERPL-SCNC: 3.9 MMOL/L (ref 3.5–5.3)
PROT SERPL-MCNC: 7.3 G/DL (ref 6.4–8.2)
RBC # BLD AUTO: 4.79 MILLION/UL (ref 3.81–5.12)
SODIUM SERPL-SCNC: 141 MMOL/L (ref 136–145)
TRIGL SERPL-MCNC: 42 MG/DL
TSH SERPL DL<=0.05 MIU/L-ACNC: 3.37 UIU/ML (ref 0.36–3.74)
WBC # BLD AUTO: 5.28 THOUSAND/UL (ref 4.31–10.16)

## 2021-12-10 PROCEDURE — 83036 HEMOGLOBIN GLYCOSYLATED A1C: CPT

## 2021-12-10 PROCEDURE — 80053 COMPREHEN METABOLIC PANEL: CPT

## 2021-12-10 PROCEDURE — 36415 COLL VENOUS BLD VENIPUNCTURE: CPT

## 2021-12-10 PROCEDURE — 84443 ASSAY THYROID STIM HORMONE: CPT

## 2021-12-10 PROCEDURE — 85027 COMPLETE CBC AUTOMATED: CPT

## 2021-12-10 PROCEDURE — 80061 LIPID PANEL: CPT

## 2021-12-14 ENCOUNTER — HOSPITAL ENCOUNTER (OUTPATIENT)
Facility: HOSPITAL | Age: 66
Setting detail: OUTPATIENT SURGERY
Discharge: HOME | End: 2021-12-14
Attending: ORTHOPAEDIC SURGERY | Admitting: ORTHOPAEDIC SURGERY
Payer: COMMERCIAL

## 2021-12-14 VITALS
DIASTOLIC BLOOD PRESSURE: 67 MMHG | HEIGHT: 63 IN | HEART RATE: 62 BPM | TEMPERATURE: 95.9 F | BODY MASS INDEX: 24.8 KG/M2 | SYSTOLIC BLOOD PRESSURE: 111 MMHG | OXYGEN SATURATION: 99 % | RESPIRATION RATE: 16 BRPM | WEIGHT: 140 LBS

## 2021-12-14 DIAGNOSIS — M67.40 MUCOID CYST OF JOINT: ICD-10-CM

## 2021-12-14 DIAGNOSIS — M67.40 MUCOID CYST OF JOINT: Primary | ICD-10-CM

## 2021-12-14 PROCEDURE — NC001 PR NO CHARGE: Performed by: ORTHOPAEDIC SURGERY

## 2021-12-14 PROCEDURE — 26160 REMOVE TENDON SHEATH LESION: CPT | Performed by: PHYSICIAN ASSISTANT

## 2021-12-14 PROCEDURE — 26160 REMOVE TENDON SHEATH LESION: CPT | Performed by: ORTHOPAEDIC SURGERY

## 2021-12-14 PROCEDURE — 88304 TISSUE EXAM BY PATHOLOGIST: CPT | Performed by: SPECIALIST

## 2021-12-14 PROCEDURE — 26235 PARTIAL REMOVAL FINGER BONE: CPT | Performed by: ORTHOPAEDIC SURGERY

## 2021-12-14 PROCEDURE — 26235 PARTIAL REMOVAL FINGER BONE: CPT | Performed by: PHYSICIAN ASSISTANT

## 2021-12-14 RX ORDER — SENNOSIDES 8.6 MG
650 CAPSULE ORAL EVERY 8 HOURS
Qty: 15 TABLET | Refills: 0 | Status: SHIPPED | OUTPATIENT
Start: 2021-12-14 | End: 2022-01-14 | Stop reason: ALTCHOICE

## 2021-12-14 RX ORDER — HYDROCODONE BITARTRATE AND ACETAMINOPHEN 5; 325 MG/1; MG/1
1 TABLET ORAL EVERY 6 HOURS PRN
Qty: 5 TABLET | Refills: 0 | Status: SHIPPED | OUTPATIENT
Start: 2021-12-14 | End: 2021-12-19

## 2021-12-14 RX ORDER — LIDOCAINE HYDROCHLORIDE AND EPINEPHRINE 10; 10 MG/ML; UG/ML
20 INJECTION, SOLUTION INFILTRATION; PERINEURAL ONCE
Status: DISCONTINUED | OUTPATIENT
Start: 2021-12-14 | End: 2021-12-14 | Stop reason: HOSPADM

## 2021-12-14 RX ORDER — MAGNESIUM HYDROXIDE 1200 MG/15ML
LIQUID ORAL AS NEEDED
Status: DISCONTINUED | OUTPATIENT
Start: 2021-12-14 | End: 2021-12-14 | Stop reason: HOSPADM

## 2021-12-22 ENCOUNTER — OFFICE VISIT (OUTPATIENT)
Dept: OBGYN CLINIC | Facility: HOSPITAL | Age: 66
End: 2021-12-22

## 2021-12-22 VITALS
SYSTOLIC BLOOD PRESSURE: 114 MMHG | HEART RATE: 76 BPM | DIASTOLIC BLOOD PRESSURE: 74 MMHG | HEIGHT: 63 IN | BODY MASS INDEX: 21.16 KG/M2 | WEIGHT: 119.4 LBS

## 2021-12-22 DIAGNOSIS — Z47.89 AFTERCARE FOLLOWING SURGERY OF THE MUSCULOSKELETAL SYSTEM: Primary | ICD-10-CM

## 2021-12-22 PROCEDURE — 99024 POSTOP FOLLOW-UP VISIT: CPT | Performed by: ORTHOPAEDIC SURGERY

## 2022-01-14 ENCOUNTER — TELEMEDICINE (OUTPATIENT)
Dept: FAMILY MEDICINE CLINIC | Facility: CLINIC | Age: 67
End: 2022-01-14
Payer: COMMERCIAL

## 2022-01-14 VITALS
SYSTOLIC BLOOD PRESSURE: 114 MMHG | HEIGHT: 63 IN | BODY MASS INDEX: 20.73 KG/M2 | WEIGHT: 117 LBS | DIASTOLIC BLOOD PRESSURE: 68 MMHG

## 2022-01-14 DIAGNOSIS — E78.5 HYPERLIPIDEMIA, UNSPECIFIED HYPERLIPIDEMIA TYPE: ICD-10-CM

## 2022-01-14 DIAGNOSIS — I25.10 ARTERIOSCLEROSIS OF CORONARY ARTERY: ICD-10-CM

## 2022-01-14 DIAGNOSIS — E03.9 HYPOTHYROIDISM, UNSPECIFIED TYPE: Primary | ICD-10-CM

## 2022-01-14 PROBLEM — B94.8 POST-LYME DISEASE SYNDROME: Status: RESOLVED | Noted: 2017-05-02 | Resolved: 2022-01-14

## 2022-01-14 PROBLEM — G50.0 TRIGEMINAL NEURALGIA PAIN: Status: RESOLVED | Noted: 2017-12-07 | Resolved: 2022-01-14

## 2022-01-14 PROBLEM — G89.4 CHRONIC PAIN DISORDER: Status: RESOLVED | Noted: 2017-05-11 | Resolved: 2022-01-14

## 2022-01-14 PROBLEM — H40.043 STEROID RESPONDERS TO GLAUCOMA OF BOTH EYES: Status: RESOLVED | Noted: 2017-12-07 | Resolved: 2022-01-14

## 2022-01-14 PROBLEM — G47.01 INSOMNIA DUE TO MEDICAL CONDITION: Status: RESOLVED | Noted: 2017-08-14 | Resolved: 2022-01-14

## 2022-01-14 PROBLEM — H35.53: Status: RESOLVED | Noted: 2017-04-06 | Resolved: 2022-01-14

## 2022-01-14 PROBLEM — F32.A ANXIETY AND DEPRESSION: Status: RESOLVED | Noted: 2018-04-02 | Resolved: 2022-01-14

## 2022-01-14 PROBLEM — H53.143 PHOTOPHOBIA OF BOTH EYES: Status: RESOLVED | Noted: 2017-12-07 | Resolved: 2022-01-14

## 2022-01-14 PROBLEM — F41.9 ANXIETY AND DEPRESSION: Status: RESOLVED | Noted: 2018-04-02 | Resolved: 2022-01-14

## 2022-01-14 PROBLEM — H26.9 CATARACT OF BOTH EYES: Status: RESOLVED | Noted: 2020-09-22 | Resolved: 2022-01-14

## 2022-01-14 PROBLEM — R17 SERUM TOTAL BILIRUBIN ELEVATED: Status: RESOLVED | Noted: 2020-12-11 | Resolved: 2022-01-14

## 2022-01-14 PROBLEM — H30.123: Status: RESOLVED | Noted: 2017-04-06 | Resolved: 2022-01-14

## 2022-01-14 PROCEDURE — 1160F RVW MEDS BY RX/DR IN RCRD: CPT | Performed by: FAMILY MEDICINE

## 2022-01-14 PROCEDURE — 99214 OFFICE O/P EST MOD 30 MIN: CPT | Performed by: FAMILY MEDICINE

## 2022-01-14 RX ORDER — LORAZEPAM 0.5 MG/1
TABLET ORAL
COMMUNITY
End: 2022-06-28 | Stop reason: SDUPTHER

## 2022-01-14 RX ORDER — BIOTIN 10000 MCG
CAPSULE ORAL
COMMUNITY

## 2022-01-14 NOTE — ASSESSMENT & PLAN NOTE
Patient presents today for routine follow-up of primary hypothyroidism  She appears euthyroid on exam, TSH confirm same  She has had some recent hair loss  I believe this is most likely telogen effluvium from her aggressive weight loss  I believe this is going to respond to weight maintenance and more normalized diet going forward    She will continue to observe

## 2022-01-14 NOTE — PROGRESS NOTES
Virtual Regular Visit    Verification of patient location:    Patient is located in the following state in which I hold an active license [place]      Assessment/Plan:    Problem List Items Addressed This Visit        Endocrine    Hypothyroidism - Primary     Patient presents today for routine follow-up of primary hypothyroidism  She appears euthyroid on exam, TSH confirm same  She has had some recent hair loss  I believe this is most likely telogen effluvium from her aggressive weight loss  I believe this is going to respond to weight maintenance and more normalized diet going forward  She will continue to observe            Cardiovascular and Mediastinum    Arteriosclerosis of coronary artery     Blood pressure excellent, patient asymptomatic  Continue aspirin, Crestor and atenolol  Other    Hyperlipidemia     Continue with Crestor, lipid profile reviewed with patient  Continue efforts at diet and exercise  Reason for visit is   Chief Complaint   Patient presents with    Follow-up     Virtual med check    Virtual Regular Visit        Encounter provider Celia Garcia MD    Provider located at [address]  [address] [facility] [address]      Recent Visits  No visits were found meeting these conditions  Showing recent visits within past 7 days and meeting all other requirements  Today's Visits  Date Type Provider Dept   01/14/22 Telemedicine Celia Garcia MD [facility]   Showing today's visits and meeting all other requirements  Future Appointments  No visits were found meeting these conditions  Showing future appointments within next 150 days and meeting all other requirements       The patient was identified by name and date of birth  Jerrell Wayne was informed that this is a telemedicine visit and that the visit is being conducted through [employer] and patient was informed that this is a secure, HIPAA-compliant platform  She agrees to proceed     My office door was closed  No one else was in the room  She acknowledged consent and understanding of privacy and security of the video platform  The patient has agreed to participate and understands they can discontinue the visit at any time  Patient is aware this is a billable service  Subjective  Femi Tony is a 78 y o  female   I lost hair due to diet  ? Of iron deficiency / keratin  / Zinc        HPI   The patient is a 25-year-old female who presents today virtually for follow-up of multiple medical problems  These include hypothyroidism, hyperlipidemia, arterial sclerosis of coronary artery, anxiety as well as other  She is compliant with her Synthroid  She has noted significant hair loss which she feels is related to significant weight loss through dietary measures  She is concerned about possible iron deficiency  She did have recent blood work which we reviewed  We noted her TSH to be in the 3 range  Her lipid profile looks good, LDL 77 not quite at goal for history of coronary disease but overall excellent  She denies cardiovascular or pulmonary complaint    Past Medical History:   Diagnosis Date    Anxiety     Coronary artery disease     Disease of thyroid gland     Ganglion cyst of wrist, right     last assessed nov 4 2016    History of Lyme disease     Presence of stent in LAD coronary artery     last assessed april 14 2017    Serum total bilirubin elevated 12/11/2020    Status post de Quervain's release surgery     last assessed dec 16 2016       Past Surgical History:   Procedure Laterality Date    BREAST BIOPSY Left     CORONARY ANGIOPLASTY WITH STENT PLACEMENT      GANGLION CYST EXCISION Right 12/8/2016    Procedure: EXCISION GANGLION CYST right first dorsal extensor compartment;  Surgeon: Denton Ryan MD;  Location: [facility];  Service:     DC HALLUX RIGIDUS W/CHEILECTOMY 1ST MP JT W/O IMPLT Right 12/14/2021    Procedure: right index finger MUCOID CYST excision WITH CHEILECTOMY;  Surgeon: Laura Calzada MD;  [facility];  Service: Orthopedics    VA INCIS TENDON SHEATH,RADIAL STYLOID Right 12/8/2016    Procedure: Leidy Avalos;  Surgeon: Laura Calzada MD;  Location: [facility];  Service: Orthopedics    TONSILLECTOMY         Current Outpatient Medications   Medication Sig Dispense Refill    Acetylcysteine (NAC PO) Take by mouth 2 (two) times a day      aspirin 81 MG tablet Take 81 mg by mouth daily      atenolol (TENORMIN) 25 mg tablet Take 1 5 tablets (37 5 mg total) by mouth daily 135 tablet 1    Biotin 10 MG CAPS       cholecalciferol (VITAMIN D3) 1,000 units tablet Take by mouth      Cyanocobalamin (Vitamin B 12) 500 MCG TABS       LORazepam (ATIVAN) 0 5 mg tablet       Lutein 10 MG TABS 40 mg       nitroglycerin (Nitrostat) 0 4 mg SL tablet Place 1 tablet (0 4 mg total) under the tongue every 5 (five) minutes as needed for chest pain 30 tablet 0    rosuvastatin (Crestor) 20 MG tablet take 1 tablet by mouth once daily for 5 days then take 2 tablets for 2 days 38 tablet 5    Synthroid 75 MCG tablet Take 1 tablet (75 mcg total) by mouth daily 90 tablet 1     No current facility-administered medications for this visit  Allergies   Allergen Reactions    Tetracaine      Other reaction(s): Other (See Comments)  Eye Irritation, Burning  Other reaction(s): Other (See Comments)  Eye Irritation, Burning    Benzalkonium Chloride Other (See Comments)     Eye redness and pain       Review of Systems    Video Exam    Vitals:    01/14/22 1434   BP: 114/68   Weight: 53 1 kg (117 lb)   Height: 5' 3" (1 6 m)       Physical Exam  Vitals and nursing note reviewed  Constitutional:       Appearance: Normal appearance  Pulmonary:      Effort: Pulmonary effort is normal  No respiratory distress  Musculoskeletal:      Right lower leg: No edema  Left lower leg: No edema     Neurological:      Mental Status: She is alert and oriented to person, place, and time  Psychiatric:         Mood and Affect: Mood normal          Thought Content: Thought content normal          Judgment: Judgment normal           I spent 15 minutes directly with the patient during this visit    Robby Lara verbally agrees to participate in [employer]  Pt is aware that [employer] could be limited without vital signs or the ability to perform a full hands-on physical exam  Shakira Trejo understands she or the provider may request at any time to terminate the video visit and request the patient to seek care or treatment in person

## 2022-01-15 DIAGNOSIS — E03.9 HYPOTHYROIDISM, UNSPECIFIED TYPE: ICD-10-CM

## 2022-01-15 DIAGNOSIS — E78.2 MIXED HYPERLIPIDEMIA: ICD-10-CM

## 2022-01-15 DIAGNOSIS — I25.10 ARTERIOSCLEROSIS OF CORONARY ARTERY: ICD-10-CM

## 2022-01-15 RX ORDER — ATENOLOL 25 MG/1
37.5 TABLET ORAL DAILY
Qty: 135 TABLET | Refills: 1 | Status: SHIPPED | OUTPATIENT
Start: 2022-01-15 | End: 2022-08-01

## 2022-01-15 RX ORDER — ROSUVASTATIN CALCIUM 20 MG/1
TABLET, FILM COATED ORAL
Qty: 38 TABLET | Refills: 5 | Status: SHIPPED | OUTPATIENT
Start: 2022-01-15 | End: 2022-07-02 | Stop reason: SDUPTHER

## 2022-01-15 RX ORDER — LEVOTHYROXINE SODIUM 75 MCG
75 TABLET ORAL DAILY
Qty: 90 TABLET | Refills: 1 | Status: SHIPPED | OUTPATIENT
Start: 2022-01-15

## 2022-01-17 ENCOUNTER — TELEPHONE (OUTPATIENT)
Dept: OBGYN CLINIC | Facility: HOSPITAL | Age: 67
End: 2022-01-17

## 2022-01-17 NOTE — TELEPHONE ENCOUNTER
Patient returning Jamilah's call  I cannot schedule an appt with Claytondora Berna as her schedule is full  Do we request a force on for that?     Patient callback ZN#[phone]

## 2022-01-17 NOTE — TELEPHONE ENCOUNTER
Left patient a voicemail to schedule the next p/o with [facility] 1/28 @ [place], as advised by hand/wrist scheduling team, since Lorenzo's schedule is full

## 2022-01-17 NOTE — TELEPHONE ENCOUNTER
----- Message from Thad Alejo PA-C sent at 1/17/2022  9:40 AM EST -----  Regarding: FW: Post Surgery Question  Please schedule patient for assessment with either myself on 1/28 or [facility]    ----- Message -----  From: Sumeet Winter  Sent: 1/17/2022   9:18 AM EST  To: Zeke Hein MD  Subject: FW: Post Surgery Question                          ----- Message -----  From: Jerrell Wayne  Sent: 1/16/2022   5:21 PM EST  To: [facility]  Subject: Myah Diaz                            Dear Dr Scott Rice,    It's been about 1 month since my surgery  Still having pain/discomfort when the finger brushes up on something  And there is a  protrusion where the cyst was  A few photos are attached  Is the sensation I am experiencing and the appearance normal 1-month after surgery?     Thank you,  Royce Robertson

## 2022-01-28 ENCOUNTER — OFFICE VISIT (OUTPATIENT)
Dept: OBGYN CLINIC | Facility: HOSPITAL | Age: 67
End: 2022-01-28

## 2022-01-28 VITALS
SYSTOLIC BLOOD PRESSURE: 131 MMHG | HEART RATE: 66 BPM | WEIGHT: 128 LBS | BODY MASS INDEX: 22.68 KG/M2 | DIASTOLIC BLOOD PRESSURE: 82 MMHG | HEIGHT: 63 IN

## 2022-01-28 DIAGNOSIS — Z47.89 AFTERCARE FOLLOWING SURGERY OF THE MUSCULOSKELETAL SYSTEM: ICD-10-CM

## 2022-01-28 DIAGNOSIS — M67.40 MUCOID CYST OF JOINT: Primary | ICD-10-CM

## 2022-01-28 PROCEDURE — 99024 POSTOP FOLLOW-UP VISIT: CPT | Performed by: PHYSICIAN ASSISTANT

## 2022-01-28 NOTE — PROGRESS NOTES
S/P right index finger MUCOID CYST excision WITH CHEILECTOMY - Right on 12/14/2021 (Dr Delfina Byrd)  Today, the patient states she continues to have pain and erythema at the area of excision with any pressure  She does not have any decreased range of motion she did not have this pain prior to surgery  On examination the dorsal side the area of mucoid cyst at the DIP there is a palpable subcutaneous lump that is mildly painful tender to palpation with associated swelling and erythema  No sensory changes  No range-of-motion restrictions  No nail involvement  No signs of infection at the excision site  Vitals:    01/28/22 0758   BP: 131/82   Pulse: 66       Will send to Hand therapy for swelling management  Finger sleeve provided for compression  Can continue ice and elevation  F/U 4-6 weeks for re-evaluation  If symptoms resolve, can cancel appointment

## 2022-02-02 ENCOUNTER — APPOINTMENT (OUTPATIENT)
Dept: OCCUPATIONAL THERAPY | Facility: CLINIC | Age: 67
End: 2022-02-02

## 2022-02-10 ENCOUNTER — EVALUATION (OUTPATIENT)
Dept: OCCUPATIONAL THERAPY | Facility: CLINIC | Age: 67
End: 2022-02-10

## 2022-02-10 DIAGNOSIS — Z47.89 AFTERCARE FOLLOWING SURGERY OF THE MUSCULOSKELETAL SYSTEM: ICD-10-CM

## 2022-02-10 DIAGNOSIS — M67.40 MUCOID CYST OF JOINT: ICD-10-CM

## 2022-02-10 PROCEDURE — 97165 OT EVAL LOW COMPLEX 30 MIN: CPT | Performed by: OCCUPATIONAL THERAPIST

## 2022-02-10 NOTE — PROGRESS NOTES
OT Evaluation     Today's date: 2/10/2022  Patient name: Myrna Dunn  : 1955  MRN: [id number]  Referring provider: Kelvin Barba MD  Dx:   Encounter Diagnosis     ICD-10-CM    1  Mucoid cyst of joint  M67 40 Ambulatory Referral to PT/OT Hand Therapy   2  Aftercare following surgery of the musculoskeletal system  Z47 89 Ambulatory Referral to PT/OT Hand Therapy                  Assessment  Assessment details: Antonia Bender was referred to therapy s/p right index finger mucoid cyst excision with cheilectomy on 21  She presents to therapy today with mild swelling at the DIP joint  Her finger AROM is WFL and 2-point discrimination intact  Her pinch strength is mildly reduced due to pain  She also demonstrates hypersensitivity in the distal part of finger  She was provided with a sensory tool to reduce hypersensitivity  A HEP was incorporated with yellow theraputty to increase functional use of finger with , pinch, adduction, and finger flexion/extension exercises  Antonia Bender was also instructed on how to properly massage scar tissue at home  Antonia eBnder would benefit from skilled OT services to reduce pain, reduce swelling, increase strength, decrease hypersensitivity, increase activity tolerance and increase functional use of finger in ADLs/IADLs  Impairments: activity intolerance, lacks appropriate home exercise program and pain with function    Goals  STG: Patient will be independent with HEP in 1 week  STG: Pain will be reduced to a 4/10 during functional use within 4 weeks  STG: Swelling will be reduced to 4 3 cm within 4 weeks  STG: Pinch strength will be increased to within 10% of contralateral side within 4 weeks  STG: Subjective reports of numbness and tingling in finger will decrease within 4 weeks  LTG: Pain will be reduced to a 0/10 during functional use within 6-8 weeks or by discharge  LTG: Swelling will be reduced to a 4 1 cm within 6-8 weeks or by discharge     LTG: Pinch strength will be equal to contralateral side with no pain within 6-8 weeks or by discharge  LTG: FOTO score will increase by at least 9 points by discharge  Plan  Patient would benefit from: OT eval and skilled occupational therapy  Planned modality interventions: thermotherapy: hydrocollator packs, thermotherapy: paraffin bath and ultrasound  Planned therapy interventions: massage, strengthening, therapeutic exercise, therapeutic activities, home exercise program and manual therapy  Frequency: 1x week  Duration in visits: 10  Plan of Care beginning date: 2/10/2022  Plan of Care expiration date: 4/10/2022  Treatment plan discussed with: patient        Subjective Evaluation    History of Present Illness  Date of surgery: 2021  Mechanism of injury: surgery  Mechanism of injury: Velvet Plascencia reports having a surgery to remove a cyst on her right index finger at the DIP joint on 21  The cyst formed 9 months prior to surgery but there was no pain with the cyst  She states since the surgery, she has been experiencing a constant pain at the incision site  The pain is increased when she touches or rubs the index finger  Due to this pain, she has avoided using the finger  She also experiences pins and needles on the volar side of the finger  She was given a compression sleeve from Dr Fran Kern office and this slightly helped with the pain       Pain  Current pain ratin  At worst pain ratin (rubbing it )  Aggravating factors: lifting    Social Support    Employment status: not working  Hand dominance: right    Treatments  Current treatment: occupational therapy  Patient Goals  Patient goals for therapy: decreased pain          Objective     Observations     Additional Observation Details  Minimal superficial scar tissue   Redness at DIP joint  Swollen at DIP joint       Neurological Testing     Sensation     Wrist/Hand     Right   Intact: static two point discrimination    Comments   Right static two point discrimination: 2    Active Range of Motion     Left Digits    Flexion   Index     MCP: 90    PIP: 108    DIP: 50    Right Digits   Flexion   Index     MCP: 102    PIP: 104    DIP: 54    Strength/Myotome Testing     Left Wrist/Hand      (2nd hand position)     Trial 1: 53 8    Thumb Strength  Key/Lateral Pinch     Trial 1: 10 5  Tip/Two-Point Pinch     Trial 1: 9  Palmar/Three-Point Pinch     Trial 1: 11 3    Right Wrist/Hand      (2nd hand position)     Trial 1: 57 1    Thumb Strength   Key/Lateral Pinch     Trial 1: 12 1    Comments: Pain with all 3 pinches 4/10  Tip/Two-Point Pinch     Trial 1: 6 5  Palmar/Three-Point Pinch     Trial 1: 9 7    Swelling     Left Wrist/Hand   Index     Distal: 4 cm    Right Wrist/Hand   Index     Distal: 4 5 cm             Precautions: Universal      Manuals 2/10            IASTM                                                    Neuro Re-Ed             Rice box             Pasta box                                                                              Ther Ex             HEP: yellow theraputty , pinch, adduction, finger extension/flexion; sensation  issued            Key pegs              Digi flex             Pinch ring              Wall walking                                                     Ther Activity                                       Gait Training                                       Modalities             Paraffin 8'

## 2022-02-11 ENCOUNTER — HOSPITAL ENCOUNTER (OUTPATIENT)
Dept: MAMMOGRAPHY | Facility: CLINIC | Age: 67
Discharge: HOME | End: 2022-02-11
Payer: COMMERCIAL

## 2022-02-11 VITALS — BODY MASS INDEX: 20.14 KG/M2 | HEIGHT: 64 IN | WEIGHT: 118 LBS

## 2022-02-11 DIAGNOSIS — Z12.31 ENCOUNTER FOR SCREENING MAMMOGRAM FOR MALIGNANT NEOPLASM OF BREAST: ICD-10-CM

## 2022-02-11 PROCEDURE — 77067 SCR MAMMO BI INCL CAD: CPT

## 2022-02-11 PROCEDURE — 77063 BREAST TOMOSYNTHESIS BI: CPT

## 2022-02-18 ENCOUNTER — OFFICE VISIT (OUTPATIENT)
Dept: OCCUPATIONAL THERAPY | Facility: CLINIC | Age: 67
End: 2022-02-18

## 2022-02-18 DIAGNOSIS — M67.40 MUCOID CYST OF JOINT: Primary | ICD-10-CM

## 2022-02-18 DIAGNOSIS — Z47.89 AFTERCARE FOLLOWING SURGERY OF THE MUSCULOSKELETAL SYSTEM: ICD-10-CM

## 2022-02-18 PROCEDURE — 97110 THERAPEUTIC EXERCISES: CPT | Performed by: OCCUPATIONAL THERAPIST

## 2022-02-18 PROCEDURE — 97140 MANUAL THERAPY 1/> REGIONS: CPT | Performed by: OCCUPATIONAL THERAPIST

## 2022-02-18 NOTE — PROGRESS NOTES
Daily Note     Today's date: 2022  Patient name: Thelma Johnson  : 1955  MRN: [id number]  Referring provider: Anyi Moreno MD  Dx:   Encounter Diagnosis     ICD-10-CM    1  Mucoid cyst of joint  M67 40    2  Aftercare following surgery of the musculoskeletal system  Z47 89                   Subjective: She states that the finger is slightly better, but is still painful with use  Objective: See treatment diary below  Assessment: Tolerated treatment well  Patient would benefit from continued OT      Plan: Progress treatment as tolerated  Can upgrade HEP next visit       Precautions: Universal      Manuals 2/10 2/18           IASTM  8'                                                  Neuro Re-Ed             Rice box  3 mins (11 found)           Pasta box  3 mins (7 found)                                                                            Ther Ex             HEP: yellow theraputty , pinch, adduction, finger extension/flexion; sensation  issued Issued silipos sleeves x2           Intrinsic stretch  2'           Key pegs   1x translation            Digi flex  Green isolated 20x           Pinch ring   R/G x2           Wall walking   Green ball 3x up and down                                                  Ther Activity             Jar turning  Orange 5x                        Gait Training                                       Modalities             Paraffin 8' 8'

## 2022-02-25 ENCOUNTER — APPOINTMENT (OUTPATIENT)
Dept: OCCUPATIONAL THERAPY | Facility: CLINIC | Age: 67
End: 2022-02-25

## 2022-03-18 ENCOUNTER — OFFICE VISIT (OUTPATIENT)
Dept: OCCUPATIONAL THERAPY | Facility: HOSPITAL | Age: 67
End: 2022-03-18

## 2022-03-18 ENCOUNTER — HOSPITAL ENCOUNTER (OUTPATIENT)
Dept: RADIOLOGY | Facility: HOSPITAL | Age: 67
Discharge: HOME | End: 2022-03-18
Attending: ORTHOPAEDIC SURGERY
Payer: COMMERCIAL

## 2022-03-18 ENCOUNTER — OFFICE VISIT (OUTPATIENT)
Dept: OBGYN CLINIC | Facility: HOSPITAL | Age: 67
End: 2022-03-18
Payer: COMMERCIAL

## 2022-03-18 VITALS
HEART RATE: 68 BPM | SYSTOLIC BLOOD PRESSURE: 137 MMHG | BODY MASS INDEX: 24.21 KG/M2 | DIASTOLIC BLOOD PRESSURE: 82 MMHG | WEIGHT: 141.8 LBS | HEIGHT: 64 IN

## 2022-03-18 DIAGNOSIS — Z47.89 AFTERCARE FOLLOWING SURGERY OF THE MUSCULOSKELETAL SYSTEM: Primary | ICD-10-CM

## 2022-03-18 DIAGNOSIS — M67.40 MUCOID CYST OF JOINT: Primary | ICD-10-CM

## 2022-03-18 DIAGNOSIS — Z47.89 AFTERCARE FOLLOWING SURGERY OF THE MUSCULOSKELETAL SYSTEM: ICD-10-CM

## 2022-03-18 PROCEDURE — 73140 X-RAY EXAM OF FINGER(S): CPT

## 2022-03-18 PROCEDURE — 1160F RVW MEDS BY RX/DR IN RCRD: CPT | Performed by: ORTHOPAEDIC SURGERY

## 2022-03-18 PROCEDURE — 99213 OFFICE O/P EST LOW 20 MIN: CPT | Performed by: ORTHOPAEDIC SURGERY

## 2022-03-18 PROCEDURE — 3008F BODY MASS INDEX DOCD: CPT | Performed by: ORTHOPAEDIC SURGERY

## 2022-03-18 RX ORDER — CHLORAL HYDRATE 500 MG
1100 CAPSULE ORAL DAILY
COMMUNITY

## 2022-03-18 NOTE — PROGRESS NOTES
ASSESSMENT/PLAN:    Assessment:   Arthritis of the right index finger DIP joint  - s/p mucoid cyst excision on 12/14/2021    Plan:   Therapy  - focus on desensitization and swelling control    Follow Up:  PRN    General Discussions:  Osteoarthritis:  The anatomy and physiology of osteoarthritis was discussed with the patient today in the office  Deterioration of the articular cartilage eventually leads to altered mobility at the joint, resulting in joint subluxation, osteophyte formation, cystic changes, as well as subchondral sclerosis  Eventually, pain, limited mobility, and compensatory hypermobility at surrounding joints may develop  While normal activity and usage of the joint may provide a painful experience to the patient, this typically does not result in damage to the limb  Treatment options include splints to decreased joint edema, pain, and inflammation  Therapy exercises to strengthen the surrounding musculature may relieve pain, but do not alter the overall continued development of osteoarthritis  Oral medications, topical medications, corticosteroid injections may decrease pain and increase overall function  Eventually, some patients may require surgical intervention  _____________________________________________________  CHIEF COMPLAINT:  Chief Complaint   Patient presents with    Right Index Finger - Follow-up         SUBJECTIVE:  Thelma Johnson is a 78 y o  female who presents for follow up regarding right index finger mucoid cyst excision (12/14/2021)  Patient has been having a lot of pain in the finger  She has a lot of sensitivity if anything touches over the surgical site  She states that using the finger for direct pressure activities bother the finger  She has done therapy, but is not sure if she did direct desensitization therapy        PAST MEDICAL HISTORY:  Past Medical History:   Diagnosis Date    Anxiety     Coronary artery disease     Disease of thyroid gland     Ganglion cyst of wrist, right     last assessed nov 4 2016    History of Lyme disease     Presence of stent in LAD coronary artery     last assessed april 14 2017    Serum total bilirubin elevated 12/11/2020    Status post de Quervain's release surgery     last assessed dec 16 2016       PAST SURGICAL HISTORY:  Past Surgical History:   Procedure Laterality Date    BREAST EXCISIONAL BIOPSY Left 1996    benign    CORONARY ANGIOPLASTY WITH STENT PLACEMENT      GANGLION CYST EXCISION Right 12/8/2016    Procedure: EXCISION GANGLION CYST right first dorsal extensor compartment;  Surgeon: Dagoberto Harvey MD;  [facility]: [facility];  Service:     NM HALLUX RIGIDUS W/CHEILECTOMY 1ST MP JT W/O IMPLT Right 12/14/2021    Procedure: right index finger MUCOID CYST excision WITH CHEILECTOMY;  Surgeon: Dagoberto Harvey MD;  Location:  MAIN OR;  Service: Orthopedics    NM [address] Right 12/8/2016    Procedure: [place];  Surgeon: Dagoberto Harvey MD;  [facility];  Service: Orthopedics    TONSILLECTOMY         FAMILY HISTORY:  Family History   Problem Relation Age of Onset    Heart disease Father     No Known Problems Mother     Hyperlipidemia Sister     Thyroid disease Sister     Stroke Brother     Heart failure Maternal Grandmother     No Known Problems Maternal Grandfather     No Known Problems Paternal Grandmother     No Known Problems Paternal Grandfather     Breast cancer Paternal Aunt         unknown primary with mets to breast-unknown age   [facility] Cancer Paternal Aunt 76        hip-bone    No Known Problems Daughter     No Known Problems Daughter     No Known Problems Daughter     No Known Problems Sister     No Known Problems Sister     No Known Problems Maternal Aunt     No Known Problems Maternal Aunt     No Known Problems Maternal Aunt     No Known Problems Maternal Aunt     Lung cancer Paternal Aunt         heavy smoker-unknown age       SOCIAL HISTORY:  Social History     Tobacco Use    Smoking status: Never Smoker    Smokeless tobacco: Never Used   Vaping Use    Vaping Use: Never used   Substance Use Topics    Alcohol use: No    Drug use: No       MEDICATIONS:    Current Outpatient Medications:     Acetylcysteine (NAC PO), Take by mouth 2 (two) times a day, Disp: , Rfl:     aspirin 81 MG tablet, Take 81 mg by mouth daily, Disp: , Rfl:     atenolol (TENORMIN) 25 mg tablet, Take 1 5 tablets (37 5 mg total) by mouth daily, Disp: 135 tablet, Rfl: 1    Biotin 10 MG CAPS, , Disp: , Rfl:     cholecalciferol (VITAMIN D3) 1,000 units tablet, Take by mouth, Disp: , Rfl:     Crestor 20 MG tablet, take 1 tablet by mouth once daily for 5 days then take 2 tablets for 2 days, Disp: 38 tablet, Rfl: 5    Cyanocobalamin (Vitamin B 12) 500 MCG TABS, , Disp: , Rfl:     LORazepam (ATIVAN) 0 5 mg tablet, , Disp: , Rfl:     Lutein 10 MG TABS, 40 mg , Disp: , Rfl:     nitroglycerin (Nitrostat) 0 4 mg SL tablet, Place 1 tablet (0 4 mg total) under the tongue every 5 (five) minutes as needed for chest pain, Disp: 30 tablet, Rfl: 0    Omega-3 Fatty Acids (fish oil) 1,000 mg, Take 1,100 mg by mouth daily, Disp: , Rfl:     Synthroid 75 MCG tablet, Take 1 tablet (75 mcg total) by mouth daily, Disp: 90 tablet, Rfl: 1    ALLERGIES:  Allergies   Allergen Reactions    Tetracaine      Other reaction(s): Other (See Comments)  Eye Irritation, Burning  Other reaction(s): Other (See Comments)  Eye Irritation, Burning    Benzalkonium Chloride Other (See Comments)     Eye redness and pain       REVIEW OF SYSTEMS:  Pertinent items are noted in HPI  A comprehensive review of systems was negative      LABS:  HgA1c:   Lab Results   Component Value Date    HGBA1C 5 4 12/10/2021     BMP:   Lab Results   Component Value Date    CALCIUM 9 6 12/10/2021     04/14/2017    K 3 9 12/10/2021    CO2 29 12/10/2021     12/10/2021    BUN 18 12/10/2021    CREATININE 0 78 12/10/2021 _____________________________________________________  PHYSICAL EXAMINATION:  Vital signs: /82   Pulse 68   Ht 5' 4" (1 626 m)   Wt 64 3 kg (141 lb 12 8 oz)   BMI 24 34 kg/m²   General: well developed and well nourished, alert, oriented times 3 and appears comfortable  Psychiatric: Normal  HEENT: Trachea Midline, No torticollis  Cardiovascular: No discernable arrhythmia  Pulmonary: No wheezing or stridor  Abdomen: No rebound or guarding  Extremities: No peripheral edema  Skin: No masses, erythema, lacerations, fluctation, ulcerations  Neurovascular: Sensation Intact to the Median, Ulnar, Radial Nerve, Motor Intact to the Median, Ulnar, Radial Nerve and Pulses Intact    MUSCULOSKELETAL EXAMINATION:  Right Index Finger: Well healed surgical incision  - hypersensitivity over the scar tissue  Pain with stressing the DIP joint  No instability of the DIP joint  Sensation intact  Brisk capillary refill      _____________________________________________________  STUDIES REVIEWED:  Images were reviewed in PACS by Dr Charan Sharp and demonstrate: Degenerative changes seen in the DIP joint of the index finger with narrowing on the ulnar side         PROCEDURES PERFORMED:  Procedures  No Procedures performed today    Scribe Attestation    I,:  Nikkie Salazar PA-C am acting as a scribe while in the presence of the attending physician :       I,:  Leopold Mitts, MD personally performed the services described in this documentation    as scribed in my presence :

## 2022-06-28 DIAGNOSIS — F41.9 ANXIETY DISORDER, UNSPECIFIED TYPE: Primary | ICD-10-CM

## 2022-06-28 DIAGNOSIS — F41.9 ANXIETY DISORDER, UNSPECIFIED TYPE: ICD-10-CM

## 2022-06-28 RX ORDER — LORAZEPAM 0.5 MG/1
0.5 TABLET ORAL
Qty: 15 TABLET | Refills: 0 | OUTPATIENT
Start: 2022-06-28

## 2022-06-28 RX ORDER — LORAZEPAM 0.5 MG/1
0.5 TABLET ORAL
Qty: 15 TABLET | Refills: 0 | Status: SHIPPED | OUTPATIENT
Start: 2022-06-28 | End: 2022-07-19

## 2022-07-02 DIAGNOSIS — E78.2 MIXED HYPERLIPIDEMIA: ICD-10-CM

## 2022-07-05 RX ORDER — ROSUVASTATIN CALCIUM 20 MG/1
TABLET, FILM COATED ORAL
Qty: 38 TABLET | Refills: 0 | Status: SHIPPED | OUTPATIENT
Start: 2022-07-05 | End: 2022-07-29 | Stop reason: SDUPTHER

## 2022-07-19 DIAGNOSIS — F41.9 ANXIETY DISORDER, UNSPECIFIED TYPE: ICD-10-CM

## 2022-07-19 RX ORDER — LORAZEPAM 0.5 MG/1
0.5 TABLET ORAL
Qty: 15 TABLET | Refills: 0 | Status: SHIPPED | OUTPATIENT
Start: 2022-07-19 | End: 2022-07-29 | Stop reason: SDUPTHER

## 2022-07-19 RX ORDER — LORAZEPAM 0.5 MG/1
TABLET ORAL
Qty: 15 TABLET | Refills: 0 | Status: SHIPPED | OUTPATIENT
Start: 2022-07-19 | End: 2022-07-19 | Stop reason: SDUPTHER

## 2022-07-25 DIAGNOSIS — R73.01 IMPAIRED FASTING GLUCOSE: Primary | ICD-10-CM

## 2022-07-25 DIAGNOSIS — E78.5 HYPERLIPIDEMIA, UNSPECIFIED HYPERLIPIDEMIA TYPE: ICD-10-CM

## 2022-07-25 DIAGNOSIS — E03.9 HYPOTHYROIDISM, UNSPECIFIED TYPE: ICD-10-CM

## 2022-07-29 ENCOUNTER — TELEMEDICINE (OUTPATIENT)
Dept: FAMILY MEDICINE CLINIC | Facility: CLINIC | Age: 67
End: 2022-07-29
Payer: COMMERCIAL

## 2022-07-29 VITALS
SYSTOLIC BLOOD PRESSURE: 106 MMHG | BODY MASS INDEX: 23.9 KG/M2 | HEART RATE: 54 BPM | HEIGHT: 64 IN | DIASTOLIC BLOOD PRESSURE: 64 MMHG | WEIGHT: 140 LBS

## 2022-07-29 DIAGNOSIS — I25.10 ARTERIOSCLEROSIS OF CORONARY ARTERY: ICD-10-CM

## 2022-07-29 DIAGNOSIS — K21.9 GERD WITHOUT ESOPHAGITIS: ICD-10-CM

## 2022-07-29 DIAGNOSIS — F41.9 ANXIETY DISORDER, UNSPECIFIED TYPE: ICD-10-CM

## 2022-07-29 DIAGNOSIS — Z78.0 POST-MENOPAUSE: ICD-10-CM

## 2022-07-29 DIAGNOSIS — Z12.4 ENCOUNTER FOR PAPANICOLAOU SMEAR FOR CERVICAL CANCER SCREENING: ICD-10-CM

## 2022-07-29 DIAGNOSIS — Z12.11 ENCOUNTER FOR SCREENING COLONOSCOPY: Primary | ICD-10-CM

## 2022-07-29 DIAGNOSIS — E78.2 MIXED HYPERLIPIDEMIA: ICD-10-CM

## 2022-07-29 DIAGNOSIS — E03.9 HYPOTHYROIDISM, UNSPECIFIED TYPE: ICD-10-CM

## 2022-07-29 PROCEDURE — 99214 OFFICE O/P EST MOD 30 MIN: CPT | Performed by: FAMILY MEDICINE

## 2022-07-29 RX ORDER — ROSUVASTATIN CALCIUM 20 MG/1
TABLET, FILM COATED ORAL
Qty: 38 TABLET | Refills: 5 | Status: SHIPPED | OUTPATIENT
Start: 2022-07-29 | End: 2022-08-01 | Stop reason: SDUPTHER

## 2022-07-29 RX ORDER — LORAZEPAM 0.5 MG/1
0.5 TABLET ORAL
Qty: 30 TABLET | Refills: 2 | Status: SHIPPED | OUTPATIENT
Start: 2022-07-29 | End: 2022-10-27 | Stop reason: SDUPTHER

## 2022-07-29 NOTE — PROGRESS NOTES
Virtual Regular Visit    Verification of patient location:    Patient is located in the following state in which I hold an active license [place]      Assessment/Plan:    Problem List Items Addressed This Visit        Digestive    GERD without esophagitis     Currently symptoms are quiescent            Endocrine    Hypothyroidism     She appears euthyroid  She is going to go for a TSH and we will follow up when results are available  Cardiovascular and Mediastinum    Arteriosclerosis of coronary artery     She is currently asymptomatic  She is going to continue with her aspirin, atenolol as well as Crestor  She does have sublingual nitroglycerin to use as needed  Other    Hyperlipidemia     Continue with Crestor  She does take an unusual doses which had been worked out for her by previous physician  This requires a yearly prior authorization  This was recently obtained for the coming year  She will get a lipid profile and follow up with her when results are available  Relevant Medications    Crestor 20 MG tablet    Anxiety disorder     She continues to use lorazepam at bedtime as needed for anxiety/insomnia         Relevant Medications    LORazepam (ATIVAN) 0 5 mg tablet      Other Visit Diagnoses     Encounter for screening colonoscopy    -  Primary    Relevant Orders    Ambulatory referral for colonoscopy    Encounter for Papanicolaou smear for cervical cancer screening        Relevant Orders    Ambulatory Referral to Gynecology    Post-menopause        Relevant Orders    DXA bone density spine hip and pelvis               Reason for visit is   Chief Complaint   Patient presents with    Follow-up     Med check    Virtual Regular Visit        Encounter provider Sandrine Duenas MD    Provider located at [address] [address]      Recent Visits  No visits were found meeting these conditions    Showing recent visits within past 7 days and meeting all other requirements  Today's Visits  Date Type Provider Dept   07/29/22 Telemedicine MD Hernandez Randall   Showing today's visits and meeting all other requirements  Future Appointments  No visits were found meeting these conditions  Showing future appointments within next 150 days and meeting all other requirements       The patient was identified by name and date of birth  Victor Manuel Feng was informed that this is a telemedicine visit and that the visit is being conducted through [address] [facility] and patient was informed that this is a secure, HIPAA-compliant platform  She agrees to proceed     My office door was closed  No one else was in the room  She acknowledged consent and understanding of privacy and security of the video platform  The patient has agreed to participate and understands they can discontinue the visit at any time  Patient is aware this is a billable service  Subjective  Victor Manuel Feng is a 78 y o  female  HPI     The patient is a 78-year-old female who presents today for routine follow-up of multiple medical problems which include a history of coronary atherosclerosis, hypothyroidism, hyperlipidemia, anxiety among other  She has been compliant with her medications which include aspirin, atenolol, Crestor, lorazepam as well as Synthroid  She denies any cardiovascular pulmonary complaint  No GI or  complaint  Overall she states she is feeling fairly well      Past Medical History:   Diagnosis Date    Anxiety     Coronary artery disease     Disease of thyroid gland     Ganglion cyst of wrist, right     last assessed nov 4 2016    History of Lyme disease     Presence of stent in LAD coronary artery     last assessed april 14 2017    Serum total bilirubin elevated 12/11/2020    Status post de Quervain's release surgery     last assessed dec 16 2016       Past Surgical History:   Procedure Laterality Date    BREAST EXCISIONAL BIOPSY Left 1996    benign    CORONARY ANGIOPLASTY WITH STENT PLACEMENT      GANGLION CYST EXCISION Right 12/8/2016    Procedure: EXCISION GANGLION CYST right first dorsal extensor compartment;  Surgeon: Estelle Gray MD;  [facility];  Service:     IL HALLUX RIGIDUS W/CHEILECTOMY 1ST MP JT W/O IMPLT Right 12/14/2021    Procedure: right index finger MUCOID CYST excision WITH CHEILECTOMY;  Surgeon: Estelle Gray MD;  [facility];  Service: Orthopedics    IL INCIS TENDON SHEATH,RADIAL STYLOID Right 12/8/2016    Procedure: Clar[place] End;  Surgeon: Estelle Gray MD;  [facility];  Service: Orthopedics    TONSILLECTOMY         Current Outpatient Medications   Medication Sig Dispense Refill    aspirin 81 MG tablet Take 81 mg by mouth daily      atenolol (TENORMIN) 25 mg tablet Take 1 5 tablets (37 5 mg total) by mouth daily 135 tablet 1    Biotin 10 MG CAPS       cholecalciferol (VITAMIN D3) 1,000 units tablet Take by mouth      Crestor 20 MG tablet take 1 tablet by mouth once daily for 5 days then take 2 tablets for 2 days 38 tablet 5    Cyanocobalamin (Vitamin B 12) 500 MCG TABS       LORazepam (ATIVAN) 0 5 mg tablet Take 1 tablet (0 5 mg total) by mouth daily at bedtime as needed for anxiety 30 tablet 2    Lutein 10 MG TABS 40 mg       Omega-3 Fatty Acids (fish oil) 1,000 mg Take 1,100 mg by mouth daily      Synthroid 75 MCG tablet Take 1 tablet (75 mcg total) by mouth daily 90 tablet 1    Acetylcysteine (NAC PO) Take by mouth 2 (two) times a day      nitroglycerin (Nitrostat) 0 4 mg SL tablet Place 1 tablet (0 4 mg total) under the tongue every 5 (five) minutes as needed for chest pain 30 tablet 0     No current facility-administered medications for this visit  Allergies   Allergen Reactions    Tetracaine      Other reaction(s): Other (See Comments)  Eye Irritation, Burning  Other reaction(s):  Other (See Comments)  Eye Irritation, Burning    Benzalkonium Chloride Other (See Comments)     Eye redness and pain       Review of Systems  Per the HPI, otherwise negative  Video Exam    Vitals:    07/29/22 1433   BP: 106/64   Pulse: (!) 54   Weight: 63 5 kg (140 lb)   Height: 5' 4" (1 626 m)       Physical Exam  Vitals and nursing note reviewed  Constitutional:       Appearance: Normal appearance  She is not ill-appearing  Pulmonary:      Effort: Pulmonary effort is normal  No respiratory distress  Neurological:      Mental Status: She is alert and oriented to person, place, and time  Psychiatric:         Mood and Affect: Mood normal          Thought Content: Thought content normal          Judgment: Judgment normal           I spent 20 minutes directly with the patient during this visit    Robby Marco verbally agrees to participate in [employer]  Pt is aware that [employer] could be limited without vital signs or the ability to perform a full hands-on physical exam  Shakira Trejo understands she or the provider may request at any time to terminate the video visit and request the patient to seek care or treatment in person

## 2022-07-29 NOTE — ASSESSMENT & PLAN NOTE
She is currently asymptomatic  She is going to continue with her aspirin, atenolol as well as Crestor  She does have sublingual nitroglycerin to use as needed

## 2022-07-29 NOTE — ASSESSMENT & PLAN NOTE
She appears euthyroid  She is going to go for a TSH and we will follow up when results are available

## 2022-07-29 NOTE — ASSESSMENT & PLAN NOTE
Continue with Crestor  She does take an unusual doses which had been worked out for her by previous physician  This requires a yearly prior authorization  This was recently obtained for the coming year  She will get a lipid profile and follow up with her when results are available

## 2022-08-01 DIAGNOSIS — E78.2 MIXED HYPERLIPIDEMIA: ICD-10-CM

## 2022-08-01 DIAGNOSIS — I25.10 ARTERIOSCLEROSIS OF CORONARY ARTERY: ICD-10-CM

## 2022-08-01 RX ORDER — ATENOLOL 25 MG/1
TABLET ORAL
Qty: 135 TABLET | Refills: 3 | Status: SHIPPED | OUTPATIENT
Start: 2022-08-01 | End: 2022-10-27 | Stop reason: SDUPTHER

## 2022-08-01 RX ORDER — ROSUVASTATIN CALCIUM 20 MG/1
TABLET, FILM COATED ORAL
Qty: 38 TABLET | Refills: 5 | Status: SHIPPED | OUTPATIENT
Start: 2022-08-01 | End: 2022-09-27

## 2022-08-23 ENCOUNTER — PATIENT MESSAGE (OUTPATIENT)
Dept: FAMILY MEDICINE CLINIC | Facility: CLINIC | Age: 67
End: 2022-08-23

## 2022-08-29 DIAGNOSIS — E03.9 HYPOTHYROIDISM, UNSPECIFIED TYPE: ICD-10-CM

## 2022-08-29 RX ORDER — LEVOTHYROXINE SODIUM 75 MCG
75 TABLET ORAL DAILY
Qty: 90 TABLET | Refills: 1 | Status: SHIPPED | OUTPATIENT
Start: 2022-08-29 | End: 2022-10-27 | Stop reason: SDUPTHER

## 2022-09-09 DIAGNOSIS — I25.10 ARTERIOSCLEROSIS OF CORONARY ARTERY: ICD-10-CM

## 2022-09-09 RX ORDER — NITROGLYCERIN 0.4 MG/1
0.4 TABLET SUBLINGUAL
Qty: 30 TABLET | Refills: 0 | Status: SHIPPED | OUTPATIENT
Start: 2022-09-09

## 2022-10-27 ENCOUNTER — OFFICE VISIT (OUTPATIENT)
Dept: FAMILY MEDICINE CLINIC | Facility: CLINIC | Age: 67
End: 2022-10-27

## 2022-10-27 VITALS
DIASTOLIC BLOOD PRESSURE: 68 MMHG | HEIGHT: 64 IN | BODY MASS INDEX: 26.8 KG/M2 | SYSTOLIC BLOOD PRESSURE: 144 MMHG | WEIGHT: 157 LBS | TEMPERATURE: 98.6 F | OXYGEN SATURATION: 99 % | HEART RATE: 77 BPM

## 2022-10-27 DIAGNOSIS — R73.02 IMPAIRED GLUCOSE TOLERANCE: ICD-10-CM

## 2022-10-27 DIAGNOSIS — Z78.0 POST-MENOPAUSE: ICD-10-CM

## 2022-10-27 DIAGNOSIS — Z12.11 ENCOUNTER FOR SCREENING COLONOSCOPY: ICD-10-CM

## 2022-10-27 DIAGNOSIS — Z23 NEED FOR VACCINATION: ICD-10-CM

## 2022-10-27 DIAGNOSIS — Z00.00 ANNUAL PHYSICAL EXAM: ICD-10-CM

## 2022-10-27 DIAGNOSIS — E78.2 MIXED HYPERLIPIDEMIA: ICD-10-CM

## 2022-10-27 DIAGNOSIS — E03.9 HYPOTHYROIDISM, UNSPECIFIED TYPE: Primary | ICD-10-CM

## 2022-10-27 DIAGNOSIS — D50.9 IRON DEFICIENCY ANEMIA, UNSPECIFIED IRON DEFICIENCY ANEMIA TYPE: ICD-10-CM

## 2022-10-27 DIAGNOSIS — Z12.31 SCREENING MAMMOGRAM FOR BREAST CANCER: ICD-10-CM

## 2022-10-27 DIAGNOSIS — I25.10 ARTERIOSCLEROSIS OF CORONARY ARTERY: ICD-10-CM

## 2022-10-27 DIAGNOSIS — F41.9 ANXIETY DISORDER, UNSPECIFIED TYPE: ICD-10-CM

## 2022-10-27 RX ORDER — EZETIMIBE 10 MG/1
10 TABLET ORAL DAILY
COMMUNITY
Start: 2022-10-25 | End: 2023-05-19 | Stop reason: SINTOL

## 2022-10-27 RX ORDER — LORAZEPAM 0.5 MG/1
0.5 TABLET ORAL
Qty: 30 TABLET | Refills: 0 | Status: SHIPPED | OUTPATIENT
Start: 2022-10-27

## 2022-10-27 RX ORDER — ROSUVASTATIN CALCIUM 20 MG/1
TABLET, FILM COATED ORAL
Qty: 38 TABLET | Refills: 4 | Status: SHIPPED | OUTPATIENT
Start: 2022-10-27

## 2022-10-27 RX ORDER — LEVOTHYROXINE SODIUM 75 MCG
75 TABLET ORAL DAILY
Qty: 90 TABLET | Refills: 3 | Status: SHIPPED | OUTPATIENT
Start: 2022-10-27

## 2022-10-27 RX ORDER — ATENOLOL 25 MG/1
37.5 TABLET ORAL DAILY
Qty: 135 TABLET | Refills: 3 | Status: SHIPPED | OUTPATIENT
Start: 2022-10-27

## 2022-10-27 NOTE — ASSESSMENT & PLAN NOTE
Dr Olivia Henry- lipid specialist- started her on zetia will repeat labs in 3 months  Continue crestor

## 2022-10-27 NOTE — PATIENT INSTRUCTIONS

## 2022-10-27 NOTE — PROGRESS NOTES
[address] [place] [address] PRACTICE    NAME: Yen Bailey  AGE: 78 y o  SEX: female  : 1955     DATE: 10/29/2022     Assessment and Plan:     Problem List Items Addressed This Visit        Endocrine    Hypothyroidism - Primary     Stable, continue on synthroid 75mcg         Relevant Medications    atenolol (TENORMIN) 25 mg tablet    Synthroid 75 MCG tablet    Other Relevant Orders    TSH, 3rd generation with Free T4 reflex       Cardiovascular and Mediastinum    Arteriosclerosis of coronary artery     Had LAD stent- 13 years ago  No recurrent chest pain  Continue follow up with cardiology and now lipid specialist           Relevant Medications    atenolol (TENORMIN) 25 mg tablet       Other    Anxiety disorder     Stable with prn use of ativan           Relevant Medications    LORazepam (ATIVAN) 0 5 mg tablet    Hyperlipidemia     Dr Queen Moreland- lipid specialist- started her on zetia will repeat labs in 3 months  Continue crestor         Relevant Medications    Crestor 20 MG tablet    Other Relevant Orders    Lipid panel    Comprehensive metabolic panel    Iron deficiency anemia     Check labs         Relevant Orders    CBC and differential    Iron    Ferritin    TIBC      Other Visit Diagnoses     Impaired glucose tolerance        Relevant Orders    Comprehensive metabolic panel    HEMOGLOBIN A1C W/ EAG ESTIMATION    Insulin, fasting    Need for vaccination        Relevant Orders    Pneumococcal Conjugate Vaccine 20-valent (Pcv20) (Completed)    Screening mammogram for breast cancer        Relevant Orders    Mammo screening bilateral w 3d & cad    Post-menopause        Relevant Orders    DXA bone density spine hip and pelvis    Encounter for screening colonoscopy        Relevant Orders    Ambulatory referral for colonoscopy    Annual physical exam              Immunizations and preventive care screenings were discussed with patient today   Appropriate education was printed on patient's after visit summary  Counseling:  Alcohol/drug use: discussed moderation in alcohol intake, the recommendations for healthy alcohol use, and avoidance of illicit drug use  Dental Health: discussed importance of regular tooth brushing, flossing, and dental visits  Injury prevention: discussed safety/seat belts, safety helmets, smoke detectors, carbon dioxide detectors, and smoking near bedding or upholstery  Sexual health: discussed sexually transmitted diseases, partner selection, use of condoms, avoidance of unintended pregnancy, and contraceptive alternatives  · Exercise: the importance of regular exercise/physical activity was discussed  Recommend exercise 3-5 times per week for at least 30 minutes  No follow-ups on file  Chief Complaint:     Chief Complaint   Patient presents with   • Physical Exam     Transfer from Dr Hannah Blair      History of Present Illness:     Adult Annual Physical   Patient here for a comprehensive physical exam  The patient reports no problems  Diet and Physical Activity  · Diet/Nutrition: well balanced diet  · Exercise: walking  Depression Screening  PHQ-2/9 Depression Screening    Little interest or pleasure in doing things: 0 - not at all  Feeling down, depressed, or hopeless: 0 - not at all  PHQ-2 Score: 0  PHQ-2 Interpretation: Negative depression screen       General Health  · Sleep: sleeps well  · Hearing: normal - bilateral   · Vision: goes for regular eye exams  · Dental: regular dental visits  /GYN Health  · Patient is: postmenopausal       Review of Systems:     Review of Systems   Constitutional: Negative  Negative for chills and fever  HENT: Negative  Negative for ear pain and sore throat  Eyes: Negative  Negative for pain and visual disturbance  Respiratory: Negative  Negative for cough and shortness of breath  Cardiovascular: Negative for chest pain and palpitations     Gastrointestinal: Negative  Negative for abdominal pain and vomiting  Endocrine: Negative  Genitourinary: Negative  Negative for dysuria and hematuria  Musculoskeletal: Negative  Negative for arthralgias and back pain  Skin: Negative  Negative for color change and rash  Neurological: Negative  Negative for seizures and syncope  Psychiatric/Behavioral: Negative  All other systems reviewed and are negative       Past Medical History:     Past Medical History:   Diagnosis Date   • Allergic    • Anemia    • Anxiety    • Coronary artery disease    • Disease of thyroid gland    • Ganglion cyst of wrist, right     last assessed nov 4 2016   • GERD (gastroesophageal reflux disease)    • History of Lyme disease    • Pneumonia    • Presence of stent in LAD coronary artery     last assessed april 14 2017   • Serum total bilirubin elevated 12/11/2020   • Status post de Quervain's release surgery     last assessed dec 16 2016   • Visual impairment       Past Surgical History:     Past Surgical History:   Procedure Laterality Date   • BREAST EXCISIONAL BIOPSY Left 1996    benign   • BREAST SURGERY     • CORONARY ANGIOPLASTY WITH STENT PLACEMENT     • GANGLION CYST EXCISION Right 12/08/2016    Procedure: EXCISION GANGLION CYST right first dorsal extensor compartment;  Surgeon: Leobardo Yates MD;  Location: [facility];  Service:    • AR HALLUX RIGIDUS W/CHEILECTOMY 1ST MP JT W/O IMPLT Right 12/14/2021    Procedure: right index finger MUCOID CYST excision WITH CHEILECTOMY;  Surgeon: Leobardo Yates MD;  Location: [facility];  Service: Orthopedics   • AR INCIS TENDON SHEATH,RADIAL STYLOID Right 12/08/2016    Procedure: Garland Man;  Surgeon: Leobardo Yates MD;  Location: [facility];  Service: Orthopedics   • TONSILLECTOMY        Social History:     Social History     Socioeconomic History   • Marital status: /Civil Union     Spouse name: None   • Number of children: None   • Years of education: None   • Highest education level: None   Occupational History   • None   Tobacco Use   • Smoking status: Never Smoker   • Smokeless tobacco: Never Used   • Tobacco comment: Never have inhaled any type of tobacco or anything else   Vaping Use   • Vaping Use: Never used   Substance and Sexual Activity   • Alcohol use: No   • Drug use: Never   • Sexual activity: Not Currently     Partners: Female     Birth control/protection: Post-menopausal     Comment: Never have taken any type of birth control medication   Other Topics Concern   • None   Social History Narrative   • None     Social Determinants of Health     Financial Resource Strain: Not on file   Food Insecurity: Not on file   Transportation Needs: Not on file   Physical Activity: Not on file   Stress: Not on file   Social Connections: Not on file   Intimate Partner Violence: Not on file   Housing Stability: Not on file      Family History:     Family History   Problem Relation Age of Onset   • Heart disease Father    • Cancer Father    • Dementia Mother    • Hyperlipidemia Sister    • Thyroid disease Sister    • Stroke Brother         Mini stroke   • Heart failure Maternal Grandmother    • No Known Problems Maternal Grandfather    • No Known Problems Paternal Grandmother    • No Known Problems Paternal Grandfather    • Breast cancer Paternal Aunt         unknown primary with mets to breast-unknown age   • Cancer Paternal Aunt 76        hip-bone   • No Known Problems Daughter    • No Known Problems Daughter    • No Known Problems Daughter    • Anxiety disorder Sister    • COPD Sister         Smoker since the age of 12   • No Known Problems Maternal Aunt    • No Known Problems Maternal Aunt    • No Known Problems Maternal Aunt    • No Known Problems Maternal Aunt    • Lung cancer Paternal Aunt         heavy smoker-unknown age      Current Medications:     Current Outpatient Medications   Medication Sig Dispense Refill   • Acetylcysteine (NAC PO) Take by mouth 2 (two) times a day     • aspirin 81 MG tablet Take 81 mg by mouth daily     • atenolol (TENORMIN) 25 mg tablet Take 1 5 tablets (37 5 mg total) by mouth daily 135 tablet 3   • Biotin 10 MG CAPS      • cholecalciferol (VITAMIN D3) 1,000 units tablet Take by mouth     • Crestor 20 MG tablet Take 1 tablet by mouth once daily for 5 days, then take 2 tablets for 2 days 38 tablet 4   • Cyanocobalamin (Vitamin B 12) 500 MCG TABS      • ezetimibe (ZETIA) 10 mg tablet Take 10 mg by mouth daily     • LORazepam (ATIVAN) 0 5 mg tablet Take 1 tablet (0 5 mg total) by mouth daily at bedtime as needed for anxiety 30 tablet 0   • Lutein 10 MG TABS 40 mg      • nitroglycerin (Nitrostat) 0 4 mg SL tablet Place 1 tablet (0 4 mg total) under the tongue every 5 (five) minutes as needed for chest pain 30 tablet 0   • Omega-3 Fatty Acids (fish oil) 1,000 mg Take 1,100 mg by mouth daily     • Synthroid 75 MCG tablet Take 1 tablet (75 mcg total) by mouth daily 90 tablet 3     No current facility-administered medications for this visit  Allergies: Allergies   Allergen Reactions   • Tetracaine      Other reaction(s): Other (See Comments)  Eye Irritation, Burning  Other reaction(s): Other (See Comments)  Eye Irritation, Burning   • Benzalkonium Chloride Other (See Comments)     Eye redness and pain      Physical Exam:     /68 (BP Location: Left arm, Patient Position: Sitting, Cuff Size: Standard)   Pulse 77   Temp 98 6 °F (37 °C) (Tympanic)   Ht 5' 4" (1 626 m)   Wt 71 2 kg (157 lb)   SpO2 99%   BMI 26 95 kg/m²     Physical Exam  Vitals and nursing note reviewed  Constitutional:       General: She is not in acute distress  Appearance: Normal appearance  She is well-developed  HENT:      Head: Normocephalic and atraumatic        Right Ear: Tympanic membrane, ear canal and external ear normal       Left Ear: Tympanic membrane, ear canal and external ear normal       Nose: Nose normal       Mouth/Throat:      Mouth: Mucous membranes are moist       Pharynx: Oropharynx is clear  Eyes:      Conjunctiva/sclera: Conjunctivae normal       Pupils: Pupils are equal, round, and reactive to light  Cardiovascular:      Rate and Rhythm: Normal rate and regular rhythm  Heart sounds: Normal heart sounds  No murmur heard  Pulmonary:      Effort: Pulmonary effort is normal  No respiratory distress  Breath sounds: Normal breath sounds  Abdominal:      Palpations: Abdomen is soft  Tenderness: There is no abdominal tenderness  Musculoskeletal:      Cervical back: Neck supple  Skin:     General: Skin is warm and dry  Neurological:      Mental Status: She is alert and oriented to person, place, and time  Psychiatric:         Mood and Affect: Mood normal          Behavior: Behavior normal          Thought Content:  Thought content normal          Judgment: Judgment normal           Rosalina Puga, [address] [facility]

## 2022-10-27 NOTE — ASSESSMENT & PLAN NOTE
Had LAD stent- 13 years ago  No recurrent chest pain  Continue follow up with cardiology and now lipid specialist

## 2022-11-04 ENCOUNTER — APPOINTMENT (OUTPATIENT)
Dept: LAB | Facility: CLINIC | Age: 67
End: 2022-11-04

## 2022-11-04 DIAGNOSIS — E78.2 MIXED HYPERLIPIDEMIA: ICD-10-CM

## 2022-11-04 DIAGNOSIS — D50.9 IRON DEFICIENCY ANEMIA, UNSPECIFIED IRON DEFICIENCY ANEMIA TYPE: ICD-10-CM

## 2022-11-04 DIAGNOSIS — E78.5 HYPERLIPIDEMIA, UNSPECIFIED HYPERLIPIDEMIA TYPE: Primary | ICD-10-CM

## 2022-11-04 DIAGNOSIS — R73.02 IMPAIRED GLUCOSE TOLERANCE: ICD-10-CM

## 2022-11-04 DIAGNOSIS — I25.10 ARTERIOSCLEROSIS OF CORONARY ARTERY: ICD-10-CM

## 2022-11-04 DIAGNOSIS — E03.9 HYPOTHYROIDISM, UNSPECIFIED TYPE: ICD-10-CM

## 2022-11-04 LAB
ALBUMIN SERPL BCP-MCNC: 4.1 G/DL (ref 3.5–5)
ALP SERPL-CCNC: 52 U/L (ref 34–104)
ALT SERPL W P-5'-P-CCNC: 16 U/L (ref 7–52)
ANION GAP SERPL CALCULATED.3IONS-SCNC: 5 MMOL/L (ref 4–13)
AST SERPL W P-5'-P-CCNC: 20 U/L (ref 13–39)
BASOPHILS # BLD AUTO: 0.06 THOUSANDS/ÂΜL (ref 0–0.1)
BASOPHILS NFR BLD AUTO: 1 % (ref 0–1)
BILIRUB SERPL-MCNC: 0.87 MG/DL (ref 0.2–1)
BUN SERPL-MCNC: 19 MG/DL (ref 5–25)
CALCIUM SERPL-MCNC: 9.6 MG/DL (ref 8.4–10.2)
CHLORIDE SERPL-SCNC: 104 MMOL/L (ref 96–108)
CHOLEST SERPL-MCNC: 117 MG/DL
CO2 SERPL-SCNC: 29 MMOL/L (ref 21–32)
CREAT SERPL-MCNC: 0.75 MG/DL (ref 0.6–1.3)
EOSINOPHIL # BLD AUTO: 0.17 THOUSAND/ÂΜL (ref 0–0.61)
EOSINOPHIL NFR BLD AUTO: 3 % (ref 0–6)
ERYTHROCYTE [DISTWIDTH] IN BLOOD BY AUTOMATED COUNT: 13.1 % (ref 11.6–15.1)
EST. AVERAGE GLUCOSE BLD GHB EST-MCNC: 111 MG/DL
FERRITIN SERPL-MCNC: 93 NG/ML (ref 8–388)
GFR SERPL CREATININE-BSD FRML MDRD: 82 ML/MIN/1.73SQ M
GLUCOSE P FAST SERPL-MCNC: 87 MG/DL (ref 65–99)
HBA1C MFR BLD: 5.5 %
HCT VFR BLD AUTO: 43.8 % (ref 34.8–46.1)
HDLC SERPL-MCNC: 57 MG/DL
HGB BLD-MCNC: 14.1 G/DL (ref 11.5–15.4)
IMM GRANULOCYTES # BLD AUTO: 0.01 THOUSAND/UL (ref 0–0.2)
IMM GRANULOCYTES NFR BLD AUTO: 0 % (ref 0–2)
INSULIN SERPL-ACNC: 2.7 MU/L (ref 3–25)
IRON SERPL-MCNC: 80 UG/DL (ref 50–170)
LDLC SERPL CALC-MCNC: 49 MG/DL (ref 0–100)
LYMPHOCYTES # BLD AUTO: 2.66 THOUSANDS/ÂΜL (ref 0.6–4.47)
LYMPHOCYTES NFR BLD AUTO: 45 % (ref 14–44)
MCH RBC QN AUTO: 28.1 PG (ref 26.8–34.3)
MCHC RBC AUTO-ENTMCNC: 32.2 G/DL (ref 31.4–37.4)
MCV RBC AUTO: 87 FL (ref 82–98)
MONOCYTES # BLD AUTO: 0.51 THOUSAND/ÂΜL (ref 0.17–1.22)
MONOCYTES NFR BLD AUTO: 9 % (ref 4–12)
NEUTROPHILS # BLD AUTO: 2.46 THOUSANDS/ÂΜL (ref 1.85–7.62)
NEUTS SEG NFR BLD AUTO: 42 % (ref 43–75)
NONHDLC SERPL-MCNC: 60 MG/DL
NRBC BLD AUTO-RTO: 0 /100 WBCS
PLATELET # BLD AUTO: 238 THOUSANDS/UL (ref 149–390)
PMV BLD AUTO: 10.2 FL (ref 8.9–12.7)
POTASSIUM SERPL-SCNC: 4.4 MMOL/L (ref 3.5–5.3)
PROT SERPL-MCNC: 6.9 G/DL (ref 6.4–8.4)
RBC # BLD AUTO: 5.01 MILLION/UL (ref 3.81–5.12)
SODIUM SERPL-SCNC: 138 MMOL/L (ref 135–147)
TIBC SERPL-MCNC: 285 UG/DL (ref 250–450)
TRIGL SERPL-MCNC: 57 MG/DL
TSH SERPL DL<=0.05 MIU/L-ACNC: 2.53 UIU/ML (ref 0.45–4.5)
WBC # BLD AUTO: 5.87 THOUSAND/UL (ref 4.31–10.16)

## 2022-12-20 ENCOUNTER — HOSPITAL ENCOUNTER (OUTPATIENT)
Dept: BONE DENSITY | Facility: IMAGING CENTER | Age: 67
Discharge: HOME | End: 2022-12-20

## 2022-12-20 VITALS — WEIGHT: 165 LBS | BODY MASS INDEX: 29.23 KG/M2 | HEIGHT: 63 IN

## 2022-12-20 DIAGNOSIS — Z78.0 POST-MENOPAUSE: ICD-10-CM

## 2022-12-20 NOTE — RESULT ENCOUNTER NOTE
Your bone density scan shows osteopenia in your lumbar spine, hip and pelvis  Recommend calcium with vitamin D supplements as well as weight bearing exercises  Walking 30 minutes five times weekly  We can recheck in 2 years for improvement or worsening

## 2023-02-22 DIAGNOSIS — F41.9 ANXIETY DISORDER, UNSPECIFIED TYPE: ICD-10-CM

## 2023-02-23 RX ORDER — LORAZEPAM 0.5 MG/1
TABLET ORAL
Qty: 30 TABLET | Refills: 1 | Status: SHIPPED | OUTPATIENT
Start: 2023-02-23

## 2023-03-10 ENCOUNTER — HOSPITAL ENCOUNTER (OUTPATIENT)
Dept: MAMMOGRAPHY | Facility: CLINIC | Age: 68
Discharge: HOME | End: 2023-03-10

## 2023-03-10 VITALS — WEIGHT: 164.9 LBS | BODY MASS INDEX: 29.22 KG/M2 | HEIGHT: 63 IN

## 2023-03-10 DIAGNOSIS — Z12.31 SCREENING MAMMOGRAM FOR BREAST CANCER: ICD-10-CM

## 2023-03-13 ENCOUNTER — TELEPHONE (OUTPATIENT)
Dept: FAMILY MEDICINE CLINIC | Facility: CLINIC | Age: 68
End: 2023-03-13

## 2023-03-13 NOTE — TELEPHONE ENCOUNTER
----- Message from Albert Ocasio sent at 3/13/2023 10:10 AM EDT -----  Your mammogram was normal   Repeat in 1 year is recommended

## 2023-03-13 NOTE — TELEPHONE ENCOUNTER
Results -    I left a VM advising pt to call the office for test results  [employer] (ML4D) message also sent

## 2023-04-26 DIAGNOSIS — F41.9 ANXIETY DISORDER, UNSPECIFIED TYPE: ICD-10-CM

## 2023-04-27 RX ORDER — LORAZEPAM 0.5 MG/1
TABLET ORAL
Qty: 30 TABLET | Refills: 0 | Status: SHIPPED | OUTPATIENT
Start: 2023-04-27

## 2023-05-10 ENCOUNTER — TELEPHONE (OUTPATIENT)
Dept: FAMILY MEDICINE CLINIC | Facility: CLINIC | Age: 68
End: 2023-05-10

## 2023-05-10 DIAGNOSIS — E78.2 MIXED HYPERLIPIDEMIA: ICD-10-CM

## 2023-05-10 RX ORDER — ROSUVASTATIN CALCIUM 20 MG/1
20 TABLET, FILM COATED ORAL DAILY
Qty: 32 TABLET | Refills: 1 | Status: SHIPPED | OUTPATIENT
Start: 2023-05-10 | End: 2023-05-19 | Stop reason: SDUPTHER

## 2023-05-10 RX ORDER — ROSUVASTATIN CALCIUM 20 MG/1
20 TABLET, FILM COATED ORAL DAILY
Qty: 30 TABLET | Refills: 1 | Status: SHIPPED | OUTPATIENT
Start: 2023-05-10 | End: 2023-05-10 | Stop reason: SDUPTHER

## 2023-05-10 RX ORDER — ROSUVASTATIN CALCIUM 20 MG/1
TABLET, FILM COATED ORAL
Qty: 38 TABLET | Refills: 0 | Status: SHIPPED | OUTPATIENT
Start: 2023-05-10 | End: 2023-05-10 | Stop reason: SDUPTHER

## 2023-05-10 RX ORDER — ROSUVASTATIN CALCIUM 20 MG/1
20 TABLET, FILM COATED ORAL DAILY
Qty: 32 TABLET | Refills: 1 | Status: CANCELLED | OUTPATIENT
Start: 2023-05-10

## 2023-05-10 NOTE — TELEPHONE ENCOUNTER
Pt call and say the medication you send to her pharmacy is wrong is says 38 tablets it supposed to be 30 tabs and pharmacy will not filled it   She has to take the brand name     CRETOR 20mg        [id number]     Summer asencio

## 2023-05-10 NOTE — TELEPHONE ENCOUNTER
[facility] pharmacy called regarding crestor med refill  Asked if we could send Crestor 20 with 32 pills refill instead of 30, as this would save the patient a lot of money regarding insurance  Can we cancel the 30 tabs and change to 32 tabs?  The crestor refill with 32 tabs is pended

## 2023-05-10 NOTE — PROGRESS NOTES
[facility] pharmacy called regarding crestor med refill  Asked if we could send Crestor 20 with 32 pills refill instead of 30, as this would save the patient a lot of money regarding insurance  Cancel the 30 tabs and change to 32 tabs  See med refill

## 2023-05-12 NOTE — TELEPHONE ENCOUNTER
Pt called requesting crestor refill  Pt advised that crestor was just filled by Shruti Jones on 5/10/23 to [facility]

## 2023-05-18 ENCOUNTER — TELEPHONE (OUTPATIENT)
Dept: FAMILY MEDICINE CLINIC | Facility: CLINIC | Age: 68
End: 2023-05-18

## 2023-05-18 ENCOUNTER — APPOINTMENT (OUTPATIENT)
Dept: LAB | Facility: CLINIC | Age: 68
End: 2023-05-18

## 2023-05-18 DIAGNOSIS — I25.10 ARTERIOSCLEROSIS OF CORONARY ARTERY: ICD-10-CM

## 2023-05-18 DIAGNOSIS — E78.5 HYPERLIPIDEMIA, UNSPECIFIED HYPERLIPIDEMIA TYPE: ICD-10-CM

## 2023-05-18 NOTE — TELEPHONE ENCOUNTER
Patient's insurance will not cover Crestor but it will cover the following:    Drug Name   PA Requirement*    Crestor 20MG tablets  Required  Atorvastatin Calcium  Not Required  Rosuvastatin Calcium  Not Required  Simvastatin   Not Required    Do you want to change the prescription? If so please advise and I will notify patient

## 2023-05-19 ENCOUNTER — OFFICE VISIT (OUTPATIENT)
Dept: FAMILY MEDICINE CLINIC | Facility: CLINIC | Age: 68
End: 2023-05-19

## 2023-05-19 VITALS
HEIGHT: 64 IN | BODY MASS INDEX: 29.71 KG/M2 | HEART RATE: 75 BPM | WEIGHT: 174 LBS | OXYGEN SATURATION: 98 % | TEMPERATURE: 97.9 F

## 2023-05-19 DIAGNOSIS — M62.838 MUSCLE SPASM: ICD-10-CM

## 2023-05-19 DIAGNOSIS — E03.9 HYPOTHYROIDISM, UNSPECIFIED TYPE: ICD-10-CM

## 2023-05-19 DIAGNOSIS — E78.00 HYPERCHOLESTEROLEMIA: Primary | ICD-10-CM

## 2023-05-19 DIAGNOSIS — D50.9 IRON DEFICIENCY ANEMIA, UNSPECIFIED IRON DEFICIENCY ANEMIA TYPE: ICD-10-CM

## 2023-05-19 DIAGNOSIS — E78.2 MIXED HYPERLIPIDEMIA: ICD-10-CM

## 2023-05-19 DIAGNOSIS — I25.10 ARTERIOSCLEROSIS OF CORONARY ARTERY: ICD-10-CM

## 2023-05-19 DIAGNOSIS — F41.9 ANXIETY DISORDER, UNSPECIFIED TYPE: ICD-10-CM

## 2023-05-19 DIAGNOSIS — Z12.11 SCREEN FOR COLON CANCER: ICD-10-CM

## 2023-05-19 PROBLEM — Z95.5 HISTORY OF PLACEMENT OF STENT IN LAD CORONARY ARTERY: Status: ACTIVE | Noted: 2022-10-25

## 2023-05-19 PROBLEM — Z86.19 HISTORY OF LYME DISEASE: Status: ACTIVE | Noted: 2023-05-19

## 2023-05-19 PROBLEM — M67.40 MUCOID CYST OF JOINT: Status: RESOLVED | Noted: 2021-12-14 | Resolved: 2023-05-19

## 2023-05-19 RX ORDER — CYCLOBENZAPRINE HCL 10 MG
10 TABLET ORAL
Qty: 20 TABLET | Refills: 0 | Status: SHIPPED | OUTPATIENT
Start: 2023-05-19

## 2023-05-19 RX ORDER — ROSUVASTATIN CALCIUM 20 MG/1
20 TABLET, FILM COATED ORAL DAILY
Qty: 32 TABLET | Refills: 5 | Status: SHIPPED | OUTPATIENT
Start: 2023-05-19

## 2023-05-19 RX ORDER — LORAZEPAM 0.5 MG/1
0.5 TABLET ORAL
Qty: 30 TABLET | Refills: 0 | Status: SHIPPED | OUTPATIENT
Start: 2023-05-19

## 2023-05-19 NOTE — PATIENT INSTRUCTIONS
Pepcid as needed for reflux symptoms  Shingrix - check with insurance company to see if covered in office or at pharmacy  Ct coronary calcium score  Continue follow up with lipid specialist

## 2023-05-19 NOTE — PROGRESS NOTES
Assessment/Plan:      1  Hypercholesterolemia  -     Lipid Panel with Direct LDL reflex  -     Comprehensive metabolic panel; Future  -     CT coronary calcium score; Future; Expected date: 05/21/2023    2  Arteriosclerosis of coronary artery  Assessment & Plan:  Sees lipid specialist  Would like to get ct calcium score  Can only tolerate brand crestor  Orders:  -     CT coronary calcium score; Future; Expected date: 05/21/2023    3  Hypothyroidism, unspecified type  Assessment & Plan:  Reviewed blood work 11/2022  Continue Synthroid brand 75mcg daily     Orders:  -     TSH, 3rd generation with Free T4 reflex; Future    4  Muscle spasm  Assessment & Plan:  Low back pain appears more muscular  Pt will try cyclobenzaprine at bedtime 1/2-1 tab as needed  Should not drive with medication     Orders:  -     cyclobenzaprine (FLEXERIL) 10 mg tablet; Take 1 tablet (10 mg total) by mouth daily at bedtime as needed for muscle spasms    5  Anxiety disorder, unspecified type  Assessment & Plan:  Signed new medication agreement  pdmp reviewed  Orders:  -     LORazepam (ATIVAN) 0 5 mg tablet; Take 1 tablet (0 5 mg total) by mouth daily at bedtime    6  Screen for colon cancer  -     Ambulatory referral for colonoscopy; Future    7  Iron deficiency anemia, unspecified iron deficiency anemia type    8  Mixed hyperlipidemia  -     Crestor 20 MG tablet; Take 1 tablet (20 mg total) by mouth daily        Subjective:  Chief Complaint   Patient presents with   • Follow-up     Med check, for bolivar, want to get the shingles shot Crestro and you she only can take the Brand name been taken for 15th years     [id number]         Patient ID: Ismael Welch is a 75 y o  female  Pt is seen by me for the first time  She is a former patient of Dr Alana Booker  She does not get her blood pressure taken in the office  She states she has white coat high blood pressure    She took her blood pressure just before coming to the office and reports "113/62 blood pressure at home  [employer]ron blood pressure arm cuff  Had lad stent placed 15 year ago  She is intolerant to multiple statins including the generic of crestor with Leg weakness and leg cramps- same symptoms   Has been on crestor - brand with no problems  She also had the same problems with zetia - leg aches, headaches  She complains of Low back pain for 2 weeks   Synthroid gets delivered: phone number given to MA to renew  crestor and ativan [facility] [place]  Atenolol [facility]   She is doing ok today  Review of Systems   Constitutional: Negative  Negative for fatigue and fever  HENT: Negative  Eyes: Negative  Respiratory: Negative  Negative for cough  Cardiovascular: Negative  Gastrointestinal: Negative  Endocrine: Negative  Genitourinary: Negative  Musculoskeletal: Positive for back pain  Skin: Negative  Allergic/Immunologic: Negative  Neurological: Negative  Psychiatric/Behavioral: Negative  The following portions of the patient's history were reviewed and updated as appropriate: allergies, current medications, past family history, past medical history, past social history, past surgical history and problem list     Objective:  Vitals:    05/19/23 1456   Pulse: 75   Temp: 97 9 °F (36 6 °C)   TempSrc: Tympanic   SpO2: 98%   Weight: 78 9 kg (174 lb)   Height: 5' 4\" (1 626 m)      Physical Exam  Vitals and nursing note reviewed  Constitutional:       Appearance: She is well-developed  HENT:      Head: Normocephalic and atraumatic  Cardiovascular:      Rate and Rhythm: Normal rate and regular rhythm  Heart sounds: Normal heart sounds  Pulmonary:      Effort: Pulmonary effort is normal       Breath sounds: Normal breath sounds  Abdominal:      General: Bowel sounds are normal       Palpations: Abdomen is soft  Musculoskeletal:         General: Tenderness present        Comments: Paraspinal L3-L5 right greater than left with no " central tenderness  Good rom  Tenderness right si joint  Skin:     General: Skin is warm and dry  Neurological:      Mental Status: She is alert and oriented to person, place, and time  Psychiatric:         Behavior: Behavior normal          Thought Content:  Thought content normal          Judgment: Judgment normal

## 2023-05-21 PROBLEM — M62.838 MUSCLE SPASM: Status: ACTIVE | Noted: 2023-05-21

## 2023-05-21 PROBLEM — Z12.11 SCREEN FOR COLON CANCER: Status: ACTIVE | Noted: 2023-05-21

## 2023-05-21 NOTE — ASSESSMENT & PLAN NOTE
Low back pain appears more muscular  Pt will try cyclobenzaprine at bedtime 1/2-1 tab as needed   Should not drive with medication

## 2023-05-26 ENCOUNTER — CLINICAL SUPPORT (OUTPATIENT)
Dept: FAMILY MEDICINE CLINIC | Facility: CLINIC | Age: 68
End: 2023-05-26

## 2023-05-26 DIAGNOSIS — Z23 NEED FOR VACCINATION: Primary | ICD-10-CM

## 2023-06-01 NOTE — TELEPHONE ENCOUNTER
Disregard this request - I spoke with patient this morning and reviewed her documentation and saw she was on these medications in the past     Authorization is submitted and awaiting determinaton

## 2023-06-05 NOTE — TELEPHONE ENCOUNTER
Checked on status of Crestor Prior Auth through [facility] at [phone]  Was advised it is in process for clinical - should take up to 8 business days  [id number]    Should have an answer next week

## 2023-06-08 ENCOUNTER — TELEPHONE (OUTPATIENT)
Dept: FAMILY MEDICINE CLINIC | Facility: CLINIC | Age: 68
End: 2023-06-08

## 2023-06-08 NOTE — TELEPHONE ENCOUNTER
Submitted 36 pages of clinical information for appeal to patient's [employer] for authorization of her Crestor 20mg      Awaiting determinaton on appeal

## 2023-06-27 ENCOUNTER — OFFICE VISIT (OUTPATIENT)
Dept: FAMILY MEDICINE CLINIC | Facility: CLINIC | Age: 68
End: 2023-06-27
Payer: COMMERCIAL

## 2023-06-27 ENCOUNTER — HOSPITAL ENCOUNTER (OUTPATIENT)
Dept: RADIOLOGY | Facility: HOSPITAL | Age: 68
Discharge: HOME | End: 2023-06-27
Payer: COMMERCIAL

## 2023-06-27 VITALS
HEART RATE: 87 BPM | SYSTOLIC BLOOD PRESSURE: 126 MMHG | OXYGEN SATURATION: 96 % | DIASTOLIC BLOOD PRESSURE: 80 MMHG | BODY MASS INDEX: 27.83 KG/M2 | TEMPERATURE: 98.9 F | WEIGHT: 163 LBS | HEIGHT: 64 IN

## 2023-06-27 DIAGNOSIS — R06.2 WHEEZING: ICD-10-CM

## 2023-06-27 DIAGNOSIS — J40 BRONCHITIS: Primary | ICD-10-CM

## 2023-06-27 DIAGNOSIS — J40 BRONCHITIS: ICD-10-CM

## 2023-06-27 DIAGNOSIS — R43.2 LOSS OF TASTE: ICD-10-CM

## 2023-06-27 PROCEDURE — 71046 X-RAY EXAM CHEST 2 VIEWS: CPT

## 2023-06-27 PROCEDURE — 87635 SARS-COV-2 COVID-19 AMP PRB: CPT | Performed by: NURSE PRACTITIONER

## 2023-06-27 RX ORDER — ALBUTEROL SULFATE 90 UG/1
2 AEROSOL, METERED RESPIRATORY (INHALATION) EVERY 6 HOURS PRN
Qty: 6.7 G | Refills: 0 | Status: SHIPPED | OUTPATIENT
Start: 2023-06-27

## 2023-06-27 RX ORDER — GUAIFENESIN AND CODEINE PHOSPHATE 100; 10 MG/5ML; MG/5ML
5 SOLUTION ORAL 3 TIMES DAILY PRN
Qty: 120 ML | Refills: 0 | Status: SHIPPED | OUTPATIENT
Start: 2023-06-27 | End: 2023-07-01

## 2023-06-27 RX ORDER — PREDNISONE 20 MG/1
TABLET ORAL
COMMUNITY
Start: 2023-06-25

## 2023-06-27 RX ORDER — LEVOFLOXACIN 500 MG/1
TABLET, FILM COATED ORAL
COMMUNITY
Start: 2023-06-25

## 2023-06-27 NOTE — PROGRESS NOTES
Name: Yaya Ceja      : 1955      MRN: [id number]  Encounter Provider: JAMES Harman  Encounter Date: 2023   Encounter department: [facility]     1  Bronchitis  Assessment & Plan:  Check CXR  Continue levaquin  Continue prednisone  Add albuterol  Guaifenesin-codeine every 8 hours as needed  Increase fluids, steamy showers  Nasal spray  Sleep with head of bed elevated    Orders:  -     COVID Only- Office Collect  -     XR chest pa & lateral; Future; Expected date: 2023    2  Wheezing  Assessment & Plan:  Albuterol prn    Orders:  -     guaifenesin-codeine (GUAIFENESIN AC) 100-10 MG/5ML liquid; Take 5 mL by mouth 3 (three) times a day as needed for cough for up to 4 days  -     albuterol (Proventil HFA) 90 mcg/act inhaler; Inhale 2 puffs every 6 (six) hours as needed for wheezing or shortness of breath  -     XR chest pa & lateral; Future; Expected date: 2023    3  Loss of taste  Assessment & Plan:  R/o covid, swab collected today    Orders:  -     COVID Only- Office Collect         Subjective      Started 10 days ago with sore throat then cough, dry tight cough  Feels in the upper part of her lungs  Getting into coughing fits  On day 5 without being to smell or taste anything  No appetite  Did a covid test in the urgent care on  negative  Was put on prednisone 60mg and levaquin and hasnot noticed an improvement in her symptoms  No Abd pain,nv, d  No strep test completed  Review of Systems   Constitutional: Positive for activity change, appetite change and fatigue  Negative for fever  HENT: Positive for congestion, postnasal drip, sinus pressure, sinus pain and sore throat  Respiratory: Positive for cough, shortness of breath and wheezing  Cardiovascular: Negative  Neurological: Positive for headaches  Hematological: Negative          Current Outpatient Medications on File Prior to Visit   Medication Sig   • "Acetylcysteine (NAC PO) Take by mouth 2 (two) times a day   • aspirin 81 MG tablet Take 81 mg by mouth daily   • atenolol (TENORMIN) 25 mg tablet Take 1 5 tablets (37 5 mg total) by mouth daily   • Biotin 10 MG CAPS    • cholecalciferol (VITAMIN D3) 1,000 units tablet Take by mouth   • Crestor 20 MG tablet Take 1 tablet (20 mg total) by mouth daily   • Cyanocobalamin (Vitamin B 12) 500 MCG TABS    • cyclobenzaprine (FLEXERIL) 10 mg tablet Take 1 tablet (10 mg total) by mouth daily at bedtime as needed for muscle spasms   • levofloxacin (LEVAQUIN) 500 mg tablet take 1 tablet by mouth once daily for 10 days   • LORazepam (ATIVAN) 0 5 mg tablet Take 1 tablet (0 5 mg total) by mouth daily at bedtime   • Lutein 10 MG TABS 40 mg    • nitroglycerin (Nitrostat) 0 4 mg SL tablet Place 1 tablet (0 4 mg total) under the tongue every 5 (five) minutes as needed for chest pain   • Omega-3 Fatty Acids (fish oil) 1,000 mg Take 1,100 mg by mouth daily   • predniSONE 20 mg tablet TAKE 3 TABLETS BY MOUTH ONCE DAILY WITH FOOD   • Synthroid 75 MCG tablet Take 1 tablet (75 mcg total) by mouth daily       Objective     /80   Pulse 87   Temp 98 9 °F (37 2 °C) (Tympanic)   Ht 5' 4\" (1 626 m)   Wt 73 9 kg (163 lb)   SpO2 96%   BMI 27 98 kg/m²     Physical Exam  Vitals and nursing note reviewed  Constitutional:       General: She is not in acute distress  Appearance: Normal appearance  She is normal weight  She is ill-appearing  HENT:      Head: Normocephalic  Right Ear: Tympanic membrane, ear canal and external ear normal       Left Ear: Tympanic membrane, ear canal and external ear normal       Nose: Congestion and rhinorrhea present  Mouth/Throat:      Mouth: Mucous membranes are moist       Pharynx: Posterior oropharyngeal erythema present  Eyes:      General: No scleral icterus  Right eye: No discharge  Left eye: No discharge  Extraocular Movements: Extraocular movements intact        " Conjunctiva/sclera: Conjunctivae normal       Pupils: Pupils are equal, round, and reactive to light  Cardiovascular:      Rate and Rhythm: Normal rate and regular rhythm  Heart sounds: No murmur heard  Pulmonary:      Effort: Pulmonary effort is normal       Breath sounds: Wheezing and rhonchi present  Abdominal:      Palpations: Abdomen is soft  Tenderness: There is no abdominal tenderness  Musculoskeletal:      Cervical back: Neck supple  Skin:     General: Skin is warm  Findings: No rash  Neurological:      Mental Status: She is alert and oriented to person, place, and time         [facility]

## 2023-06-28 ENCOUNTER — TELEPHONE (OUTPATIENT)
Dept: FAMILY MEDICINE CLINIC | Facility: CLINIC | Age: 68
End: 2023-06-28

## 2023-06-28 LAB — SARS-COV-2 RNA RESP QL NAA+PROBE: NEGATIVE

## 2023-06-28 NOTE — TELEPHONE ENCOUNTER
----- Message from Sarah Evans, [address] sent at 6/28/2023  3:00 PM EDT -----  COVID test is negative, continue current regimen as we discussed at your office visit yesterday  I am waiting for radiologist to read results of chest xray, once I have those results we will let you know

## 2023-06-28 NOTE — RESULT ENCOUNTER NOTE
COVID test is negative, continue current regimen as we discussed at your office visit yesterday  I am waiting for radiologist to read results of chest xray, once I have those results we will let you know

## 2023-06-29 ENCOUNTER — TELEPHONE (OUTPATIENT)
Dept: FAMILY MEDICINE CLINIC | Facility: CLINIC | Age: 68
End: 2023-06-29

## 2023-06-29 NOTE — TELEPHONE ENCOUNTER
----- Message from Asuncion Mason sent at 6/29/2023  3:26 PM EDT -----  Regarding: Prednisone Taper? Contact: [phone]  It has been two days now since I went to the hospital to get a chest x-ray  Shouldn’t the results have come back by now?

## 2023-06-29 NOTE — TELEPHONE ENCOUNTER
I call [facility] radiology and they will have the results send to [facility] for her chest x ray

## 2023-06-30 ENCOUNTER — TELEPHONE (OUTPATIENT)
Dept: FAMILY MEDICINE CLINIC | Facility: CLINIC | Age: 68
End: 2023-06-30

## 2023-06-30 DIAGNOSIS — J40 BRONCHITIS: Primary | ICD-10-CM

## 2023-06-30 PROBLEM — R06.2 WHEEZING: Status: ACTIVE | Noted: 2023-06-30

## 2023-06-30 PROBLEM — R43.2 LOSS OF TASTE: Status: ACTIVE | Noted: 2023-06-30

## 2023-06-30 RX ORDER — AMOXICILLIN AND CLAVULANATE POTASSIUM 875; 125 MG/1; MG/1
1 TABLET, FILM COATED ORAL EVERY 12 HOURS SCHEDULED
Qty: 14 TABLET | Refills: 0 | Status: SHIPPED | OUTPATIENT
Start: 2023-06-30 | End: 2023-07-07

## 2023-06-30 NOTE — TELEPHONE ENCOUNTER
Pt adv continue with medication regimen, increase fluids, steamy showers  Sleeping with head of bed elevated, she can try a neti pot

## 2023-06-30 NOTE — TELEPHONE ENCOUNTER
No relief from sinus problems w/ medication  Not coughing as much but still having the other sx  Lots of sinus pressure and lost taste and smell extreme congestion  What else can she do?  Also taking nasal steroid     Pt advised on results Mastoid Interpolation Flap Text: A decision was made to reconstruct the defect utilizing an interpolation axial flap and a staged reconstruction.  A telfa template was made of the defect.  This telfa template was then used to outline the mastoid interpolation flap.  The donor area for the pedicle flap was then injected with anesthesia.  The flap was excised through the skin and subcutaneous tissue down to the layer of the underlying musculature.  The pedicle flap was carefully excised within this deep plane to maintain its blood supply.  The edges of the donor site were undermined.   The donor site was closed in a primary fashion.  The pedicle was then rotated into position and sutured.  Once the tube was sutured into place, adequate blood supply was confirmed with blanching and refill.  The pedicle was then wrapped with xeroform gauze and dressed appropriately with a telfa and gauze bandage to ensure continued blood supply and protect the attached pedicle.

## 2023-06-30 NOTE — TELEPHONE ENCOUNTER
----- Message from Shanti Abraham, [address] Zain[address] Wills sent at 6/30/2023  8:59 AM EDT -----  Chong Rodriguez, Your chest xray was normal, continue with medication regimen as we discussed

## 2023-07-01 NOTE — ASSESSMENT & PLAN NOTE
Check CXR  Continue levaquin  Continue prednisone  Add albuterol  Guaifenesin-codeine every 8 hours as needed  Increase fluids, steamy showers  Nasal spray  Sleep with head of bed elevated

## 2023-07-03 DIAGNOSIS — J40 BRONCHITIS: Primary | ICD-10-CM

## 2023-07-03 RX ORDER — DOXYCYCLINE 100 MG/1
100 TABLET ORAL 2 TIMES DAILY
Qty: 20 TABLET | Refills: 0 | Status: SHIPPED | OUTPATIENT
Start: 2023-07-03 | End: 2023-07-13

## 2023-07-20 PROBLEM — Z12.11 SCREEN FOR COLON CANCER: Status: RESOLVED | Noted: 2023-05-21 | Resolved: 2023-07-20

## 2023-08-24 PROBLEM — R43.0 LOSS OF SENSE OF SMELL: Status: ACTIVE | Noted: 2023-08-24

## 2023-08-25 ENCOUNTER — CLINICAL SUPPORT (OUTPATIENT)
Dept: FAMILY MEDICINE CLINIC | Facility: CLINIC | Age: 68
End: 2023-08-25
Payer: COMMERCIAL

## 2023-08-25 DIAGNOSIS — Z23 NEED FOR VACCINATION: Primary | ICD-10-CM

## 2023-08-25 PROCEDURE — 90471 IMMUNIZATION ADMIN: CPT

## 2023-08-25 PROCEDURE — 90750 HZV VACC RECOMBINANT IM: CPT

## 2023-09-06 DIAGNOSIS — E78.2 MIXED HYPERLIPIDEMIA: ICD-10-CM

## 2023-09-07 RX ORDER — ROSUVASTATIN CALCIUM 20 MG/1
20 TABLET, FILM COATED ORAL DAILY
Qty: 32 TABLET | Refills: 0 | Status: SHIPPED | OUTPATIENT
Start: 2023-09-07 | End: 2023-09-11 | Stop reason: SDUPTHER

## 2023-09-08 DIAGNOSIS — F41.9 ANXIETY DISORDER, UNSPECIFIED TYPE: ICD-10-CM

## 2023-09-11 DIAGNOSIS — E78.2 MIXED HYPERLIPIDEMIA: ICD-10-CM

## 2023-09-11 RX ORDER — ROSUVASTATIN CALCIUM 20 MG/1
20 TABLET, FILM COATED ORAL DAILY
Qty: 32 TABLET | Refills: 3 | Status: SHIPPED | OUTPATIENT
Start: 2023-09-11

## 2023-10-08 DIAGNOSIS — F41.9 ANXIETY DISORDER, UNSPECIFIED TYPE: ICD-10-CM

## 2023-10-09 RX ORDER — LORAZEPAM 0.5 MG/1
0.5 TABLET ORAL
Qty: 30 TABLET | Refills: 0 | Status: SHIPPED | OUTPATIENT
Start: 2023-10-09

## 2023-10-17 ENCOUNTER — TELEPHONE (OUTPATIENT)
Dept: GASTROENTEROLOGY | Facility: CLINIC | Age: 68
End: 2023-10-17

## 2023-10-17 NOTE — TELEPHONE ENCOUNTER
Scheduled date of colonoscopy (as of today):02.01.24  Physician performing colonoscopy:DR Roberto Carlos Eldridge  Location of colonoscopy:[facility]  Bowel prep reviewed with patient:D/M  Instructions reviewed with patient by:MAILED  Clearances: N/A    10/17/23  Screened by: Zulema Reese    Referring Provider Dakota Tang    Pre- Screening: Body mass index is 26.78 kg/m². Has patient been referred for a routine screening Colonoscopy? yes  Is the patient between 43-73 years old? yes      Previous Colonoscopy yes   If yes:    Date: 5+YRS AGO    Facility:     Reason:       SCHEDULING STAFF: If the patient is between 39yrs-51yrs, please advise patient to confirm benefits/coverage with their insurance company for a routine screening colonoscopy, some insurance carriers will only cover at [facility] or [facility]. If the patient is over 66years old, please schedule an office visit. Does the patient want to see a Gastroenterologist prior to their procedure OR are they having any GI symptoms? no    Has the patient been hospitalized or had abdominal surgery in the past 6 months? no    Does the patient use supplemental oxygen? no    Does the patient take Coumadin, Lovenox, Plavix, Elliquis, Xarelto, or other blood thinning medication? no    Has the patient had a stroke, cardiac event, or stent placed in the past year? no    SCHEDULING STAFF: If patient answers NO to above questions, then schedule procedure. If patient answers YES to above questions, then schedule office appointment. If patient is between 45yrs - 49yrs, please advise patient that we will have to confirm benefits & coverage with their insurance company for a routine screening colonoscopy.

## 2023-10-23 DIAGNOSIS — I25.10 ARTERIOSCLEROSIS OF CORONARY ARTERY: ICD-10-CM

## 2023-10-23 RX ORDER — ATENOLOL 25 MG/1
37.5 TABLET ORAL DAILY
Qty: 135 TABLET | Refills: 3 | Status: SHIPPED | OUTPATIENT
Start: 2023-10-23

## 2023-10-25 DIAGNOSIS — E03.9 HYPOTHYROIDISM, UNSPECIFIED TYPE: ICD-10-CM

## 2023-10-25 RX ORDER — LEVOTHYROXINE SODIUM 75 MCG
TABLET ORAL
Qty: 90 TABLET | Refills: 3 | Status: SHIPPED | OUTPATIENT
Start: 2023-10-25

## 2023-11-10 DIAGNOSIS — F41.9 ANXIETY DISORDER, UNSPECIFIED TYPE: ICD-10-CM

## 2023-11-11 RX ORDER — LORAZEPAM 0.5 MG/1
0.5 TABLET ORAL
Qty: 30 TABLET | Refills: 0 | Status: SHIPPED | OUTPATIENT
Start: 2023-11-11

## 2023-12-05 RX ORDER — ROSUVASTATIN CALCIUM 20 MG/1
TABLET, FILM COATED ORAL
Qty: 38 TABLET | OUTPATIENT
Start: 2023-12-05

## 2023-12-07 RX ORDER — LORAZEPAM 0.5 MG/1
TABLET ORAL
Qty: 15 TABLET | OUTPATIENT
Start: 2023-12-07

## 2023-12-13 ENCOUNTER — APPOINTMENT (OUTPATIENT)
Dept: LAB | Facility: CLINIC | Age: 68
End: 2023-12-13
Payer: COMMERCIAL

## 2023-12-13 DIAGNOSIS — E78.00 HYPERCHOLESTEROLEMIA: ICD-10-CM

## 2023-12-13 DIAGNOSIS — E03.9 HYPOTHYROIDISM, UNSPECIFIED TYPE: ICD-10-CM

## 2023-12-13 LAB
ALBUMIN SERPL BCP-MCNC: 4.2 G/DL (ref 3.5–5)
ALP SERPL-CCNC: 68 U/L (ref 34–104)
ALT SERPL W P-5'-P-CCNC: 17 U/L (ref 7–52)
ANION GAP SERPL CALCULATED.3IONS-SCNC: 6 MMOL/L
AST SERPL W P-5'-P-CCNC: 21 U/L (ref 13–39)
BILIRUB SERPL-MCNC: 0.93 MG/DL (ref 0.2–1)
BUN SERPL-MCNC: 12 MG/DL (ref 5–25)
CALCIUM SERPL-MCNC: 9.8 MG/DL (ref 8.4–10.2)
CHLORIDE SERPL-SCNC: 104 MMOL/L (ref 96–108)
CHOLEST SERPL-MCNC: 175 MG/DL
CO2 SERPL-SCNC: 27 MMOL/L (ref 21–32)
CREAT SERPL-MCNC: 0.69 MG/DL (ref 0.6–1.3)
GFR SERPL CREATININE-BSD FRML MDRD: 89 ML/MIN/1.73SQ M
GLUCOSE P FAST SERPL-MCNC: 90 MG/DL (ref 65–99)
HDLC SERPL-MCNC: 67 MG/DL
LDLC SERPL CALC-MCNC: 93 MG/DL (ref 0–100)
POTASSIUM SERPL-SCNC: 4.2 MMOL/L (ref 3.5–5.3)
PROT SERPL-MCNC: 7.2 G/DL (ref 6.4–8.4)
SODIUM SERPL-SCNC: 137 MMOL/L (ref 135–147)
TRIGL SERPL-MCNC: 75 MG/DL
TSH SERPL DL<=0.05 MIU/L-ACNC: 1.82 UIU/ML (ref 0.45–4.5)

## 2023-12-13 PROCEDURE — 84443 ASSAY THYROID STIM HORMONE: CPT

## 2023-12-13 PROCEDURE — 80053 COMPREHEN METABOLIC PANEL: CPT

## 2023-12-13 PROCEDURE — 36415 COLL VENOUS BLD VENIPUNCTURE: CPT

## 2023-12-15 ENCOUNTER — OFFICE VISIT (OUTPATIENT)
Dept: FAMILY MEDICINE CLINIC | Facility: CLINIC | Age: 68
End: 2023-12-15
Payer: COMMERCIAL

## 2023-12-15 VITALS
SYSTOLIC BLOOD PRESSURE: 126 MMHG | HEIGHT: 64 IN | DIASTOLIC BLOOD PRESSURE: 80 MMHG | HEART RATE: 81 BPM | TEMPERATURE: 98.5 F | BODY MASS INDEX: 29.53 KG/M2 | OXYGEN SATURATION: 98 % | WEIGHT: 173 LBS

## 2023-12-15 DIAGNOSIS — E03.9 ACQUIRED HYPOTHYROIDISM: ICD-10-CM

## 2023-12-15 DIAGNOSIS — E78.00 HYPERCHOLESTEROLEMIA: ICD-10-CM

## 2023-12-15 DIAGNOSIS — E78.2 MIXED HYPERLIPIDEMIA: ICD-10-CM

## 2023-12-15 DIAGNOSIS — R73.01 ELEVATED FASTING GLUCOSE: ICD-10-CM

## 2023-12-15 DIAGNOSIS — E78.49 OTHER HYPERLIPIDEMIA: ICD-10-CM

## 2023-12-15 DIAGNOSIS — Z12.31 ENCOUNTER FOR SCREENING MAMMOGRAM FOR BREAST CANCER: ICD-10-CM

## 2023-12-15 DIAGNOSIS — D50.8 OTHER IRON DEFICIENCY ANEMIA: ICD-10-CM

## 2023-12-15 DIAGNOSIS — Z00.00 WELL ADULT EXAM: Primary | ICD-10-CM

## 2023-12-15 DIAGNOSIS — F41.1 GENERALIZED ANXIETY DISORDER: ICD-10-CM

## 2023-12-15 PROBLEM — R06.2 WHEEZING: Status: RESOLVED | Noted: 2023-06-30 | Resolved: 2023-12-15

## 2023-12-15 PROBLEM — M62.838 MUSCLE SPASM: Status: RESOLVED | Noted: 2023-05-21 | Resolved: 2023-12-15

## 2023-12-15 PROBLEM — J40 BRONCHITIS: Status: RESOLVED | Noted: 2023-06-30 | Resolved: 2023-12-15

## 2023-12-15 PROCEDURE — 3288F FALL RISK ASSESSMENT DOCD: CPT | Performed by: FAMILY MEDICINE

## 2023-12-15 PROCEDURE — 1101F PT FALLS ASSESS-DOCD LE1/YR: CPT | Performed by: FAMILY MEDICINE

## 2023-12-15 PROCEDURE — 1160F RVW MEDS BY RX/DR IN RCRD: CPT | Performed by: FAMILY MEDICINE

## 2023-12-15 PROCEDURE — G0439 PPPS, SUBSEQ VISIT: HCPCS | Performed by: FAMILY MEDICINE

## 2023-12-15 PROCEDURE — 99213 OFFICE O/P EST LOW 20 MIN: CPT | Performed by: FAMILY MEDICINE

## 2023-12-15 PROCEDURE — 3725F SCREEN DEPRESSION PERFORMED: CPT | Performed by: FAMILY MEDICINE

## 2023-12-15 PROCEDURE — 1159F MED LIST DOCD IN RCRD: CPT | Performed by: FAMILY MEDICINE

## 2023-12-15 RX ORDER — ROSUVASTATIN CALCIUM 20 MG/1
20 TABLET, FILM COATED ORAL DAILY
Qty: 32 TABLET | Refills: 5 | Status: SHIPPED | OUTPATIENT
Start: 2023-12-15 | End: 2023-12-15 | Stop reason: SDUPTHER

## 2023-12-15 RX ORDER — ROSUVASTATIN CALCIUM 20 MG/1
20 TABLET, FILM COATED ORAL DAILY
Qty: 32 TABLET | Refills: 5 | Status: SHIPPED | OUTPATIENT
Start: 2023-12-15

## 2023-12-15 RX ORDER — EZETIMIBE 10 MG/1
10 TABLET ORAL DAILY
COMMUNITY
Start: 2023-10-03 | End: 2023-12-31 | Stop reason: ALTCHOICE

## 2023-12-15 NOTE — PROGRESS NOTES
ADULT ANNUAL PHYSICAL  [facility]    NAME: Shakira Trejo  AGE: 68 y.o. SEX: female  : 1955     DATE: 2023     Assessment and Plan:     1. Well adult exam    2. Acquired hypothyroidism  Assessment & Plan:  Tsh normal. Continue current dose    Orders:  -     TSH, 3rd generation with Free T4 reflex; Future; Expected date: 06/15/2024    3. Other hyperlipidemia  Assessment & Plan:  Lipid panel 12/15 good control on crestor 20mg daily    Orders:  -     Lipid Panel with Direct LDL reflex; Future; Expected date: 06/15/2024  -     Comprehensive metabolic panel; Future; Expected date: 06/15/2024    4. Mixed hyperlipidemia  Assessment & Plan:  Lipid panel 12/15 good control on crestor 20mg daily    Orders:  -     Crestor 20 MG tablet; Take 1 tablet (20 mg total) by mouth daily Brand necessary    5. Generalized anxiety disorder  Assessment & Plan:  Medication agreement up to date, pdmp reviewed regularly. Lorazepam at bedtime as needed for anxiety/sleep      6. Encounter for screening mammogram for breast cancer  -     Mammo screening bilateral w 3d & cad; Future; Expected date: 12/15/2023    7. Other iron deficiency anemia    8. Elevated fasting glucose  -     Hemoglobin A1C; Future; Expected date: 06/15/2024    9. Hypercholesterolemia  Assessment & Plan:  Lipid panel 12/15 good control on crestor 20mg daily          Immunizations and preventive care screenings were discussed with patient today. Appropriate education was printed on patient's after visit summary.    Counseling:  Dental Health: discussed importance of regular tooth brushing, flossing, and dental visits.  Exercise: the importance of regular exercise/physical activity was discussed. Recommend exercise 3-5 times per week for at least 30 minutes.     BMI Counseling: Body mass index is 29.7 kg/m². The BMI is above normal. Nutrition recommendations include decreasing portion sizes. Exercise  recommendations include exercising 3-5 times per week. No pharmacotherapy was ordered. Rationale for BMI follow-up plan is due to patient being overweight or obese.     Depression Screening and Follow-up Plan: Patient was screened for depression during today's encounter. They screened negative with a PHQ-2 score of 0.        No follow-ups on file.     Chief Complaint:     Chief Complaint   Patient presents with    Follow-up     6 month follow up and DUE physical  Part 2 up to date 5/19/23  Saw Dr. Christy nose throat [facility] and treating with theofaline nasal spray 2 sprays 2x daily helped minor ways can drink black coffee   Parosmia and smells weird things like rotten meat  since having covid back in June 2023 - no smell 6 mnths  Past wk major congestion post nasal drip and headache, sinus infxn, at home covid neg, nasal spray  Patient awae due pap advised we do in office   Blood work complete 12/23  Urinary incont scrn complete      History of Present Illness:     Pt seen for physical today.   Lost her smell for 6 months after covid. Seen by ent and prescribed Theophylline nasal spray.             Review of Systems:     Review of Systems   Constitutional: Negative.  Negative for fatigue and fever.   HENT: Negative.     Eyes: Negative.    Respiratory: Negative.  Negative for cough.    Cardiovascular: Negative.    Gastrointestinal: Negative.    Endocrine: Negative.    Genitourinary: Negative.    Musculoskeletal: Negative.    Skin: Negative.    Allergic/Immunologic: Negative.    Neurological: Negative.    Psychiatric/Behavioral: Negative.        Past Medical History:     Past Medical History:   Diagnosis Date    Allergic     Anemia     Anxiety     Coronary artery disease     Disease of thyroid gland     Ganglion cyst of wrist, right     last assessed nov 4 2016    GERD (gastroesophageal reflux disease)     History of Lyme disease     Pneumonia     Presence of stent in LAD coronary artery     last assessed april 14 2017     Serum total bilirubin elevated 12/11/2020    Status post de Quervain's release surgery     last assessed dec 16 2016    Visual impairment       Past Surgical History:     Past Surgical History:   Procedure Laterality Date    BREAST EXCISIONAL BIOPSY Left 1996    benign    BREAST SURGERY      CORONARY ANGIOPLASTY WITH STENT PLACEMENT      GANGLION CYST EXCISION Right 12/08/2016    Procedure: EXCISION GANGLION CYST right first dorsal extensor compartment;  Surgeon: Clifton Kan MD;  Location:  MAIN OR;  Service:     ME HALLUX RIGIDUS W/CHEILECTOMY 1ST MP JT W/O IMPLT Right 12/14/2021    Procedure: right index finger MUCOID CYST excision WITH CHEILECTOMY;  Surgeon: Clifton Kan MD;  Location:  MAIN OR;  Service: Orthopedics    ME INCISION EXTENSOR TENDON SHEATH WRIST Right 12/08/2016    Procedure: DEQUERVAINS RELEASE;  Surgeon: Clifton Kan MD;  Location:  MAIN OR;  Service: Orthopedics    TONSILLECTOMY        Social History:     Social History     Socioeconomic History    Marital status: /Civil Union     Spouse name: None    Number of children: None    Years of education: None    Highest education level: None   Occupational History    None   Tobacco Use    Smoking status: Never    Smokeless tobacco: Never    Tobacco comments:     Never have inhaled any type of tobacco or anything else   Vaping Use    Vaping status: Never Used   Substance and Sexual Activity    Alcohol use: No    Drug use: Never    Sexual activity: Not Currently     Partners: Female     Birth control/protection: Post-menopausal     Comment: Never have taken any type of birth control medication   Other Topics Concern    None   Social History Narrative    None     Social Determinants of Health     Financial Resource Strain: Not on file   Food Insecurity: Not on file   Transportation Needs: Not on file   Physical Activity: Not on file   Stress: Not on file   Social Connections: Not on file   Intimate Partner Violence: Not  on file   Housing Stability: Not on file      Family History:     Family History   Problem Relation Age of Onset    Heart disease Father     Cancer Father     Dementia Mother         Showed symptoms around 92 years old    Hyperlipidemia Sister     Thyroid disease Sister     Stroke Brother         Mini stroke    Heart failure Maternal Grandmother     No Known Problems Maternal Grandfather     No Known Problems Paternal Grandmother     No Known Problems Paternal Grandfather     Breast cancer Paternal Aunt         unknown primary with mets to breast-unknown age    Cancer Paternal Aunt 68        hip-bone    No Known Problems Daughter     No Known Problems Daughter     No Known Problems Daughter     Anxiety disorder Sister     COPD Sister         Smoker since the age of 16    No Known Problems Maternal Aunt     No Known Problems Maternal Aunt     No Known Problems Maternal Aunt     No Known Problems Maternal Aunt     Lung cancer Paternal Aunt         heavy smoker-unknown age      Current Medications:     Current Outpatient Medications   Medication Sig Dispense Refill    Acetylcysteine (NAC PO) Take by mouth 2 (two) times a day      albuterol (Proventil HFA) 90 mcg/act inhaler Inhale 2 puffs every 6 (six) hours as needed for wheezing or shortness of breath (Patient taking differently: Inhale 2 puffs as needed for wheezing or shortness of breath) 6.7 g 0    aspirin 81 MG tablet Take 81 mg by mouth daily      atenolol (TENORMIN) 25 mg tablet TAKE ONE AND ONE-HALF TABLETS DAILY 135 tablet 3    Biotin 10 MG CAPS       cholecalciferol (VITAMIN D3) 1,000 units tablet Take by mouth      Coenzyme Q10 (COQ10 PO) Take by mouth in the morning      Crestor 20 MG tablet Take 1 tablet (20 mg total) by mouth daily Brand necessary 32 tablet 5    Cyanocobalamin (Vitamin B 12) 500 MCG TABS       LORazepam (ATIVAN) 0.5 mg tablet take 1 tablet by mouth at bedtime 30 tablet 0    Lutein 10 MG TABS 40 mg       nitroglycerin (Nitrostat) 0.4 mg  "SL tablet Place 1 tablet (0.4 mg total) under the tongue every 5 (five) minutes as needed for chest pain 30 tablet 0    Omega-3 Fatty Acids (fish oil) 1,000 mg Take 1,100 mg by mouth daily      Oxymetazoline HCl (NASAL SPRAY NA) 2 sprays by Each Nare route 2 (two) times a day Theophyline nasal spray      Synthroid 75 MCG tablet TAKE 1 TABLET DAILY FOR HYPOTHYROIDISM 90 tablet 3     No current facility-administered medications for this visit.      Allergies:     Allergies   Allergen Reactions    Augmentin [Amoxicillin-Pot Clavulanate] Diarrhea and Abdominal Pain     Patient was prescribed this medication again in 7/23 and was unable to tolerate.    Tetracaine      Other reaction(s): Other (See Comments)  Eye Irritation, Burning  Other reaction(s): Other (See Comments)  Eye Irritation, Burning    Benzalkonium Chloride Other (See Comments)     Eye redness and pain      Physical Exam:     /80   Pulse 81   Temp 98.5 °F (36.9 °C)   Ht 5' 4\" (1.626 m)   Wt 78.5 kg (173 lb)   SpO2 98%   BMI 29.70 kg/m²     Physical Exam  Vitals and nursing note reviewed.   Constitutional:       Appearance: She is well-developed.   HENT:      Head: Normocephalic and atraumatic.      Right Ear: External ear normal.      Left Ear: External ear normal.      Nose: Nose normal.   Eyes:      Conjunctiva/sclera: Conjunctivae normal.      Pupils: Pupils are equal, round, and reactive to light.   Cardiovascular:      Rate and Rhythm: Normal rate and regular rhythm.      Heart sounds: Normal heart sounds.   Pulmonary:      Effort: Pulmonary effort is normal.      Breath sounds: Normal breath sounds.   Abdominal:      General: Bowel sounds are normal.      Palpations: Abdomen is soft.   Musculoskeletal:         General: Normal range of motion.      Cervical back: Normal range of motion and neck supple.   Skin:     General: Skin is warm and dry.   Neurological:      Mental Status: She is alert and oriented to person, place, and time. "   Psychiatric:         Behavior: Behavior normal.         Thought Content: Thought content normal.         Judgment: Judgment normal.          DO NAVI Walker[facility]

## 2023-12-15 NOTE — PATIENT INSTRUCTIONS
3/10/2024 mammogram   Blood work in 6 months Wellness Visit for Adults   AMBULATORY CARE:   A wellness visit  is when you see your healthcare provider to get screened for health problems. Your healthcare provider will also give you advice on how to stay healthy. Write down your questions so you remember to ask them. Ask your healthcare provider how often you should have a wellness visit.  What happens at a wellness visit:  Your healthcare provider will ask about your health, and your family history of health problems. This includes high blood pressure, heart disease, and cancer. He or she will ask if you have symptoms that concern you, if you smoke, and about your mood. You may also be asked about your intake of medicines, supplements, food, and alcohol. Any of the following may be done:  Your weight  will be checked. Your height may also be checked so your body mass index (BMI) can be calculated. Your BMI shows if you are at a healthy weight.    Your blood pressure  and heart rate will be checked. Your temperature may also be checked.    Blood and urine tests  may be done. Blood tests may be done to check your cholesterol levels. Abnormal cholesterol levels increase your risk for heart disease and stroke. You may also need a blood or urine test to check for diabetes if you are at increased risk. Urine tests may be done to look for signs of an infection or kidney disease.    A physical exam  includes checking your heartbeat and lungs with a stethoscope. Your healthcare provider may also check your skin to look for sun damage.    Screening tests  may be recommended. A screening test is done to check for diseases that may not cause symptoms. The screening tests you may need depend on your age, gender, family history, and lifestyle habits. For example, colorectal screening may be recommended if you are 50 years old or older.    Screening tests you need if you are a woman:   A Pap smear  is used to screen for cervical  cancer. Pap smears are usually done every 3 to 5 years depending on your age. You may need them more often if you have had abnormal Pap smear test results in the past. Ask your healthcare provider how often you should have a Pap smear.    A mammogram  is an x-ray of your breasts to screen for breast cancer. Experts recommend mammograms every 2 years starting at age 50 years. You may need a mammogram at age 49 years or younger if you have an increased risk for breast cancer. Talk to your healthcare provider about when you should start having mammograms and how often you need them.    Vaccines you may need:   Get an influenza vaccine  every year. The influenza vaccine protects you from the flu. Several types of viruses cause the flu. The viruses change over time, so new vaccines are made each year.    Get a tetanus-diphtheria (Td) booster vaccine  every 10 years. This vaccine protects you against tetanus and diphtheria. Tetanus is a severe infection that may cause painful muscle spasms and lockjaw. Diphtheria is a severe bacterial infection that causes a thick covering in the back of your mouth and throat.    Get a human papillomavirus (HPV) vaccine  if you are female and aged 19 to 26 or male 19 to 21 and never received it. This vaccine protects you from HPV infection. HPV is the most common infection spread by sexual contact. HPV may also cause vaginal, penile, and anal cancers.    Get a pneumococcal vaccine  if you are aged 65 years or older. The pneumococcal vaccine is an injection given to protect you from pneumococcal disease. Pneumococcal disease is an infection caused by pneumococcal bacteria. The infection may cause pneumonia, meningitis, or an ear infection.    Get a shingles vaccine  if you are 60 or older, even if you have had shingles before. The shingles vaccine is an injection to protect you from the varicella-zoster virus. This is the same virus that causes chickenpox. Shingles is a painful rash that  develops in people who had chickenpox or have been exposed to the virus.    How to eat healthy:  My Plate is a model for planning healthy meals. It shows the types and amounts of foods that should go on your plate. Fruits and vegetables make up about half of your plate, and grains and protein make up the other half. A serving of dairy is included on the side of your plate. The amount of calories and serving sizes you need depends on your age, gender, weight, and height. Examples of healthy foods are listed below:  Eat a variety of vegetables  such as dark green, red, and orange vegetables. You can also include canned vegetables low in sodium (salt) and frozen vegetables without added butter or sauces.    Eat a variety of fresh fruits , canned fruit in 100% juice, frozen fruit, and dried fruit.    Include whole grains.  At least half of the grains you eat should be whole grains. Examples include whole-wheat bread, wheat pasta, brown rice, and whole-grain cereals such as oatmeal.    Eat a variety of protein foods such as seafood (fish and shellfish), lean meat, and poultry without skin (turkey and chicken). Examples of lean meats include pork leg, shoulder, or tenderloin, and beef round, sirloin, tenderloin, and extra lean ground beef. Other protein foods include eggs and egg substitutes, beans, peas, soy products, nuts, and seeds.    Choose low-fat dairy products such as skim or 1% milk or low-fat yogurt, cheese, and cottage cheese.    Limit unhealthy fats  such as butter, hard margarine, and shortening.       Exercise:  Exercise at least 30 minutes per day on most days of the week. Some examples of exercise include walking, biking, dancing, and swimming. You can also fit in more physical activity by taking the stairs instead of the elevator or parking farther away from stores. Include muscle strengthening activities 2 days each week. Regular exercise provides many health benefits. It helps you manage your weight,  and decreases your risk for type 2 diabetes, heart disease, stroke, and high blood pressure. Exercise can also help improve your mood. Ask your healthcare provider about the best exercise plan for you.       General health and safety guidelines:   Do not smoke.  Nicotine and other chemicals in cigarettes and cigars can cause lung damage. Ask your healthcare provider for information if you currently smoke and need help to quit. E-cigarettes or smokeless tobacco still contain nicotine. Talk to your healthcare provider before you use these products.    Limit alcohol.  A drink of alcohol is 12 ounces of beer, 5 ounces of wine, or 1½ ounces of liquor.    Lose weight, if needed.  Being overweight increases your risk of certain health conditions. These include heart disease, high blood pressure, type 2 diabetes, and certain types of cancer.    Protect your skin.  Do not sunbathe or use tanning beds. Use sunscreen with a SPF 15 or higher. Apply sunscreen at least 15 minutes before you go outside. Reapply sunscreen every 2 hours. Wear protective clothing, hats, and sunglasses when you are outside.    Drive safely.  Always wear your seatbelt. Make sure everyone in your car wears a seatbelt. A seatbelt can save your life if you are in an accident. Do not use your cell phone when you are driving. This could distract you and cause an accident. Pull over if you need to make a call or send a text message.    Practice safe sex.  Use latex condoms if are sexually active and have more than one partner. Your healthcare provider may recommend screening tests for sexually transmitted infections (STIs).    Wear helmets, lifejackets, and protective gear.  Always wear a helmet when you ride a bike or motorcycle, go skiing, or play sports that could cause a head injury. Wear protective equipment when you play sports. Wear a lifejacket when you are on a boat or doing water sports.    © Copyright [employer] 2023 Information is for End User's  use only and may not be sold, redistributed or otherwise used for commercial purposes.  The above information is an  only. It is not intended as medical advice for individual conditions or treatments. Talk to your doctor, nurse or pharmacist before following any medical regimen to see if it is safe and effective for you.

## 2023-12-31 DIAGNOSIS — F41.9 ANXIETY DISORDER, UNSPECIFIED TYPE: ICD-10-CM

## 2023-12-31 PROBLEM — E78.2 MIXED HYPERLIPIDEMIA: Status: ACTIVE | Noted: 2018-03-15

## 2023-12-31 PROBLEM — E78.49 OTHER HYPERLIPIDEMIA: Status: ACTIVE | Noted: 2018-03-15

## 2023-12-31 PROBLEM — Z00.00 WELL ADULT EXAM: Status: ACTIVE | Noted: 2023-05-21

## 2023-12-31 NOTE — ASSESSMENT & PLAN NOTE
Medication agreement up to date, pdmp reviewed regularly. Lorazepam at bedtime as needed for anxiety/sleep

## 2024-01-02 RX ORDER — LORAZEPAM 0.5 MG/1
0.5 TABLET ORAL
Qty: 30 TABLET | Refills: 0 | Status: SHIPPED | OUTPATIENT
Start: 2024-01-02

## 2024-02-01 ENCOUNTER — HOSPITAL ENCOUNTER (OUTPATIENT)
Dept: GASTROENTEROLOGY | Facility: HOSPITAL | Age: 69
Setting detail: OUTPATIENT SURGERY
End: 2024-02-01
Attending: INTERNAL MEDICINE
Payer: COMMERCIAL

## 2024-02-01 ENCOUNTER — ANESTHESIA (OUTPATIENT)
Dept: GASTROENTEROLOGY | Facility: HOSPITAL | Age: 69
End: 2024-02-01

## 2024-02-01 ENCOUNTER — ANESTHESIA EVENT (OUTPATIENT)
Dept: GASTROENTEROLOGY | Facility: HOSPITAL | Age: 69
End: 2024-02-01

## 2024-02-01 VITALS
DIASTOLIC BLOOD PRESSURE: 78 MMHG | OXYGEN SATURATION: 99 % | RESPIRATION RATE: 19 BRPM | HEART RATE: 59 BPM | TEMPERATURE: 97.6 F | SYSTOLIC BLOOD PRESSURE: 121 MMHG

## 2024-02-01 DIAGNOSIS — Z12.11 SCREEN FOR COLON CANCER: ICD-10-CM

## 2024-02-01 PROCEDURE — 45380 COLONOSCOPY AND BIOPSY: CPT | Performed by: INTERNAL MEDICINE

## 2024-02-01 PROCEDURE — 88305 TISSUE EXAM BY PATHOLOGIST: CPT | Performed by: PATHOLOGY

## 2024-02-01 RX ORDER — PROPOFOL 10 MG/ML
INJECTION, EMULSION INTRAVENOUS AS NEEDED
Status: DISCONTINUED | OUTPATIENT
Start: 2024-02-01 | End: 2024-02-01

## 2024-02-01 RX ORDER — SODIUM CHLORIDE, SODIUM LACTATE, POTASSIUM CHLORIDE, CALCIUM CHLORIDE 600; 310; 30; 20 MG/100ML; MG/100ML; MG/100ML; MG/100ML
INJECTION, SOLUTION INTRAVENOUS CONTINUOUS PRN
Status: DISCONTINUED | OUTPATIENT
Start: 2024-02-01 | End: 2024-02-01

## 2024-02-01 RX ORDER — LIDOCAINE HYDROCHLORIDE 10 MG/ML
INJECTION, SOLUTION EPIDURAL; INFILTRATION; INTRACAUDAL; PERINEURAL AS NEEDED
Status: DISCONTINUED | OUTPATIENT
Start: 2024-02-01 | End: 2024-02-01

## 2024-02-01 RX ORDER — ONDANSETRON 2 MG/ML
4 INJECTION INTRAMUSCULAR; INTRAVENOUS ONCE AS NEEDED
Status: CANCELLED | OUTPATIENT
Start: 2024-02-01

## 2024-02-01 RX ORDER — SODIUM CHLORIDE, SODIUM LACTATE, POTASSIUM CHLORIDE, CALCIUM CHLORIDE 600; 310; 30; 20 MG/100ML; MG/100ML; MG/100ML; MG/100ML
125 INJECTION, SOLUTION INTRAVENOUS CONTINUOUS
Status: CANCELLED | OUTPATIENT
Start: 2024-02-01

## 2024-02-01 RX ORDER — SODIUM CHLORIDE, SODIUM LACTATE, POTASSIUM CHLORIDE, CALCIUM CHLORIDE 600; 310; 30; 20 MG/100ML; MG/100ML; MG/100ML; MG/100ML
20 INJECTION, SOLUTION INTRAVENOUS CONTINUOUS
Status: CANCELLED | OUTPATIENT
Start: 2024-02-01

## 2024-02-01 RX ADMIN — PROPOFOL 20 MG: 10 INJECTION, EMULSION INTRAVENOUS at 10:20

## 2024-02-01 RX ADMIN — PROPOFOL 140 MG: 10 INJECTION, EMULSION INTRAVENOUS at 10:16

## 2024-02-01 RX ADMIN — PROPOFOL 30 MG: 10 INJECTION, EMULSION INTRAVENOUS at 10:19

## 2024-02-01 RX ADMIN — LIDOCAINE HYDROCHLORIDE 100 MG: 10 INJECTION, SOLUTION EPIDURAL; INFILTRATION; INTRACAUDAL at 10:16

## 2024-02-01 RX ADMIN — SODIUM CHLORIDE, SODIUM LACTATE, POTASSIUM CHLORIDE, AND CALCIUM CHLORIDE: .6; .31; .03; .02 INJECTION, SOLUTION INTRAVENOUS at 10:02

## 2024-02-01 RX ADMIN — PROPOFOL 50 MG: 10 INJECTION, EMULSION INTRAVENOUS at 10:23

## 2024-02-01 RX ADMIN — PROPOFOL 50 MG: 10 INJECTION, EMULSION INTRAVENOUS at 10:26

## 2024-02-01 NOTE — ANESTHESIA POSTPROCEDURE EVALUATION
Post-Op Assessment Note    CV Status:  Stable  Pain Score: 0    Pain management: adequate       Mental Status:  Awake and alert   Hydration Status:  Stable   PONV Controlled:  None   Airway Patency:  Patent and adequate     Post Op Vitals Reviewed: Yes    No anethesia notable event occurred.    Staff: CRNA, Anesthesiologist               BP   122/64   Temp      Pulse  63   Resp   16   SpO2   100%

## 2024-02-01 NOTE — ANESTHESIA PROCEDURE NOTES
Anesthesia Notable Event    Date/Time: 2/1/2024 10:32 AM    Performed by: Rachel Roa CRNA  Authorized by: Destinee Awad MD

## 2024-02-01 NOTE — ANESTHESIA PREPROCEDURE EVALUATION
Procedure:  COLONOSCOPY    Relevant Problems   CARDIO   (+) Arteriosclerosis of coronary artery   (+) Mixed hyperlipidemia      ENDO   (+) Hypothyroidism      GI/HEPATIC   (+) GERD without esophagitis      HEMATOLOGY   (+) Iron deficiency anemia      NEURO/PSYCH   (+) Anxiety disorder   (+) Photopsia      Other   (+) History of placement of stent in LAD coronary artery        Physical Exam    Airway    Mallampati score: III  TM Distance: >3 FB  Neck ROM: full     Dental       Cardiovascular      Pulmonary      Other Findings  post-pubertal.      Anesthesia Plan  ASA Score- 3     Anesthesia Type- IV sedation with anesthesia with ASA Monitors.         Additional Monitors:     Airway Plan:            Plan Factors-    Chart reviewed. EKG reviewed.  Existing labs reviewed. Patient summary reviewed.                  Induction- intravenous.    Postoperative Plan-     Informed Consent- Anesthetic plan and risks discussed with patient.  I personally reviewed this patient with the CRNA. Discussed and agreed on the Anesthesia Plan with the CRNA..

## 2024-02-01 NOTE — H&P
History and Physical - [facility]  Shakira Trejo 68 y.o. female MRN: [id number]        HPI: 68-year-old female was referred for screening colonoscopy.  Regular bowel movements.    Historical Information   Past Medical History:   Diagnosis Date    Allergic     Anemia     Anxiety     Coronary artery disease     Disease of thyroid gland     Ganglion cyst of wrist, right     last assessed nov 4 2016    GERD (gastroesophageal reflux disease)     History of Lyme disease     Pneumonia     Presence of stent in LAD coronary artery     last assessed april 14 2017    Serum total bilirubin elevated 12/11/2020    Status post de Quervain's release surgery     last assessed dec 16 2016    Visual impairment      Past Surgical History:   Procedure Laterality Date    BREAST EXCISIONAL BIOPSY Left 1996    benign    BREAST SURGERY      CORONARY ANGIOPLASTY WITH STENT PLACEMENT      GANGLION CYST EXCISION Right 12/08/2016    Procedure: EXCISION GANGLION CYST right first dorsal extensor compartment;  Surgeon: Clifton Kan MD;  Location: [facility];  Service:     IA HALLUX RIGIDUS W/CHEILECTOMY 1ST MP JT W/O IMPLT Right 12/14/2021    Procedure: right index finger MUCOID CYST excision WITH CHEILECTOMY;  Surgeon: Clifton Kan MD;  Location: [facility];  Service: Orthopedics    IA INCISION EXTENSOR TENDON SHEATH WRIST Right 12/08/2016    Procedure: DEQUERVAINS RELEASE;  Surgeon: Clifton Kan MD;  Location: [facility];  Service: Orthopedics    TONSILLECTOMY       Social History   Social History     Substance and Sexual Activity   Alcohol Use No     Social History     Substance and Sexual Activity   Drug Use Never     Social History     Tobacco Use   Smoking Status Never   Smokeless Tobacco Never   Tobacco Comments    Never have inhaled any type of tobacco or anything else     Family History   Problem Relation Age of Onset    Heart disease Father     Cancer Father     Dementia Mother         Showed  symptoms around 92 years old    Hyperlipidemia Sister     Thyroid disease Sister     Stroke Brother         Mini stroke    Heart failure Maternal Grandmother     No Known Problems Maternal Grandfather     No Known Problems Paternal Grandmother     No Known Problems Paternal Grandfather     Breast cancer Paternal Aunt         unknown primary with mets to breast-unknown age    Cancer Paternal Aunt 68        hip-bone    No Known Problems Daughter     No Known Problems Daughter     No Known Problems Daughter     Anxiety disorder Sister     COPD Sister         Smoker since the age of 16    No Known Problems Maternal Aunt     No Known Problems Maternal Aunt     No Known Problems Maternal Aunt     No Known Problems Maternal Aunt     Lung cancer Paternal Aunt         heavy smoker-unknown age       Meds/Allergies     (Not in a hospital admission)      Allergies   Allergen Reactions    Augmentin [Amoxicillin-Pot Clavulanate] Diarrhea and Abdominal Pain     Patient was prescribed this medication again in 7/23 and was unable to tolerate.    Tetracaine      Other reaction(s): Other (See Comments)  Eye Irritation, Burning  Other reaction(s): Other (See Comments)  Eye Irritation, Burning    Benzalkonium Chloride Other (See Comments)     Eye redness and pain       Objective     Blood pressure 130/62, pulse (!) 18, temperature (!) 97.4 °F (36.3 °C), temperature source Tympanic, resp. rate 18, SpO2 99%, not currently breastfeeding.    ASSESSMENT:     Screening for colon cancer    PLAN:    Colonoscopy

## 2024-02-05 PROCEDURE — 88305 TISSUE EXAM BY PATHOLOGIST: CPT | Performed by: PATHOLOGY

## 2024-02-18 DIAGNOSIS — F41.9 ANXIETY DISORDER, UNSPECIFIED TYPE: ICD-10-CM

## 2024-02-19 RX ORDER — LORAZEPAM 0.5 MG/1
0.5 TABLET ORAL
Qty: 30 TABLET | Refills: 0 | Status: SHIPPED | OUTPATIENT
Start: 2024-02-19

## 2024-02-21 PROBLEM — Z00.00 WELL ADULT EXAM: Status: RESOLVED | Noted: 2023-05-21 | Resolved: 2024-02-21

## 2024-04-12 ENCOUNTER — HOSPITAL ENCOUNTER (OUTPATIENT)
Dept: MAMMOGRAPHY | Facility: CLINIC | Age: 69
Discharge: HOME | End: 2024-04-12
Payer: COMMERCIAL

## 2024-04-12 VITALS — WEIGHT: 173 LBS | BODY MASS INDEX: 29.53 KG/M2 | HEIGHT: 64 IN

## 2024-04-12 DIAGNOSIS — Z12.31 ENCOUNTER FOR SCREENING MAMMOGRAM FOR BREAST CANCER: ICD-10-CM

## 2024-04-12 PROCEDURE — 77067 SCR MAMMO BI INCL CAD: CPT

## 2024-04-12 PROCEDURE — 77063 BREAST TOMOSYNTHESIS BI: CPT

## 2024-04-15 ENCOUNTER — TELEPHONE (OUTPATIENT)
Dept: FAMILY MEDICINE CLINIC | Facility: CLINIC | Age: 69
End: 2024-04-15

## 2024-04-15 NOTE — TELEPHONE ENCOUNTER
----- Message from Alexandra Miranda DO sent at 4/15/2024  9:49 AM EDT -----  Please let the pt known that her mammogram was normal.  Repeat in 1 year.

## 2024-04-22 DIAGNOSIS — F41.9 ANXIETY DISORDER, UNSPECIFIED TYPE: ICD-10-CM

## 2024-04-23 RX ORDER — LORAZEPAM 0.5 MG/1
0.5 TABLET ORAL
Qty: 30 TABLET | Refills: 0 | Status: SHIPPED | OUTPATIENT
Start: 2024-04-23

## 2024-05-30 ENCOUNTER — TELEPHONE (OUTPATIENT)
Age: 69
End: 2024-05-30

## 2024-05-30 NOTE — TELEPHONE ENCOUNTER
Reason for call:   [x] Prior Auth  [] Other:     Caller:  [x] Patient  [] Pharmacy  Name:   Address:   Callback Number:     Medication: Crestor    Dose/Frequency: 20 mg    Quantity: 32    Ordering Provider:   [x] PCP/Provider -   [] Speciality/Provider -     Has the patient tried other medications and failed? If failed, which medications did they fail?    [x] No   [] Yes -     Is the patient's insurance updated in EPIC?   [x] Yes   [] No     Is a copy of the patient's insurance scanned in EPIC?   [x] Yes last scan 01/28/22  [] No

## 2024-06-02 NOTE — TELEPHONE ENCOUNTER
PA for Crestor    Submitted via    [x]CMM-KEY [id number]  []SurescriPinPay-Case ID #   []Faxed to plan   []Other website   []Phone call Case ID #     Office notes sent, clinical questions answered. Awaiting determination    Turnaround time for your insurance to make a decision on your Prior Authorization can take 7-21 business days.

## 2024-06-17 ENCOUNTER — TELEPHONE (OUTPATIENT)
Dept: FAMILY MEDICINE CLINIC | Facility: CLINIC | Age: 69
End: 2024-06-17

## 2024-06-17 NOTE — TELEPHONE ENCOUNTER
Can you please advise if this prior auth has been initiated?    Shakira galicia [facility] (supporting Alexandra Miranda DO)      6/17/24  7:34 AM  Good morning can you please give me any updates on getting my Crestor brand pre-authorized again for the next year. Has it been sent to [facility] yet. Thank you…..Syeda

## 2024-06-17 NOTE — TELEPHONE ENCOUNTER
Pt asking if there is any update on a Prior Auth for her Crestor.    Please advise. I did not see a telephone encounter regarding this medication.    Thank you!

## 2024-06-20 NOTE — TELEPHONE ENCOUNTER
Has there been any updates on patients Crestor prior auth.  She only uses brand and we auth this for her every year.    Please advise - thank you!

## 2024-06-27 ENCOUNTER — TELEPHONE (OUTPATIENT)
Age: 69
End: 2024-06-27

## 2024-06-27 NOTE — TELEPHONE ENCOUNTER
Duplicate Prior Auth request / encounter please see telephone encounter from 5/30/24 regarding Crestor PA. Please review patient's chart to see status of PA and to document anything regarding this medication in regards to anything regarding the authorization process etc before creating another encounter Thank You.

## 2024-06-28 DIAGNOSIS — E78.2 MIXED HYPERLIPIDEMIA: ICD-10-CM

## 2024-06-28 DIAGNOSIS — F41.9 ANXIETY DISORDER, UNSPECIFIED TYPE: ICD-10-CM

## 2024-06-28 RX ORDER — LORAZEPAM 0.5 MG/1
0.5 TABLET ORAL
Qty: 30 TABLET | Refills: 0 | Status: SHIPPED | OUTPATIENT
Start: 2024-06-28

## 2024-06-28 RX ORDER — ROSUVASTATIN CALCIUM 20 MG/1
20 TABLET, FILM COATED ORAL DAILY
Qty: 30 TABLET | Refills: 5 | Status: SHIPPED | OUTPATIENT
Start: 2024-06-28

## 2024-07-26 ENCOUNTER — OFFICE VISIT (OUTPATIENT)
Dept: FAMILY MEDICINE CLINIC | Facility: CLINIC | Age: 69
End: 2024-07-26
Payer: COMMERCIAL

## 2024-07-26 VITALS
BODY MASS INDEX: 24.59 KG/M2 | TEMPERATURE: 97.8 F | DIASTOLIC BLOOD PRESSURE: 68 MMHG | SYSTOLIC BLOOD PRESSURE: 110 MMHG | WEIGHT: 144 LBS | HEIGHT: 64 IN | OXYGEN SATURATION: 97 % | HEART RATE: 70 BPM

## 2024-07-26 DIAGNOSIS — Z20.2 HPV EXPOSURE: ICD-10-CM

## 2024-07-26 DIAGNOSIS — Z12.4 ENCOUNTER FOR PAPANICOLAOU SMEAR FOR CERVICAL CANCER SCREENING: Primary | ICD-10-CM

## 2024-07-26 PROBLEM — E78.41 ELEVATED LIPOPROTEIN(A): Chronic | Status: ACTIVE | Noted: 2024-05-23

## 2024-07-26 PROCEDURE — 1159F MED LIST DOCD IN RCRD: CPT | Performed by: NURSE PRACTITIONER

## 2024-07-26 PROCEDURE — 87624 HPV HI-RISK TYP POOLED RSLT: CPT | Performed by: NURSE PRACTITIONER

## 2024-07-26 PROCEDURE — 1101F PT FALLS ASSESS-DOCD LE1/YR: CPT | Performed by: NURSE PRACTITIONER

## 2024-07-26 PROCEDURE — G0145 SCR C/V CYTO,THINLAYER,RESCR: HCPCS | Performed by: NURSE PRACTITIONER

## 2024-07-26 PROCEDURE — 1160F RVW MEDS BY RX/DR IN RCRD: CPT | Performed by: NURSE PRACTITIONER

## 2024-07-26 PROCEDURE — 3288F FALL RISK ASSESSMENT DOCD: CPT | Performed by: NURSE PRACTITIONER

## 2024-07-26 PROCEDURE — 99397 PER PM REEVAL EST PAT 65+ YR: CPT | Performed by: NURSE PRACTITIONER
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
## 2024-07-30 LAB
HPV HR 12 DNA CVX QL NAA+PROBE: NEGATIVE
HPV16 DNA CVX QL NAA+PROBE: NEGATIVE
HPV18 DNA CVX QL NAA+PROBE: NEGATIVE

## 2024-08-01 LAB
LAB AP GYN PRIMARY INTERPRETATION: NORMAL
Lab: NORMAL

## 2024-08-02 ENCOUNTER — OFFICE VISIT (OUTPATIENT)
Dept: FAMILY MEDICINE CLINIC | Facility: CLINIC | Age: 69
End: 2024-08-02
Payer: COMMERCIAL

## 2024-08-02 VITALS
HEART RATE: 76 BPM | SYSTOLIC BLOOD PRESSURE: 122 MMHG | DIASTOLIC BLOOD PRESSURE: 60 MMHG | OXYGEN SATURATION: 98 % | BODY MASS INDEX: 24.07 KG/M2 | WEIGHT: 141 LBS | TEMPERATURE: 97 F | HEIGHT: 64 IN

## 2024-08-02 DIAGNOSIS — I25.10 ARTERIOSCLEROSIS OF CORONARY ARTERY: ICD-10-CM

## 2024-08-02 DIAGNOSIS — E78.2 MIXED HYPERLIPIDEMIA: ICD-10-CM

## 2024-08-02 DIAGNOSIS — D50.9 IRON DEFICIENCY ANEMIA, UNSPECIFIED IRON DEFICIENCY ANEMIA TYPE: ICD-10-CM

## 2024-08-02 DIAGNOSIS — F41.1 GENERALIZED ANXIETY DISORDER: ICD-10-CM

## 2024-08-02 DIAGNOSIS — E03.9 HYPOTHYROIDISM, UNSPECIFIED TYPE: Primary | ICD-10-CM

## 2024-08-02 PROCEDURE — 99214 OFFICE O/P EST MOD 30 MIN: CPT | Performed by: FAMILY MEDICINE

## 2024-08-02 RX ORDER — EVOLOCUMAB 140 MG/ML
140 INJECTION, SOLUTION SUBCUTANEOUS
COMMUNITY
Start: 2024-07-31

## 2024-08-02 NOTE — PROGRESS NOTES
Ambulatory Visit  Name: Shakira Trejo      : 1955      MRN: [id number]  Encounter Provider: Alexandra Miranda DO  Encounter Date: 2024   Encounter department: [facility]    Assessment & Plan   1. Hypothyroidism, unspecified type  Assessment & Plan:  Check tsh and continue on current synthroid 75mcg daily   Orders:  -     TSH, 3rd generation with Free T4 reflex; Future  -     TSH, 3rd generation with Free T4 reflex  2. Iron deficiency anemia, unspecified iron deficiency anemia type  Assessment & Plan:  Last h/h normal. Check cbc   Orders:  -     CBC and differential; Future  -     CBC and differential  3. Arteriosclerosis of coronary artery  4. Mixed hyperlipidemia  Assessment & Plan:  Continue crestor and follow up with lipid specialist at [place]. Reviewed recent blood work with patient. Intolerant to zetia. Getting authorization for repatha  5. Generalized anxiety disorder  Assessment & Plan:  Lorazepam as needed. Pt needs new part 2 medication agreement at next office visit       History of Present Illness     Pt is here with her  for a follow up on chronic conditions. Pt had a pap done and std testing. Reviewed with patient in office. Std testing negative including hpv but pap was inconclusive- after discussion with patient. Would like pap repeated. Feeling ok today, no pelvic pain, no vaginal discharge.   Pt seen by a lipid specialist at [facility] and getting authorization for repatha. Pt intolerant to zetia. Having repeat labs done through [facility].   Pt will need thyroid checked.         Review of Systems   Constitutional: Negative.  Negative for fatigue and fever.   HENT: Negative.     Eyes: Negative.    Respiratory: Negative.  Negative for cough.    Cardiovascular: Negative.    Gastrointestinal: Negative.    Endocrine: Negative.    Genitourinary: Negative.    Musculoskeletal: Negative.    Skin: Negative.    Allergic/Immunologic: Negative.    Neurological: Negative.   "  Psychiatric/Behavioral: Negative.       Current Outpatient Medications on File Prior to Visit   Medication Sig Dispense Refill    Acetylcysteine (NAC PO) Take by mouth 2 (two) times a day      aspirin 81 MG tablet Take 81 mg by mouth daily      atenolol (TENORMIN) 25 mg tablet TAKE ONE AND ONE-HALF TABLETS DAILY 135 tablet 3    Biotin 10 MG CAPS       cholecalciferol (VITAMIN D3) 1,000 units tablet Take by mouth      Crestor 20 MG tablet Take 1 tablet (20 mg total) by mouth daily Brand necessary 30 tablet 5    Cyanocobalamin (Vitamin B 12) 500 MCG TABS       Evolocumab (Repatha SureClick) 140 MG/ML SOAJ Inject 140 mg under the skin      LORazepam (ATIVAN) 0.5 mg tablet take 1 tablet by mouth once daily at bedtime 30 tablet 0    Lutein 10 MG TABS 40 mg       Mometasone Furoate POWD Compound Mometasone 1 mg capsule to be added to sinus rinse twice daily 180 g 5    nitroglycerin (Nitrostat) 0.4 mg SL tablet Place 1 tablet (0.4 mg total) under the tongue every 5 (five) minutes as needed for chest pain 30 tablet 0    Oxymetazoline HCl (NASAL SPRAY NA) 2 sprays by Each Nare route 2 (two) times a day Theophyline nasal spray      Synthroid 75 MCG tablet TAKE 1 TABLET DAILY FOR HYPOTHYROIDISM 90 tablet 3     No current facility-administered medications on file prior to visit.      Social History     Tobacco Use    Smoking status: Never    Smokeless tobacco: Never    Tobacco comments:     Never have inhaled any type of tobacco or anything else   Vaping Use    Vaping status: Never Used   Substance and Sexual Activity    Alcohol use: No    Drug use: Never    Sexual activity: Not Currently     Partners: Female     Birth control/protection: Post-menopausal     Comment: Never have taken any type of birth control medication     Objective     /60 (BP Location: Left arm, Patient Position: Sitting, Cuff Size: Adult)   Pulse 76   Temp (!) 97 °F (36.1 °C) (Tympanic)   Ht 5' 4\" (1.626 m)   Wt 64 kg (141 lb)   SpO2 98%   BMI " 24.20 kg/m²     Physical Exam  Vitals and nursing note reviewed.   Constitutional:       Appearance: She is well-developed.   HENT:      Head: Normocephalic and atraumatic.   Cardiovascular:      Rate and Rhythm: Normal rate and regular rhythm.      Heart sounds: Normal heart sounds.   Pulmonary:      Effort: Pulmonary effort is normal.      Breath sounds: Normal breath sounds.   Abdominal:      General: Bowel sounds are normal.      Palpations: Abdomen is soft.   Skin:     General: Skin is warm and dry.   Neurological:      Mental Status: She is alert and oriented to person, place, and time.   Psychiatric:         Behavior: Behavior normal.         Thought Content: Thought content normal.         Judgment: Judgment normal.       Administrative Statements

## 2024-08-02 NOTE — PATIENT INSTRUCTIONS
Blood work at [facility] - not fasting   Repeat pap  Keep follow up with lipid specialist

## 2024-08-03 PROBLEM — T46.6X5A: Chronic | Status: ACTIVE | Noted: 2024-07-31

## 2024-08-03 PROBLEM — R79.82 ELEVATED C-REACTIVE PROTEIN (CRP): Status: ACTIVE | Noted: 2024-07-31

## 2024-08-03 NOTE — ASSESSMENT & PLAN NOTE
Continue crestor and follow up with lipid specialist at [place]. Reviewed recent blood work with patient. Intolerant to zetia. Getting authorization for repatha

## 2024-08-12 ENCOUNTER — TELEPHONE (OUTPATIENT)
Dept: FAMILY MEDICINE CLINIC | Facility: CLINIC | Age: 69
End: 2024-08-12

## 2024-08-12 NOTE — TELEPHONE ENCOUNTER
Has a prior auth been started for patient's Crestor?  This has been requested a couple of times.  Patient uses brand only.    Please advise.  Thank you!

## 2024-08-14 NOTE — TELEPHONE ENCOUNTER
According to office note from 5/30/24- Crestor was approved until 7/1/2025. Patient was able to fill the medication in July. Can call pharmacy to verify no PA is required. It may be too soon to fill the medication for August.

## 2024-08-19 ENCOUNTER — OFFICE VISIT (OUTPATIENT)
Dept: FAMILY MEDICINE CLINIC | Facility: CLINIC | Age: 69
End: 2024-08-19
Payer: COMMERCIAL

## 2024-08-19 VITALS
WEIGHT: 142 LBS | TEMPERATURE: 96.2 F | DIASTOLIC BLOOD PRESSURE: 78 MMHG | BODY MASS INDEX: 24.24 KG/M2 | HEIGHT: 64 IN | SYSTOLIC BLOOD PRESSURE: 120 MMHG | HEART RATE: 73 BPM | OXYGEN SATURATION: 99 %

## 2024-08-19 DIAGNOSIS — R39.9 UTI SYMPTOMS: ICD-10-CM

## 2024-08-19 DIAGNOSIS — F41.9 ANXIETY DISORDER, UNSPECIFIED TYPE: ICD-10-CM

## 2024-08-19 DIAGNOSIS — Z20.2 POSSIBLE EXPOSURE TO STD: ICD-10-CM

## 2024-08-19 DIAGNOSIS — N39.0 URINARY TRACT INFECTION WITHOUT HEMATURIA, SITE UNSPECIFIED: Primary | ICD-10-CM

## 2024-08-19 LAB
SL AMB  POCT GLUCOSE, UA: NEGATIVE
SL AMB LEUKOCYTE ESTERASE,UA: ABNORMAL
SL AMB POCT BILIRUBIN,UA: NEGATIVE
SL AMB POCT BLOOD,UA: NEGATIVE
SL AMB POCT KETONES,UA: NEGATIVE
SL AMB POCT NITRITE,UA: NEGATIVE
SL AMB POCT PH,UA: 7
SL AMB POCT SPECIFIC GRAVITY,UA: 1.01
SL AMB POCT URINE PROTEIN: NEGATIVE
SL AMB POCT UROBILINOGEN: NEGATIVE

## 2024-08-19 PROCEDURE — 99214 OFFICE O/P EST MOD 30 MIN: CPT

## 2024-08-19 PROCEDURE — 81002 URINALYSIS NONAUTO W/O SCOPE: CPT

## 2024-08-19 RX ORDER — SULFAMETHOXAZOLE/TRIMETHOPRIM 800-160 MG
1 TABLET ORAL 2 TIMES DAILY
Qty: 6 TABLET | Refills: 0 | Status: SHIPPED | OUTPATIENT
Start: 2024-08-19 | End: 2024-08-22

## 2024-08-19 RX ORDER — NITROFURANTOIN 25; 75 MG/1; MG/1
100 CAPSULE ORAL 2 TIMES DAILY
Qty: 10 CAPSULE | Refills: 0 | Status: SHIPPED | OUTPATIENT
Start: 2024-08-19 | End: 2024-08-19 | Stop reason: CLARIF

## 2024-08-19 NOTE — ASSESSMENT & PLAN NOTE
-UA positive for small leukocytes     Plan:  Given dysuria, leuks on UA will treat for UTI,bactrim BID x3 days  Will send urine cx, adjust abx if needed based on results
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
## 2024-08-20 LAB
BV BACTERIA RRNA VAG QL NAA+PROBE: NEGATIVE
C GLABRATA RNA VAG QL NAA+PROBE: NOT DETECTED
C TRACH RRNA SPEC QL NAA+PROBE: NOT DETECTED
CANDIDA RRNA VAG QL PROBE: NOT DETECTED
N GONORRHOEA RRNA SPEC QL NAA+PROBE: NOT DETECTED
SPECIMEN SOURCE: NORMAL
T VAGINALIS RRNA SPEC QL NAA+PROBE: NOT DETECTED

## 2024-08-20 RX ORDER — LORAZEPAM 0.5 MG/1
0.5 TABLET ORAL
Qty: 30 TABLET | Refills: 0 | Status: SHIPPED | OUTPATIENT
Start: 2024-08-20

## 2024-08-26 ENCOUNTER — OFFICE VISIT (OUTPATIENT)
Dept: OBGYN CLINIC | Facility: CLINIC | Age: 69
End: 2024-08-26
Payer: COMMERCIAL

## 2024-08-26 VITALS — BODY MASS INDEX: 24.55 KG/M2 | SYSTOLIC BLOOD PRESSURE: 118 MMHG | DIASTOLIC BLOOD PRESSURE: 70 MMHG | WEIGHT: 143 LBS

## 2024-08-26 DIAGNOSIS — I25.10 ARTERIOSCLEROSIS OF CORONARY ARTERY: ICD-10-CM

## 2024-08-26 DIAGNOSIS — Z12.31 ENCOUNTER FOR SCREENING MAMMOGRAM FOR BREAST CANCER: ICD-10-CM

## 2024-08-26 DIAGNOSIS — Z11.3 SCREENING EXAMINATION FOR STD (SEXUALLY TRANSMITTED DISEASE): ICD-10-CM

## 2024-08-26 DIAGNOSIS — Z01.419 WOMEN'S ANNUAL ROUTINE GYNECOLOGICAL EXAMINATION: Primary | ICD-10-CM

## 2024-08-26 PROCEDURE — 99397 PER PM REEVAL EST PAT 65+ YR: CPT | Performed by: OBSTETRICS & GYNECOLOGY

## 2024-08-26 RX ORDER — NITROGLYCERIN 0.4 MG/1
0.4 TABLET SUBLINGUAL
Qty: 30 TABLET | Refills: 0 | Status: SHIPPED | OUTPATIENT
Start: 2024-08-26

## 2024-08-26 NOTE — PROGRESS NOTES
Ambulatory Visit  Name: Shakira Trejo      : 1955      MRN: [id number]  Encounter Provider: Shmuel Gilliland MD  Encounter Date: 2024   Encounter department: [facility]    Assessment & Plan   1. Encounter for screening mammogram for breast cancer  2. Women's annual routine gynecological examination  The patient was informed of a stable menopausal GYN examination.  A Pap smear was done.  A molecular panel was done.  We will screen for STDs.  She will continue getting yearly mammograms.  Her colonoscopy is up-to-date.  She feels safe at home.  She sees a dentist on a regular basis.  Does have a history of anxiety but is under control.    History of Present Illness     Shakira Trejo is a 68 y.o. female who presents for an annual GYN examination.  Last time she saw gynecologist was over 5 years ago.  She recently had a Pap smear which is unsatisfactory.  She is a  4 para 4 with 4 vaginal deliveries.  She is menopausal.  She still sexually active.  She is here today for yearly exam and STD screening.  Her  has admitted to numerous intimacy relationship with professional woman.  They believe he has a sick disease and counseling.  They are still .  She denies any symptomatology of vaginal discharge itching or burning.  I explained to the patient we will do a repeat Pap smear today and we will screen for GC and chlamydia.  Will order serology for hepatitis herpes HIV and syphilis.  She is happy with her weight.  Her colonoscopy is up-to-date.  She will continue getting yearly mammograms.  She denies any history of any kind of abdominal pelvic surgery.  All 4 of her deliveries were vaginal without any complications.  Review of family history is negative.  She she did recently lose an older brother who is 73 of a heart disease.  Minimal alcohol use.  Not a smoker.  Medication list reviewed.  She is taking low-dose aspirin and Crestor and Synthroid replacement.    Review of  Systems   Allergic/Immunologic: Immunocompromised state: rpr.   All other systems reviewed and are negative.      Objective     /70   Wt 64.9 kg (143 lb)   BMI 24.55 kg/m²     Physical Exam  Vitals reviewed. Exam conducted with a chaperone present.   Constitutional:       Appearance: Normal appearance. She is normal weight.   HENT:      Head: Normocephalic and atraumatic.      Nose: Nose normal.      Mouth/Throat:      Mouth: Mucous membranes are moist.   Eyes:      Extraocular Movements: Extraocular movements intact.      Pupils: Pupils are equal, round, and reactive to light.   Cardiovascular:      Rate and Rhythm: Normal rate and regular rhythm.      Pulses: Normal pulses.      Heart sounds: Normal heart sounds.   Pulmonary:      Effort: Pulmonary effort is normal.      Breath sounds: Normal breath sounds.   Chest:   Breasts:     Breasts are symmetrical.      Right: Normal.      Left: Normal.   Abdominal:      General: Abdomen is flat. Bowel sounds are normal.      Palpations: Abdomen is soft. There is no mass or pulsatile mass.      Tenderness: There is no abdominal tenderness.      Hernia: No hernia is present. There is no hernia in the left inguinal area or right inguinal area.   Genitourinary:     General: Normal vulva.      Exam position: Knee-chest position.      Pubic Area: No rash or pubic lice.       Labia:         Right: No rash, tenderness, lesion or injury.         Left: No rash, tenderness, lesion or injury.       Urethra: No prolapse, urethral pain, urethral swelling or urethral lesion.      Vagina: Normal. No signs of injury and foreign body. No vaginal discharge, erythema, tenderness, bleeding or lesions.      Cervix: Normal.      Uterus: Normal.       Adnexa: Right adnexa normal and left adnexa normal.      Rectum: Normal.      Comments: The external genitalia are within normal limits, the vagina is clean consistent with menopause.  The uterus is anterior normal size there is no evidence  of prolapse.  The adnexa clear bilaterally.  A Pap smear was performed.  A molecular panel was also done looking for GC and chlamydia.  We are going to do serology.  There was no obvious discharge or vaginal odor.  The urethra and bladder normal working relationship.    Musculoskeletal:         General: Normal range of motion.      Cervical back: Normal range of motion and neck supple.   Lymphadenopathy:      Upper Body:      Right upper body: No supraclavicular adenopathy.      Left upper body: No supraclavicular adenopathy.      Lower Body: No right inguinal adenopathy. No left inguinal adenopathy.   Skin:     General: Skin is warm.   Neurological:      General: No focal deficit present.      Mental Status: She is alert and oriented to person, place, and time.   Psychiatric:         Mood and Affect: Mood normal.         Behavior: Behavior normal.       Administrative Statements
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
## 2024-08-26 NOTE — TELEPHONE ENCOUNTER
Medication: nitroglycerin (Nitrostat) 0.4 mg SL tablet     Dose/Frequency: Place 1 tablet (0.4 mg total) under the tongue every 5 (five) minutes as needed for chest pain     Quantity: 30 tabs    Pharmacy: [id number] - [address]     Office:   [x] PCP/Provider - BOSSMAN BOLANOS, Masood Gutierrez MD   [] Speciality/Provider -     Does the patient have enough for 3 days?   [] Yes   [x] No - Send as HP to POD

## 2024-08-28 LAB
BV BACTERIA RRNA VAG QL NAA+PROBE: NEGATIVE
C GLABRATA RNA VAG QL NAA+PROBE: NOT DETECTED
C TRACH RRNA SPEC QL NAA+PROBE: NOT DETECTED
CANDIDA RRNA VAG QL PROBE: NOT DETECTED
CLINICAL INFO: NORMAL
CYTO CVX: NORMAL
CYTOLOGY CMNT CVX/VAG CYTO-IMP: NORMAL
DATE PREVIOUS BX: NORMAL
LMP START DATE: NORMAL
N GONORRHOEA RRNA SPEC QL NAA+PROBE: NOT DETECTED
SL AMB PREV. PAP:: NORMAL
SPECIMEN SOURCE CVX/VAG CYTO: NORMAL
T VAGINALIS RRNA SPEC QL NAA+PROBE: NOT DETECTED

## 2024-09-18 PROBLEM — N39.0 URINARY TRACT INFECTION WITHOUT HEMATURIA: Status: RESOLVED | Noted: 2024-08-19 | Resolved: 2024-09-18

## 2024-09-22 DIAGNOSIS — F41.9 ANXIETY DISORDER, UNSPECIFIED TYPE: ICD-10-CM

## 2024-09-23 RX ORDER — LORAZEPAM 0.5 MG/1
0.5 TABLET ORAL
Qty: 30 TABLET | Refills: 0 | Status: SHIPPED | OUTPATIENT
Start: 2024-09-23

## 2024-10-16 DIAGNOSIS — I25.10 ARTERIOSCLEROSIS OF CORONARY ARTERY: ICD-10-CM

## 2024-10-16 RX ORDER — ATENOLOL 25 MG/1
37.5 TABLET ORAL DAILY
Qty: 135 TABLET | Refills: 1 | Status: SHIPPED | OUTPATIENT
Start: 2024-10-16

## 2024-11-06 DIAGNOSIS — E03.9 HYPOTHYROIDISM, UNSPECIFIED TYPE: ICD-10-CM

## 2024-11-07 RX ORDER — LEVOTHYROXINE SODIUM 75 MCG
TABLET ORAL
Qty: 90 TABLET | Refills: 1 | Status: SHIPPED | OUTPATIENT
Start: 2024-11-07

## 2024-11-18 ENCOUNTER — OFFICE VISIT (OUTPATIENT)
Age: 69
End: 2024-11-18
Payer: COMMERCIAL

## 2024-11-18 ENCOUNTER — TELEPHONE (OUTPATIENT)
Age: 69
End: 2024-11-18

## 2024-11-18 ENCOUNTER — COSMETIC (OUTPATIENT)
Age: 69
End: 2024-11-18

## 2024-11-18 VITALS — BODY MASS INDEX: 24.41 KG/M2 | WEIGHT: 143 LBS | HEIGHT: 64 IN

## 2024-11-18 VITALS — WEIGHT: 143 LBS | BODY MASS INDEX: 24.41 KG/M2 | HEIGHT: 64 IN

## 2024-11-18 DIAGNOSIS — L82.1 SEBORRHEIC KERATOSIS: Primary | ICD-10-CM

## 2024-11-18 DIAGNOSIS — D18.01 CHERRY ANGIOMA: ICD-10-CM

## 2024-11-18 DIAGNOSIS — L84 CALLUS: ICD-10-CM

## 2024-11-18 DIAGNOSIS — D22.9 MULTIPLE MELANOCYTIC NEVI: ICD-10-CM

## 2024-11-18 DIAGNOSIS — L81.4 LENTIGO: Primary | ICD-10-CM

## 2024-11-18 DIAGNOSIS — L82.1 SEBORRHEIC KERATOSIS: ICD-10-CM

## 2024-11-18 PROCEDURE — 99203 OFFICE O/P NEW LOW 30 MIN: CPT | Performed by: DERMATOLOGY

## 2024-11-18 PROCEDURE — SKNTGDERM SKIN TAG DERM ADD ON: Performed by: DERMATOLOGY

## 2024-11-18 RX ORDER — TRETINOIN 0.25 MG/G
CREAM TOPICAL
Qty: 30 G | Refills: 3 | Status: SHIPPED | OUTPATIENT
Start: 2024-11-18

## 2024-11-18 NOTE — TELEPHONE ENCOUNTER
PA for Tretinoin 0.025% cream SUBMITTED to [facility]    via    []CMM-KEY:   [x]Surescripts-Case ID # [id number]   []Availity-Auth ID # NDC #   []Faxed to plan   []Other website   []Phone call Case ID #     [x]PA sent as URGENT    All office notes, labs and other pertaining documents and studies sent. Clinical questions answered. Awaiting determination from insurance company.     Turnaround time for your insurance to make a decision on your Prior Authorization can take 7-21 business days.

## 2024-11-18 NOTE — PROGRESS NOTES
"COSMETIC  NOTE     Patient Name: Shakira Trejo  Encounter Date: 11/18/2024           SEBORRHEIC KERATOSIS    Physical Exam:  Anatomic Location: Back, chest, bilateral legs  Morphologic Description:  Waxy \"stuck-on\" discrete papule  Any active signs of \"inflamed\" status:  NONE  Pertinent Positives:  Pertinent Negatives:    Additional History of Present Condition:    Patient would like to have SebK removed    Plan:  Reviewed that these lesions are NOT cancers and cannot harm a person directly.  Under Medicare guidelines, the removal of a seborrheic keratosis is NOT covered unless the specific lesion is of medical necessity (interferes with vision, hearing, breathing), or is symptomatic (bleeding, itching, infected, inflamed). Medicare does NOT cover removal simply if the lesions are unsightly. Medicare does cover the evaluation of any lesion to determine if a lesion is or is not cancerous (i.e. skin biopsy).  Discussed possibility of cosmetic removal, patient agrees with procedure. See procedure notes below       PROCEDURE:  DESTRUCTION OF BENIGN LESIONS WITH CRYOTHERAPY  After a thorough discussion of treatment options and risk/benefits/alternatives (including but not limited to local pain, scarring, dyspigmentation, blistering, and possible superinfection), verbal and written consent were obtained and the aforementioned lesions were treated on with cryotherapy using liquid nitrogen x 1 cycle for 5-10 seconds.    TOTAL NUMBER of 6 lesions were treated today on the ANATOMIC LOCATION: back, chest, bilateral llegs.    The patient tolerated the procedure well, and after-care instructions were provided.         Medical Complexity:    SELF-LIMITED OR MINOR PROBLEM.  Problem runs a definite and prescribed course, is transient in nature, and is not likely to permanently alter health status.        DO NOT BILL INSURANCE, PATIENT PAID $120 OUT OF POCKET FOR COSMETIC PROCEDURE        Scribe Attestation      I,:  Madisyn " MIRTHA Nicole am acting as a scribe while in the presence of the attending physician.:       I,:  Amy Jackson MD personally performed the services described in this documentation    as scribed in my presence.:

## 2024-11-18 NOTE — PROGRESS NOTES
"[facility] Note     Patient Name: Shakira Trejo  Encounter Date: 11/18/2024     Have you been cared for by a [facility]ologist in the last 3 years and, if so, which description applies to you?    NO.   I am considered a \"new\" patient and must complete all patient intake questions. I am FEMALE/of child-bearing potential.    REVIEW OF SYSTEMS:  Have you recently had or currently have any of the following? Recent fever or chills? No  Any non-healing wound? No  Are you pregnant or planning to become pregnant? No  Are you currently or planning to be nursing or breast feeding? No   PAST MEDICAL HISTORY:  Have you personally ever had or currently have any of the following?  If \"YES,\" then please provide more detail. Skin cancer (such as Melanoma, Basal Cell Carcinoma, Squamous Cell Carcinoma?  No  Tuberculosis, HIV/AIDS, Hepatitis B or C: No  Radiation Treatment No   HISTORY OF IMMUNOSUPPRESSION:   Do you have a history of any of the following:  Systemic Immunosuppression such as Diabetes, Biologic or Immunotherapy, Chemotherapy, Organ Transplantation, Bone Marrow Transplantation or Prednsione?  No    Answering \"YES\" requires the addition of the dotphrase \"IMMUNOSUPPRESSED\" as the first diagnosis of the patient's visit.   FAMILY HISTORY:  Any \"first degree relatives\" (parent, brother, sister, or child) with the following?    Skin Cancer, Pancreatic or Other Cancer? YES, Mother had skin cancer, patient unsure what kind   PATIENT EXPERIENCE:    Do you want the Dermatologist to perform a COMPLETE skin exam today including a clinical examination under the \"bra and underwear\" areas?  Yes  If necessary, do we have your permission to call and leave a detailed message on your Preferred Phone number that includes your specific medical information?  Yes      Allergies   Allergen Reactions    Augmentin [Amoxicillin-Pot Clavulanate] Diarrhea and Abdominal Pain     Patient was prescribed this medication again " "in 7/23 and was unable to tolerate.    Tetracaine      Other reaction(s): Other (See Comments)  Eye Irritation, Burning  Other reaction(s): Other (See Comments)  Eye Irritation, Burning    Benzalkonium Chloride Other (See Comments)     Eye redness and pain      Current Outpatient Medications:     Acetylcysteine (NAC PO), Take by mouth 2 (two) times a day, Disp: , Rfl:     atenolol (TENORMIN) 25 mg tablet, TAKE ONE AND ONE-HALF TABLETS DAILY, Disp: 135 tablet, Rfl: 1    Biotin 10 MG CAPS, , Disp: , Rfl:     cholecalciferol (VITAMIN D3) 1,000 units tablet, Take by mouth, Disp: , Rfl:     Crestor 20 MG tablet, Take 1 tablet (20 mg total) by mouth daily Brand necessary, Disp: 30 tablet, Rfl: 5    Cyanocobalamin (Vitamin B 12) 500 MCG TABS, , Disp: , Rfl:     LORazepam (ATIVAN) 0.5 mg tablet, take 1 tablet by mouth once daily at bedtime, Disp: 30 tablet, Rfl: 0    Lutein 10 MG TABS, 40 mg , Disp: , Rfl:     nitroglycerin (Nitrostat) 0.4 mg SL tablet, Place 1 tablet (0.4 mg total) under the tongue every 5 (five) minutes as needed for chest pain, Disp: 30 tablet, Rfl: 0    Oxymetazoline HCl (NASAL SPRAY NA), 2 sprays by Each Nare route 2 (two) times a day Theophyline nasal spray, Disp: , Rfl:     Synthroid 75 MCG tablet, TAKE 1 TABLET DAILY FOR HYPOTHYROIDISM, Disp: 90 tablet, Rfl: 1    aspirin 81 MG tablet, Take 81 mg by mouth daily, Disp: , Rfl:     Evolocumab (Repatha SureClick) 140 MG/ML SOAJ, Inject 140 mg under the skin (Patient not taking: Reported on 11/18/2024), Disp: , Rfl:     Mometasone Furoate POWD, Compound Mometasone 1 mg capsule to be added to sinus rinse twice daily (Patient not taking: Reported on 11/18/2024), Disp: 180 g, Rfl: 5          Whom besides the patient is providing clinical information about today's encounter?   NO ADDITIONAL HISTORIAN (patient alone provided history)    Physical Exam and Assessment/Plan by Diagnosis:      MULTIPLE  MELANOCYTIC NEVI (\"Moles\")    Physical Exam:  Anatomic " "Location Affected:   Mostly on sun-exposed areas of the trunk and extremities, and face  Morphological Description:  Scattered, 1-4mm round to ovoid, symmetrical-appearing, even bordered, skin colored to dark brown macules/papules, mostly in sun-exposed areas  Pertinent Positives:  Pertinent Negatives:    Additional History of Present Condition:  Patient wants spot removed she notes that it has been growing recently.     Assessment and Plan:  Based on a thorough discussion of this condition and the management approach to it (including a comprehensive discussion of the known risks, side effects and potential benefits of treatment), the patient (family) agrees to implement the following specific plan:  When outside we recommend using a wide brim hat, sunglasses, long sleeve and pants, sunscreen with SPF 30+ with reapplication every 2 hours, or SPF specific clothing   Benign, reassured  Annual skin check  Discussed with patient that moles present on her face can not be removed      Melanocytic Nevi  Melanocytic nevi (\"moles\") are tan or brown, raised or flat areas of the skin which have an increased number of melanocytes. Melanocytes are the cells in our body which make pigment and account for skin color.    Some moles are present at birth (I.e., \"congenital nevi\"), while others come up later in life (i.e., \"acquired nevi\").  The sun can stimulate the body to make more moles.  Sunburns are not the only thing that triggers more moles.  Chronic sun exposure can do it too.     Clinically distinguishing a healthy mole from melanoma may be difficult, even for experienced dermatologists. The \"ABCDE's\" of moles have been suggested as a means of helping to alert a person to a suspicious mole and the possible increased risk of melanoma.  The suggestions for raising alert are as follows:    Asymmetry: Healthy moles tend to be symmetric, while melanomas are often asymmetric.  Asymmetry means if you draw a line through the mole, " "the two halves do not match in color, size, shape, or surface texture. Asymmetry can be a result of rapid enlargement of a mole, the development of a raised area on a previously flat lesion, scaling, ulceration, bleeding or scabbing within the mole.  Any mole that starts to demonstrate \"asymmetry\" should be examined promptly by a board certified dermatologist.     Border: Healthy moles tend to have discrete, even borders.  The border of a melanoma often blends into the normal skin and does not sharply delineate the mole from normal skin.  Any mole that starts to demonstrate \"uneven borders\" should be examined promptly by a board certified dermatologist.     Color: Healthy moles tend to be one color throughout.  Melanomas tend to be made up of different colors ranging from dark black, blue, white, or red.  Any mole that demonstrates a color change should be examined promptly by a board certified dermatologist.     Diameter: Healthy moles tend to be smaller than 0.6 cm in size; an exception are \"congenital nevi\" that can be larger.  Melanomas tend to grow and can often be greater than 0.6 cm (1/4 of an inch, or the size of a pencil eraser). This is only a guideline, and many normal moles may be larger than 0.6 cm without being unhealthy.  Any mole that starts to change in size (small to bigger or bigger to smaller) should be examined promptly by a board certified dermatologist.     Evolving: Healthy moles tend to \"stay the same.\"  Melanomas may often show signs of change or evolution such as a change in size, shape, color, or elevation.  Any mole that starts to itch, bleed, crust, burn, hurt, or ulcerate or demonstrate a change or evolution should be examined promptly by a board certified dermatologist.        LENTIGO    Physical Exam:  Anatomic Location Affected:  trunk, arms, face  Morphological Description:  Light brown macules  Pertinent Positives:  Pertinent Negatives:    Additional History of Present Condition:  " Patient would like to have spot removed    Assessment and Plan:  Based on a thorough discussion of this condition and the management approach to it (including a comprehensive discussion of the known risks, side effects and potential benefits of treatment), the patient (family) agrees to implement the following specific plan:  When outside we recommend using a wide brim hat, sunglasses, long sleeve and pants, sunscreen with SPF 30+ with reapplication every 2 hours, or SPF specific clothing   Discussed cosmetic removal at [facility]  Start Tretinoin 0.025% compounded cream, apply twice a day on affected areas on the face       What is a lentigo?  A lentigo is a pigmented flat or slightly raised lesion with a clearly defined edge. Unlike an ephelis (freckle), it does not fade in the winter months. There are several kinds of lentigo.  The name lentigo originally referred to its appearance resembling a small lentil. The plural of lentigo is lentigines, although “lentigos” is also in common use.    Who gets lentigines?  Lentigines can affect males and females of all ages and races. Solar lentigines are especially prevalent in fair skinned adults. Lentigines associated with syndromes are present at birth or arise during childhood.    What causes lentigines?  Common forms of lentigo are due to exposure to ultraviolet radiation:  Sun damage including sunburn   Indoor tanning   Phototherapy, especially photochemotherapy (PUVA)    Ionizing radiation, eg radiation therapy, can also cause lentigines.  Several familial syndromes associated with widespread lentigines originate from mutations in Matt-MAP kinase, mTOR signaling and PTEN pathways.    What is the treatment for lentigines?  Most lentigines are left alone. Attempts to lighten them may not be successful. The following approaches are used:  SPF 50+ broad-spectrum sunscreen   Hydroquinone bleaching cream   Alpha hydroxy acids   Vitamin C   Retinoids   Azelaic acid  "  Chemical peels  Individual lesions can be permanently removed using:  Cryotherapy   Intense pulsed light   Pigment lasers    How can lentigines be prevented?  Lentigines associated with exposure ultraviolet radiation can be prevented by very careful sun protection. Clothing is more successful at preventing new lentigines than are sunscreens.    What is the outlook for lentigines?  Lentigines usually persist. They may increase in number with age and sun exposure. Some in sun-protected sites may fade and disappear.    VALLES ANGIOMAS    Physical Exam:  Anatomic Location Affected:  trunk and extremities   Morphological Description:  Scattered cherry red, 1-4 mm papules.  Pertinent Positives:  Pertinent Negatives:    Additional History of Present Condition:  present on exam    Assessment and Plan:  Based on a thorough discussion of this condition and the management approach to it (including a comprehensive discussion of the known risks, side effects and potential benefits of treatment), the patient (family) agrees to implement the following specific plan:  Monitor for changes  Benign, reassured      Assessment and Plan:    Cherry angioma, also known as Marcum de Rubio spots, are benign vascular skin lesions. A \"cherry angioma\" is a firm red, blue or purple papule, 0.1-1 cm in diameter. When thrombosed, they can appear black in colour until evaluated with a dermatoscope when the red or purple colour is more easily seen. Cherry angioma may develop on any part of the body but most often appear on the scalp, face, lips and trunk.  An angioma is due to proliferating endothelial cells; these are the cells that line the inside of a blood vessel.    Angiomas can arise in early life or later in life; the most common type of angioma is a cherry angioma.  Cherry angiomas are very common in males and females of any age or race. They are more noticeable in white skin than in skin of colour. They markedly increase in number from " "about the age of 40. There may be a family history of similar lesions. Eruptive cherry angiomas have been rarely reported to be associated with internal malignancy. The cause of angiomas is unknown. Genetic analysis of cherry angiomas has shown that they frequently carry specific somatic missense mutations in the GNAQ and GNA11 (Q209H) genes, which are involved in other vascular and melanocytic proliferations.      SEBORRHEIC KERATOSIS; NON-INFLAMED    Physical Exam:  Anatomic Location Affected:  trunk  Morphological Description:  Flat and raised, waxy, smooth to warty textured, yellow to brownish-grey to dark brown to blackish, discrete, \"stuck-on\" appearing papules.  Pertinent Positives:  Pertinent Negatives:    Additional History of Present Condition:  present on exam, patient would like to have them removed     Assessment and Plan:  Based on a thorough discussion of this condition and the management approach to it (including a comprehensive discussion of the known risks, side effects and potential benefits of treatment), the patient (family) agrees to implement the following specific plan:  Monitor for changes  Benign, reassured  Cryotherapy of 6 SKs today       Seborrheic Keratosis  A seborrheic keratosis is a harmless warty spot that appears during adult life as a common sign of skin aging.  Seborrheic keratoses can arise on any area of skin, covered or uncovered, with the usual exception of the palms and soles. They do not arise from mucous membranes. Seborrheic keratoses can have highly variable appearance.      Seborrheic keratoses are extremely common. It has been estimated that over 90% of adults over the age of 60 years have one or more of them. They occur in males and females of all races, typically beginning to erupt in the 30s or 40s. T today hey are uncommon under the age of 20 years.  The precise cause of seborrhoeic keratoses is not known.  Seborrhoeic keratoses are considered degenerative in " "nature. As time goes by, seborrheic keratoses tend to become more numerous. Some people inherit a tendency to develop a very large number of them; some people may have hundreds of them.      There is no easy way to remove multiple lesions on a single occasion.  Unless a specific lesion is \"inflamed\" and is causing pain or stinging/burning or is bleeding, most insurance companies do not authorize treatment.      PROCEDURE:  DESTRUCTION OF BENIGN LESIONS WITH CRYOTHERAPY  After a thorough discussion of treatment options and risk/benefits/alternatives (including but not limited to local pain, scarring, dyspigmentation, blistering, and possible superinfection), verbal and written consent were obtained and the aforementioned lesions were treated on with cryotherapy using liquid nitrogen x 1 cycle for 5-10 seconds.    TOTAL NUMBER of 6 lesions were treated today on the ANATOMIC LOCATION: abdomen    The patient tolerated the procedure well, and after-care instructions were provided.         CORN   Physical Exam:  Anatomic Location Affected:  left third digit   Morphological Description:  hyperkeratotic papule  Pertinent Positives:  Pertinent Negatives:    Additional History of Present Condition:  Present for >1 year    Assessment and Plan:  Based on a thorough discussion of this condition and the management approach to it (including a comprehensive discussion of the known risks, side effects and potential benefits of treatment), the patient (family) agrees to implement the following specific plan:  Recommend outpatient podiatry visit         Scribe Attestation      I,:  Madisyn Nicole MA am acting as a scribe while in the presence of the attending physician.:       I,:  Amy Jackson MD personally performed the services described in this documentation    as scribed in my presence.:           Gold Craig MD   PGY-2 Dermatology Resident    "

## 2024-11-26 DIAGNOSIS — E78.2 MIXED HYPERLIPIDEMIA: ICD-10-CM

## 2024-11-26 DIAGNOSIS — F41.9 ANXIETY DISORDER, UNSPECIFIED TYPE: ICD-10-CM

## 2024-11-26 RX ORDER — ROSUVASTATIN CALCIUM 20 MG/1
20 TABLET, FILM COATED ORAL DAILY
Qty: 30 TABLET | Refills: 5 | Status: SHIPPED | OUTPATIENT
Start: 2024-11-26

## 2024-11-27 RX ORDER — LORAZEPAM 0.5 MG/1
0.5 TABLET ORAL
Qty: 30 TABLET | Refills: 0 | Status: SHIPPED | OUTPATIENT
Start: 2024-11-27

## 2024-12-13 DIAGNOSIS — Z00.6 ENCOUNTER FOR EXAMINATION FOR NORMAL COMPARISON OR CONTROL IN CLINICAL RESEARCH PROGRAM: ICD-10-CM

## 2024-12-23 DIAGNOSIS — F41.9 ANXIETY DISORDER, UNSPECIFIED TYPE: ICD-10-CM

## 2024-12-24 RX ORDER — LORAZEPAM 0.5 MG/1
0.5 TABLET ORAL
Qty: 30 TABLET | Refills: 0 | Status: SHIPPED | OUTPATIENT
Start: 2024-12-24

## 2025-01-20 DIAGNOSIS — E78.2 MIXED HYPERLIPIDEMIA: ICD-10-CM

## 2025-01-20 RX ORDER — ROSUVASTATIN CALCIUM 20 MG/1
20 TABLET, FILM COATED ORAL DAILY
Qty: 32 TABLET | Refills: 5 | Status: SHIPPED | OUTPATIENT
Start: 2025-01-20

## 2025-01-22 DIAGNOSIS — D50.9 IRON DEFICIENCY ANEMIA, UNSPECIFIED IRON DEFICIENCY ANEMIA TYPE: Primary | ICD-10-CM

## 2025-01-22 DIAGNOSIS — E78.2 MIXED HYPERLIPIDEMIA: ICD-10-CM

## 2025-01-22 DIAGNOSIS — E03.9 HYPOTHYROIDISM, UNSPECIFIED TYPE: ICD-10-CM

## 2025-02-28 DIAGNOSIS — F41.9 ANXIETY DISORDER, UNSPECIFIED TYPE: ICD-10-CM

## 2025-02-28 RX ORDER — LORAZEPAM 0.5 MG/1
0.5 TABLET ORAL
Qty: 30 TABLET | Refills: 0 | Status: SHIPPED | OUTPATIENT
Start: 2025-02-28

## 2025-03-26 DIAGNOSIS — F41.9 ANXIETY DISORDER, UNSPECIFIED TYPE: ICD-10-CM

## 2025-03-27 RX ORDER — LORAZEPAM 0.5 MG/1
0.5 TABLET ORAL
Qty: 30 TABLET | Refills: 0 | Status: SHIPPED | OUTPATIENT
Start: 2025-03-27

## 2025-03-30 ENCOUNTER — OFFICE VISIT (OUTPATIENT)
Dept: URGENT CARE | Facility: CLINIC | Age: 70
End: 2025-03-30
Payer: COMMERCIAL

## 2025-03-30 VITALS
BODY MASS INDEX: 22.83 KG/M2 | HEART RATE: 64 BPM | OXYGEN SATURATION: 98 % | TEMPERATURE: 98.7 F | SYSTOLIC BLOOD PRESSURE: 147 MMHG | RESPIRATION RATE: 18 BRPM | WEIGHT: 133 LBS | DIASTOLIC BLOOD PRESSURE: 67 MMHG

## 2025-03-30 DIAGNOSIS — B34.9 ACUTE VIRAL SYNDROME: Primary | ICD-10-CM

## 2025-03-30 PROCEDURE — 99203 OFFICE O/P NEW LOW 30 MIN: CPT

## 2025-03-30 NOTE — PATIENT INSTRUCTIONS
Saline nasal spray as often as needed  Flonase nasal spray 1 spray to each nostril  Mucinex in the blue box for congestion    Your lungs are clear in the office today oxygen saturations are 99%  Rest and increase your fluids    Tylenol or motrin for headaches    Follow up with PCP if not improving

## 2025-03-30 NOTE — PROGRESS NOTES
[facility]        NAME: Shakira Trejo is a 69 y.o. female  : 1955    MRN: [id number]  DATE: 2025  TIME: 8:37 AM    Assessment and Plan   Acute viral syndrome [B34.9]  1. Acute viral syndrome              Patient Instructions   Saline nasal spray as often as needed  Flonase nasal spray 1 spray to each nostril  Mucinex in the blue box for congestion    Your lungs are clear in the office today oxygen saturations are 99%  Rest and increase your fluids    Tylenol or motrin for headaches    Follow up with PCP if not improving    Follow up with PCP in 3-5 days.  Proceed to  ER if symptoms worsen.    If tests have been performed at [facility], our office will contact you with results if changes need to be made to the care plan discussed with you at the visit.  [facility]    Chief Complaint     Chief Complaint   Patient presents with    Cough     Patient positive for flu A since Tuesday. Cough lingering.          History of Present Illness       This is a 69-year-old female who presents today with cough.  She is concerned that she may have pneumonia.  She states she tested positive for influenza on Wednesday.  She states her symptoms have improved she no longer has fatigue congestion is slightly better but still has a lingering cough and is concerned about her lungs.  Oxygen saturations were 99% lungs were clear.  Patient is concerned about watching her grandchildren and if she can because she is not feeling well.  I did explain that she should wait 1 more day before exposing herself to other people.  Her  is also sick.  She denies any fever chills shortness of breath.  She has not been taking anything for her symptoms.  She states her and her  both tested positive for influenza A.    Cough  Associated symptoms include headaches, postnasal drip and a sore throat. Pertinent negatives include no chest pain, chills, fever, shortness of breath  or wheezing.       Review of Systems   Review of Systems   Constitutional:  Positive for fatigue. Negative for chills and fever.   HENT:  Positive for congestion, postnasal drip, sinus pressure, sinus pain and sore throat.    Respiratory:  Positive for cough. Negative for shortness of breath, wheezing and stridor.    Cardiovascular:  Negative for chest pain, palpitations and leg swelling.   Gastrointestinal: Negative.    Genitourinary: Negative.    Neurological:  Positive for headaches.         Current Medications       Current Outpatient Medications:     Acetylcysteine (NAC PO), Take by mouth 2 (two) times a day, Disp: , Rfl:     aspirin 81 MG tablet, Take 81 mg by mouth daily, Disp: , Rfl:     atenolol (TENORMIN) 25 mg tablet, TAKE ONE AND ONE-HALF TABLETS DAILY, Disp: 135 tablet, Rfl: 1    Biotin 10 MG CAPS, , Disp: , Rfl:     cholecalciferol (VITAMIN D3) 1,000 units tablet, Take by mouth, Disp: , Rfl:     Crestor 20 MG tablet, Take 1 tablet (20 mg total) by mouth daily Brand necessary, Disp: 32 tablet, Rfl: 5    Cyanocobalamin (Vitamin B 12) 500 MCG TABS, , Disp: , Rfl:     Evolocumab (Repatha SureClick) 140 MG/ML SOAJ, Inject 140 mg under the skin (Patient not taking: Reported on 8/26/2024), Disp: , Rfl:     LORazepam (ATIVAN) 0.5 mg tablet, take 1 tablet by mouth at bedtime, Disp: 30 tablet, Rfl: 0    Lutein 10 MG TABS, 40 mg , Disp: , Rfl:     Mometasone Furoate POWD, Compound Mometasone 1 mg capsule to be added to sinus rinse twice daily (Patient not taking: Reported on 8/26/2024), Disp: 180 g, Rfl: 5    nitroglycerin (Nitrostat) 0.4 mg SL tablet, Place 1 tablet (0.4 mg total) under the tongue every 5 (five) minutes as needed for chest pain, Disp: 30 tablet, Rfl: 0    Oxymetazoline HCl (NASAL SPRAY NA), 2 sprays by Each Nare route 2 (two) times a day Theophyline nasal spray, Disp: , Rfl:     Synthroid 75 MCG tablet, TAKE 1 TABLET DAILY FOR HYPOTHYROIDISM, Disp: 90 tablet, Rfl: 1    tretinoin (RETIN-A) 0.025  % cream, Apply cream twice a day., Disp: 30 g, Rfl: 3    Current Allergies     Allergies as of 03/30/2025 - Reviewed 11/18/2024   Allergen Reaction Noted    Augmentin [amoxicillin-pot clavulanate] Diarrhea and Abdominal Pain 09/25/2017    Tetracaine  11/30/2016    Benzalkonium chloride Other (See Comments) 04/23/2017            The following portions of the patient's history were reviewed and updated as appropriate: allergies, current medications, past family history, past medical history, past social history, past surgical history and problem list.     Past Medical History:   Diagnosis Date    Allergic     Anemia     Anxiety     Coronary artery disease     Disease of thyroid gland     Ganglion cyst of wrist, right     last assessed nov 4 2016    GERD (gastroesophageal reflux disease)     History of Lyme disease     Pneumonia     Presence of stent in LAD coronary artery     last assessed april 14 2017    Serum total bilirubin elevated 12/11/2020    Status post de Quervain's release surgery     last assessed dec 16 2016    Visual impairment        Past Surgical History:   Procedure Laterality Date    BREAST EXCISIONAL BIOPSY Left 1996    benign    BREAST SURGERY      CORONARY ANGIOPLASTY WITH STENT PLACEMENT      GANGLION CYST EXCISION Right 12/08/2016    Procedure: EXCISION GANGLION CYST right first dorsal extensor compartment;  Surgeon: Clifton Kan MD;  Location: [facility];  Service:     WI HALLUX RIGIDUS W/CHEILECTOMY 1ST MP JT W/O IMPLT Right 12/14/2021    Procedure: right index finger MUCOID CYST excision WITH CHEILECTOMY;  Surgeon: Clifton Kan MD;  Location: [facility];  Service: Orthopedics    WI INCISION EXTENSOR TENDON SHEATH WRIST Right 12/08/2016    Procedure: DEQUERVAINS RELEASE;  Surgeon: Clifton Kan MD;  Location: [facility];  Service: Orthopedics    TONSILLECTOMY         Family History   Problem Relation Age of Onset    Dementia Mother         Showed symptoms around 92 years old     Heart disease Father     Cancer Father     Hyperlipidemia Sister     Thyroid disease Sister     Anxiety disorder Sister     No Known Problems Sister     Stroke Brother         Mini stroke    No Known Problems Daughter     No Known Problems Daughter     No Known Problems Daughter     Heart failure Maternal Grandmother     No Known Problems Maternal Grandfather     No Known Problems Paternal Grandmother     No Known Problems Paternal Grandfather     No Known Problems Maternal Aunt     No Known Problems Maternal Aunt     No Known Problems Maternal Aunt     No Known Problems Maternal Aunt     Breast cancer Paternal Aunt         unknown primary with mets to breast-unknown age    Cancer Paternal Aunt 68        hip-bone    Lung cancer Paternal Aunt         heavy smoker-unknown age    COPD Sister         Smoker since the age of 16         Medications have been verified.        Objective   /67   Pulse 64   Temp 98.7 °F (37.1 °C)   Resp 18   Wt 60.3 kg (133 lb)   SpO2 97%   BMI 22.83 kg/m²   No LMP recorded. Patient is postmenopausal.       Physical Exam     Physical Exam  HENT:      Head: Normocephalic and atraumatic.      Right Ear: Tympanic membrane, ear canal and external ear normal.      Left Ear: Tympanic membrane, ear canal and external ear normal.      Nose: Nose normal.      Mouth/Throat:      Mouth: Mucous membranes are moist.      Pharynx: Oropharynx is clear.   Eyes:      Conjunctiva/sclera: Conjunctivae normal.      Pupils: Pupils are equal, round, and reactive to light.   Cardiovascular:      Rate and Rhythm: Normal rate and regular rhythm.      Pulses: Normal pulses.      Heart sounds: Normal heart sounds.   Pulmonary:      Effort: Pulmonary effort is normal. No respiratory distress.      Breath sounds: Normal breath sounds. No wheezing, rhonchi or rales.   Abdominal:      General: Abdomen is flat. Bowel sounds are normal.   Musculoskeletal:         General: Normal range of motion.   Skin:      General: Skin is warm and dry.      Capillary Refill: Capillary refill takes less than 2 seconds.   Neurological:      General: No focal deficit present.      Mental Status: She is alert and oriented to person, place, and time.   Psychiatric:         Mood and Affect: Mood normal.         Thought Content: Thought content normal.         Judgment: Judgment normal.

## 2025-04-01 ENCOUNTER — APPOINTMENT (OUTPATIENT)
Dept: LAB | Facility: CLINIC | Age: 70
End: 2025-04-01
Payer: COMMERCIAL

## 2025-04-01 DIAGNOSIS — Z20.2 POSSIBLE EXPOSURE TO STD: ICD-10-CM

## 2025-04-01 DIAGNOSIS — D50.9 IRON DEFICIENCY ANEMIA, UNSPECIFIED IRON DEFICIENCY ANEMIA TYPE: ICD-10-CM

## 2025-04-01 DIAGNOSIS — E78.2 MIXED HYPERLIPIDEMIA: ICD-10-CM

## 2025-04-01 DIAGNOSIS — E03.9 HYPOTHYROIDISM, UNSPECIFIED TYPE: ICD-10-CM

## 2025-04-01 DIAGNOSIS — Z11.3 SCREENING EXAMINATION FOR STD (SEXUALLY TRANSMITTED DISEASE): ICD-10-CM

## 2025-04-01 DIAGNOSIS — R43.8 HYPOSMIA: ICD-10-CM

## 2025-04-01 DIAGNOSIS — Z00.6 ENCOUNTER FOR EXAMINATION FOR NORMAL COMPARISON OR CONTROL IN CLINICAL RESEARCH PROGRAM: ICD-10-CM

## 2025-04-01 LAB
ALBUMIN SERPL BCG-MCNC: 4.1 G/DL (ref 3.5–5)
ALP SERPL-CCNC: 44 U/L (ref 34–104)
ALT SERPL W P-5'-P-CCNC: 17 U/L (ref 7–52)
ANION GAP SERPL CALCULATED.3IONS-SCNC: 5 MMOL/L (ref 4–13)
AST SERPL W P-5'-P-CCNC: 17 U/L (ref 13–39)
BASOPHILS # BLD AUTO: 0.02 THOUSANDS/ÂΜL (ref 0–0.1)
BASOPHILS NFR BLD AUTO: 1 % (ref 0–1)
BILIRUB SERPL-MCNC: 0.73 MG/DL (ref 0.2–1)
BUN SERPL-MCNC: 18 MG/DL (ref 5–25)
CALCIUM SERPL-MCNC: 9.6 MG/DL (ref 8.4–10.2)
CHLORIDE SERPL-SCNC: 106 MMOL/L (ref 96–108)
CHOLEST SERPL-MCNC: 91 MG/DL (ref ?–200)
CO2 SERPL-SCNC: 30 MMOL/L (ref 21–32)
CREAT SERPL-MCNC: 0.74 MG/DL (ref 0.6–1.3)
EOSINOPHIL # BLD AUTO: 0.09 THOUSAND/ÂΜL (ref 0–0.61)
EOSINOPHIL NFR BLD AUTO: 2 % (ref 0–6)
ERYTHROCYTE [DISTWIDTH] IN BLOOD BY AUTOMATED COUNT: 13.2 % (ref 11.6–15.1)
GFR SERPL CREATININE-BSD FRML MDRD: 82 ML/MIN/1.73SQ M
GLUCOSE P FAST SERPL-MCNC: 88 MG/DL (ref 65–99)
HAV IGM SER QL: NORMAL
HBV CORE IGM SER QL: NORMAL
HBV SURFACE AG SER QL: NORMAL
HCT VFR BLD AUTO: 42.8 % (ref 34.8–46.1)
HCV AB SER QL: NORMAL
HDLC SERPL-MCNC: 56 MG/DL
HGB BLD-MCNC: 13.9 G/DL (ref 11.5–15.4)
HIV 1+2 AB+HIV1 P24 AG SERPL QL IA: NORMAL
IMM GRANULOCYTES # BLD AUTO: 0 THOUSAND/UL (ref 0–0.2)
IMM GRANULOCYTES NFR BLD AUTO: 0 % (ref 0–2)
LDLC SERPL CALC-MCNC: 25 MG/DL (ref 0–100)
LYMPHOCYTES # BLD AUTO: 2.45 THOUSANDS/ÂΜL (ref 0.6–4.47)
LYMPHOCYTES NFR BLD AUTO: 62 % (ref 14–44)
MCH RBC QN AUTO: 28.5 PG (ref 26.8–34.3)
MCHC RBC AUTO-ENTMCNC: 32.5 G/DL (ref 31.4–37.4)
MCV RBC AUTO: 88 FL (ref 82–98)
MONOCYTES # BLD AUTO: 0.36 THOUSAND/ÂΜL (ref 0.17–1.22)
MONOCYTES NFR BLD AUTO: 9 % (ref 4–12)
NEUTROPHILS # BLD AUTO: 1 THOUSANDS/ÂΜL (ref 1.85–7.62)
NEUTS SEG NFR BLD AUTO: 26 % (ref 43–75)
NONHDLC SERPL-MCNC: 35 MG/DL
NRBC BLD AUTO-RTO: 0 /100 WBCS
PLATELET # BLD AUTO: 210 THOUSANDS/UL (ref 149–390)
PMV BLD AUTO: 10.2 FL (ref 8.9–12.7)
POTASSIUM SERPL-SCNC: 3.9 MMOL/L (ref 3.5–5.3)
PROT SERPL-MCNC: 6.6 G/DL (ref 6.4–8.4)
RBC # BLD AUTO: 4.88 MILLION/UL (ref 3.81–5.12)
SODIUM SERPL-SCNC: 141 MMOL/L (ref 135–147)
TREPONEMA PALLIDUM IGG+IGM AB [PRESENCE] IN SERUM OR PLASMA BY IMMUNOASSAY: NORMAL
TRIGL SERPL-MCNC: 50 MG/DL (ref ?–150)
TSH SERPL DL<=0.05 MIU/L-ACNC: 2.25 UIU/ML (ref 0.45–4.5)
WBC # BLD AUTO: 3.92 THOUSAND/UL (ref 4.31–10.16)

## 2025-04-01 PROCEDURE — 80061 LIPID PANEL: CPT

## 2025-04-01 PROCEDURE — 87389 HIV-1 AG W/HIV-1&-2 AB AG IA: CPT

## 2025-04-01 PROCEDURE — 36415 COLL VENOUS BLD VENIPUNCTURE: CPT

## 2025-04-01 PROCEDURE — 86780 TREPONEMA PALLIDUM: CPT

## 2025-04-01 PROCEDURE — 80053 COMPREHEN METABOLIC PANEL: CPT

## 2025-04-01 PROCEDURE — 84443 ASSAY THYROID STIM HORMONE: CPT

## 2025-04-01 PROCEDURE — 85025 COMPLETE CBC W/AUTO DIFF WBC: CPT

## 2025-04-02 ENCOUNTER — RESULTS FOLLOW-UP (OUTPATIENT)
Dept: FAMILY MEDICINE CLINIC | Facility: CLINIC | Age: 70
End: 2025-04-02

## 2025-04-02 LAB
HSV2 IGG SERPL QL IA: NEGATIVE
HSV2 IGG SERPL QL IA: POSITIVE

## 2025-04-03 NOTE — TELEPHONE ENCOUNTER
----- Message from Aure Peña DO sent at 4/2/2025 11:43 AM EDT -----  Silvino Rosenberg,   Your labwork is negative for HIV, syphilis, Hep C, and HSV-2, which is the strain of herpes that causes genital sores. 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 HSV-1 is very common.  If you ever develop a cold sore we can send in an antiviral for you. Please feel free to reach out to our office with any questions or concerns.  -

## 2025-04-04 ENCOUNTER — OFFICE VISIT (OUTPATIENT)
Dept: FAMILY MEDICINE CLINIC | Facility: CLINIC | Age: 70
End: 2025-04-04
Payer: COMMERCIAL

## 2025-04-04 VITALS
DIASTOLIC BLOOD PRESSURE: 74 MMHG | BODY MASS INDEX: 22.31 KG/M2 | WEIGHT: 130 LBS | OXYGEN SATURATION: 98 % | HEART RATE: 72 BPM | TEMPERATURE: 98.7 F | SYSTOLIC BLOOD PRESSURE: 126 MMHG

## 2025-04-04 DIAGNOSIS — I25.118 CORONARY ARTERY DISEASE OF NATIVE ARTERY OF NATIVE HEART WITH STABLE ANGINA PECTORIS (HCC): ICD-10-CM

## 2025-04-04 DIAGNOSIS — E03.9 HYPOTHYROIDISM, UNSPECIFIED TYPE: ICD-10-CM

## 2025-04-04 DIAGNOSIS — Z13.1 SCREENING FOR DIABETES MELLITUS: ICD-10-CM

## 2025-04-04 DIAGNOSIS — F41.1 GENERALIZED ANXIETY DISORDER: ICD-10-CM

## 2025-04-04 DIAGNOSIS — E78.2 MIXED HYPERLIPIDEMIA: ICD-10-CM

## 2025-04-04 DIAGNOSIS — D72.819 LEUKOPENIA, UNSPECIFIED TYPE: ICD-10-CM

## 2025-04-04 DIAGNOSIS — R43.2 LOSS OF TASTE: ICD-10-CM

## 2025-04-04 DIAGNOSIS — E78.41 ELEVATED LIPOPROTEIN(A): Chronic | ICD-10-CM

## 2025-04-04 DIAGNOSIS — I25.10 ARTERIOSCLEROSIS OF CORONARY ARTERY: ICD-10-CM

## 2025-04-04 DIAGNOSIS — Z00.00 ANNUAL PHYSICAL EXAM: Primary | ICD-10-CM

## 2025-04-04 DIAGNOSIS — R43.0 LOSS OF SENSE OF SMELL: ICD-10-CM

## 2025-04-04 PROBLEM — D50.9 IRON DEFICIENCY ANEMIA: Status: RESOLVED | Noted: 2022-10-27 | Resolved: 2025-04-04

## 2025-04-04 PROBLEM — J10.1 INFLUENZA A: Status: ACTIVE | Noted: 2025-03-26

## 2025-04-04 PROCEDURE — 99397 PER PM REEVAL EST PAT 65+ YR: CPT | Performed by: FAMILY MEDICINE

## 2025-04-04 RX ORDER — LEVOTHYROXINE SODIUM 75 MCG
75 TABLET ORAL
Qty: 90 TABLET | Refills: 1 | Status: SHIPPED | OUTPATIENT
Start: 2025-04-04

## 2025-04-04 RX ORDER — ATENOLOL 25 MG/1
37.5 TABLET ORAL DAILY
Qty: 135 TABLET | Refills: 3 | Status: SHIPPED | OUTPATIENT
Start: 2025-04-04

## 2025-04-05 PROBLEM — B34.9 ACUTE VIRAL SYNDROME: Status: RESOLVED | Noted: 2025-03-30 | Resolved: 2025-04-05

## 2025-04-05 RX ORDER — EVOLOCUMAB 140 MG/ML
140 INJECTION, SOLUTION SUBCUTANEOUS
COMMUNITY
Start: 2025-04-05 | End: 2025-04-05

## 2025-04-06 NOTE — PROGRESS NOTES
ADULT ANNUAL PHYSICAL  [facility]    NAME: Shakira Trejo  AGE: 69 y.o. SEX: female  : 1955     DATE: 2025     Assessment and Plan:     1. Annual physical exam  2. Mixed hyperlipidemia  -     Lipid Panel with Direct LDL reflex; Future; Expected date: 10/01/2025  -     Comprehensive metabolic panel; Future; Expected date: 10/01/2025  3. Coronary artery disease of native artery of native heart with stable angina pectoris (HCC)  Assessment & Plan:  Follows with cardiology, cholesterol with good control on repatha and crestor  Orders:  -     Lipid Panel with Direct LDL reflex; Future; Expected date: 10/01/2025  -     Comprehensive metabolic panel; Future; Expected date: 10/01/2025  4. Arteriosclerosis of coronary artery  Assessment & Plan:  Follows with cardiology, cholesterol with good control on repatha and crestor  Orders:  -     atenolol (TENORMIN) 25 mg tablet; Take 1.5 tablets (37.5 mg total) by mouth daily  5. Generalized anxiety disorder  Assessment & Plan:  New medication agreement today. Pdmp reviewed regularly. Lorazepam as needed  6. Hypothyroidism, unspecified type  -     Synthroid 75 MCG tablet; Take 1 tablet (75 mcg total) by mouth daily in the early morning  7. Leukopenia, unspecified type  -     CBC and differential; Future; Expected date: 10/01/2025  8. Elevated lipoprotein(a)  -     Lipid Panel with Direct LDL reflex; Future; Expected date: 10/01/2025  -     Comprehensive metabolic panel; Future; Expected date: 10/01/2025  9. Loss of sense of smell  Assessment & Plan:  Seen by ENT, has a mri of brain scheduled  10. Loss of taste  Assessment & Plan:  Seen by ent, has mri brain scheduled    11. Screening for diabetes mellitus  -     Hemoglobin A1C; Future; Expected date: 10/01/2025      Immunizations and preventive care screenings were discussed with patient today. Appropriate education was printed on patient's after visit  summary.    Counseling:  Dental Health: discussed importance of regular tooth brushing, flossing, and dental visits.  Exercise: the importance of regular exercise/physical activity was discussed. Recommend exercise 3-5 times per week for at least 30 minutes.       Depression Screening and Follow-up Plan: Patient was screened for depression during today's encounter. They screened negative with a PHQ-2 score of 0.          Return in about 6 months (around 10/4/2025).     Chief Complaint:     Chief Complaint   Patient presents with    Follow-up     6 months. Cough that lingers. Wants left pinky finger checked out due to swelling.      History of Present Illness:     Pt is here with her  today. Recently had influenza A about 2 weeks ago and still with residual cough. Had influenza vaccine this year. Had recent blood work and reviewed with patient in office today. No chest pain or shortness of breath. Has been exercising regularly.           Review of Systems:     Review of Systems   Constitutional: Negative.  Negative for fatigue and fever.   HENT: Negative.     Eyes: Negative.    Respiratory:  Positive for cough. Negative for shortness of breath.    Cardiovascular: Negative.    Gastrointestinal: Negative.    Endocrine: Negative.    Genitourinary: Negative.    Musculoskeletal: Negative.    Skin: Negative.    Allergic/Immunologic: Negative.    Neurological: Negative.    Psychiatric/Behavioral: Negative.        Past Medical History:     Past Medical History:   Diagnosis Date    Allergic     Anemia     Anxiety     Coronary artery disease     Disease of thyroid gland     Ganglion cyst of wrist, right     last assessed nov 4 2016    GERD (gastroesophageal reflux disease)     History of Lyme disease     Pneumonia     Presence of stent in LAD coronary artery     last assessed april 14 2017    Serum total bilirubin elevated 12/11/2020    Status post de Quervain's release surgery     last assessed dec 16 2016    Visual  impairment       Past Surgical History:     Past Surgical History:   Procedure Laterality Date    BREAST EXCISIONAL BIOPSY Left 1996    benign    BREAST SURGERY      CORONARY ANGIOPLASTY WITH STENT PLACEMENT      GANGLION CYST EXCISION Right 12/08/2016    Procedure: EXCISION GANGLION CYST right first dorsal extensor compartment;  Surgeon: Clifton Kan MD;  Location:  MAIN OR;  Service:     MO HALLUX RIGIDUS W/CHEILECTOMY 1ST MP JT W/O IMPLT Right 12/14/2021    Procedure: right index finger MUCOID CYST excision WITH CHEILECTOMY;  Surgeon: Clifton Kan MD;  Location:  MAIN OR;  Service: Orthopedics    MO INCISION EXTENSOR TENDON SHEATH WRIST Right 12/08/2016    Procedure: DEQUERVAINS RELEASE;  Surgeon: Clifton Kan MD;  Location: [facility];  Service: Orthopedics    TONSILLECTOMY        Social History:     Social History     Socioeconomic History    Marital status: /Civil Union     Spouse name: None    Number of children: None    Years of education: None    Highest education level: None   Occupational History    None   Tobacco Use    Smoking status: Never    Smokeless tobacco: Never    Tobacco comments:     Never have inhaled any type of tobacco or anything else   Vaping Use    Vaping status: Never Used   Substance and Sexual Activity    Alcohol use: No    Drug use: Never    Sexual activity: Not Currently     Partners: Female     Birth control/protection: Post-menopausal     Comment: Never have taken any type of birth control medication   Other Topics Concern    None   Social History Narrative    None     Social Drivers of Health     Financial Resource Strain: Not on file   Food Insecurity: Not on file   Transportation Needs: Not on file   Physical Activity: Not on file   Stress: Not on file   Social Connections: Not on file   Intimate Partner Violence: Not on file   Housing Stability: Not on file      Family History:     Family History   Problem Relation Age of Onset    Dementia Mother          Showed symptoms around 92 years old    Heart disease Father     Cancer Father     Hyperlipidemia Sister     Thyroid disease Sister     Anxiety disorder Sister     No Known Problems Sister     Stroke Brother         Mini stroke    No Known Problems Daughter     No Known Problems Daughter     No Known Problems Daughter     Heart failure Maternal Grandmother     No Known Problems Maternal Grandfather     No Known Problems Paternal Grandmother     No Known Problems Paternal Grandfather     No Known Problems Maternal Aunt     No Known Problems Maternal Aunt     No Known Problems Maternal Aunt     No Known Problems Maternal Aunt     Breast cancer Paternal Aunt         unknown primary with mets to breast-unknown age    Cancer Paternal Aunt 68        hip-bone    Lung cancer Paternal Aunt         heavy smoker-unknown age    COPD Sister         Smoker since the age of 16      Current Medications:     Current Outpatient Medications   Medication Sig Dispense Refill    Acetylcysteine (NAC PO) Take by mouth 2 (two) times a day      aspirin 81 MG tablet Take 81 mg by mouth daily      atenolol (TENORMIN) 25 mg tablet Take 1.5 tablets (37.5 mg total) by mouth daily 135 tablet 3    Biotin 10 MG CAPS       cholecalciferol (VITAMIN D3) 1,000 units tablet Take by mouth      Crestor 20 MG tablet Take 1 tablet (20 mg total) by mouth daily Brand necessary 32 tablet 5    Cyanocobalamin (Vitamin B 12) 500 MCG TABS       Evolocumab (Repatha SureClick) 140 MG/ML SOAJ Inject 140 mg under the skin      LORazepam (ATIVAN) 0.5 mg tablet take 1 tablet by mouth at bedtime 30 tablet 0    Lutein 10 MG TABS 40 mg       nitroglycerin (Nitrostat) 0.4 mg SL tablet Place 1 tablet (0.4 mg total) under the tongue every 5 (five) minutes as needed for chest pain 30 tablet 0    Synthroid 75 MCG tablet Take 1 tablet (75 mcg total) by mouth daily in the early morning 90 tablet 1    tretinoin (RETIN-A) 0.025 % cream Apply cream twice a day. 30 g 3     No  current facility-administered medications for this visit.      Allergies:     Allergies   Allergen Reactions    Augmentin [Amoxicillin-Pot Clavulanate] Diarrhea and Abdominal Pain     Patient was prescribed this medication again in 7/23 and was unable to tolerate.    Tetracaine      Other reaction(s): Other (See Comments)  Eye Irritation, Burning  Other reaction(s): Other (See Comments)  Eye Irritation, Burning    Benzalkonium Chloride Other (See Comments)     Eye redness and pain      Physical Exam:     /74 (BP Location: Left arm, Patient Position: Sitting, Cuff Size: Standard)   Pulse 72   Temp 98.7 °F (37.1 °C) (Tympanic)   Wt 59 kg (130 lb)   SpO2 98%   BMI 22.31 kg/m²     Physical Exam  Vitals and nursing note reviewed.   Constitutional:       Appearance: She is well-developed.   HENT:      Head: Normocephalic and atraumatic.      Right Ear: External ear normal.      Left Ear: External ear normal.      Nose: Nose normal.   Eyes:      Conjunctiva/sclera: Conjunctivae normal.      Pupils: Pupils are equal, round, and reactive to light.   Cardiovascular:      Rate and Rhythm: Normal rate and regular rhythm.      Heart sounds: Normal heart sounds.   Pulmonary:      Effort: Pulmonary effort is normal.      Breath sounds: Normal breath sounds.   Abdominal:      General: Bowel sounds are normal.      Palpations: Abdomen is soft.   Musculoskeletal:         General: Normal range of motion.      Cervical back: Normal range of motion and neck supple.   Skin:     General: Skin is warm and dry.   Neurological:      Mental Status: She is alert and oriented to person, place, and time.   Psychiatric:         Behavior: Behavior normal.         Thought Content: Thought content normal.         Judgment: Judgment normal.          Alexandra Miranda [facility]

## 2025-04-06 NOTE — PATIENT INSTRUCTIONS
"Blood work in 6 months  Patient Education     Routine physical for adults   The Basics   Written by the doctors and editors at [facility]   What is a physical? -- A physical is a routine visit, or \"check-up,\" with your doctor. You might also hear it called a \"wellness visit\" or \"preventive visit.\"  During each visit, the doctor will:   Ask about your physical and mental health   Ask about your habits, behaviors, and lifestyle   Do an exam   Give you vaccines if needed   Talk to you about any medicines you take   Give advice about your health   Answer your questions  Getting regular check-ups is an important part of taking care of your health. It can help your doctor find and treat any problems you have. But it's also important for preventing health problems.  A routine physical is different from a \"sick visit.\" A sick visit is when you see a doctor because of a health concern or problem. Since physicals are scheduled ahead of time, you can think about what you want to ask the doctor.  How often should I get a physical? -- It depends on your age and health. In general, for people age 21 years and older:   If you are younger than 50 years, you might be able to get a physical every 3 years.   If you are 50 years or older, your doctor might recommend a physical every year.  If you have an ongoing health condition, like diabetes or high blood pressure, your doctor will probably want to see you more often.  What happens during a physical? -- In general, each visit will include:   Physical exam - The doctor or nurse will check your height, weight, heart rate, and blood pressure. They will also look at your eyes and ears. They will ask about how you are feeling and whether you have any symptoms that bother you.   Medicines - It's a good idea to bring a list of all the medicines you take to each doctor visit. Your doctor will talk to you about your medicines and answer any questions. Tell them if you are having any side " "effects that bother you. You should also tell them if you are having trouble paying for any of your medicines.   Habits and behaviors - This includes:   Your diet   Your exercise habits   Whether you smoke, drink alcohol, or use drugs   Whether you are sexually active   Whether you feel safe at home  Your doctor will talk to you about things you can do to improve your health and lower your risk of health problems. They will also offer help and support. For example, if you want to quit smoking, they can give you advice and might prescribe medicines. If you want to improve your diet or get more physical activity, they can help you with this, too.   Lab tests, if needed - The tests you get will depend on your age and situation. For example, your doctor might want to check your:   Cholesterol   Blood sugar   Iron level   Vaccines - The recommended vaccines will depend on your age, health, and what vaccines you already had. Vaccines are very important because they can prevent certain serious or deadly infections.   Discussion of screening - \"Screening\" means checking for diseases or other health problems before they cause symptoms. Your doctor can recommend screening based on your age, risk, and preferences. This might include tests to check for:   Cancer, such as breast, prostate, cervical, ovarian, colorectal, prostate, lung, or skin cancer   Sexually transmitted infections, such as chlamydia and gonorrhea   Mental health conditions like depression and anxiety  Your doctor will talk to you about the different types of screening tests. They can help you decide which screenings to have. They can also explain what the results might mean.   Answering questions - The physical is a good time to ask the doctor or nurse questions about your health. If needed, they can refer you to other doctors or specialists, too.  Adults older than 65 years often need other care, too. As you get older, your doctor will talk to you about:   " How to prevent falling at home   Hearing or vision tests   Memory testing   How to take your medicines safely   Making sure that you have the help and support you need at home  All topics are updated as new evidence becomes available and our peer review process is complete.  This topic retrieved from [employer] on: May 02, 2024.  Topic 776010 Version 1.0  Release: 32.4.3 - C32.122  © 2024 UpToDate, Inc. and/or its affiliates. All rights reserved.  Consumer Information Use and Disclaimer   Disclaimer: This generalized information is a limited summary of diagnosis, treatment, and/or medication information. It is not meant to be comprehensive and should be used as a tool to help the user understand and/or assess potential diagnostic and treatment options. It does NOT include all information about conditions, treatments, medications, side effects, or risks that may apply to a specific patient. It is not intended to be medical advice or a substitute for the medical advice, diagnosis, or treatment of a health care provider based on the health care provider's examination and assessment of a patient's specific and unique circumstances. Patients must speak with a health care provider for complete information about their health, medical questions, and treatment options, including any risks or benefits regarding use of medications. This information does not endorse any treatments or medications as safe, effective, or approved for treating a specific patient. UpToDate, Inc. and its affiliates disclaim any warranty or liability relating to this information or the use thereof.The use of this information is governed by the Terms of Use, available at https://www.wolterskluwer.com/en/know/clinical-effectiveness-terms. 2024© UpToDate, Inc. and its affiliates and/or licensors. All rights reserved.  Copyright   © 2024 UpToDate, Inc. and/or its affiliates. All rights reserved.

## 2025-04-15 LAB
APOB+LDLR+PCSK9 GENE MUT ANL BLD/T: NOT DETECTED
BRCA1+BRCA2 DEL+DUP + FULL MUT ANL BLD/T: NOT DETECTED
MLH1+MSH2+MSH6+PMS2 GN DEL+DUP+FUL M: NOT DETECTED

## 2025-04-23 DIAGNOSIS — F41.9 ANXIETY DISORDER, UNSPECIFIED TYPE: ICD-10-CM

## 2025-04-23 RX ORDER — LORAZEPAM 0.5 MG/1
0.5 TABLET ORAL
Qty: 30 TABLET | Refills: 0 | Status: SHIPPED | OUTPATIENT
Start: 2025-04-23

## 2025-04-24 ENCOUNTER — PATIENT MESSAGE (OUTPATIENT)
Age: 70
End: 2025-04-24

## 2025-04-25 NOTE — PATIENT COMMUNICATION
Rec'd return call from patient.    Scheduled follow up for 4/28/2025 with [facility] in [place] at 10:20 pm.    Patient aware of office location.  Patient aware to arrive 15 minutes prior.  Confirmed [employer] health coverage.

## 2025-04-25 NOTE — TELEPHONE ENCOUNTER
We can either book with rowdy or [facility]uleyma have openings beginning of May. I called pt no ans. Lvm stating shiller schedule is in June but she has the option to see either [facility] or [facility].

## 2025-04-28 ENCOUNTER — OFFICE VISIT (OUTPATIENT)
Age: 70
End: 2025-04-28
Payer: COMMERCIAL

## 2025-04-28 VITALS — TEMPERATURE: 97.1 F | WEIGHT: 130 LBS | BODY MASS INDEX: 22.31 KG/M2

## 2025-04-28 DIAGNOSIS — D48.9 NEOPLASM OF UNCERTAIN BEHAVIOR: Primary | ICD-10-CM

## 2025-04-28 PROCEDURE — 88341 IMHCHEM/IMCYTCHM EA ADD ANTB: CPT | Performed by: STUDENT IN AN ORGANIZED HEALTH CARE EDUCATION/TRAINING PROGRAM

## 2025-04-28 PROCEDURE — 88305 TISSUE EXAM BY PATHOLOGIST: CPT | Performed by: STUDENT IN AN ORGANIZED HEALTH CARE EDUCATION/TRAINING PROGRAM

## 2025-04-28 PROCEDURE — 88342 IMHCHEM/IMCYTCHM 1ST ANTB: CPT | Performed by: STUDENT IN AN ORGANIZED HEALTH CARE EDUCATION/TRAINING PROGRAM

## 2025-04-28 PROCEDURE — 11102 TANGNTL BX SKIN SINGLE LES: CPT | Performed by: NURSE PRACTITIONER

## 2025-04-28 PROCEDURE — 99214 OFFICE O/P EST MOD 30 MIN: CPT | Performed by: NURSE PRACTITIONER

## 2025-04-28 NOTE — PROGRESS NOTES
"[facility] Note     Patient Name: Shakira Trejo  Encounter Date: 4/28/2025       Have you been cared for by a [facility] D[facility]atologist in the last 3 years and, if so, which description applies to you? Yes. I have been here within the last 3 years, and my medical history has NOT changed since that time.    REVIEW OF SYSTEMS:  Have you recently had or currently have any of the following? No changes in my recent health.   PAST MEDICAL HISTORY:  Have you personally ever had or currently have any of the following?  If \"YES,\" then please provide more detail. No changes in my medical history.   HISTORY OF IMMUNOSUPPRESSION: Do you have a history of any of the following:  Systemic Immunosuppression such as Diabetes, Biologic or Immunotherapy, Chemotherapy, Organ Transplantation, Bone Marrow Transplantation or Prednisone?  No     Answering \"YES\" requires the addition of the dotphrase \"IMMUNOSUPPRESSED\" as the first diagnosis of the patient's visit.   FAMILY HISTORY:  Any \"first degree relatives\" (parent, brother, sister, or child) with the following?    No changes in my family's known health.   PATIENT EXPERIENCE:    Do you want the Dermatologist to perform a COMPLETE skin exam today including a clinical examination under the \"bra and underwear\" areas?  NO  If necessary, do we have your permission to call and leave a detailed message on your Preferred Phone number that includes your specific medical information?  Yes      Allergies   Allergen Reactions    Augmentin [Amoxicillin-Pot Clavulanate] Diarrhea and Abdominal Pain     Patient was prescribed this medication again in 7/23 and was unable to tolerate.    Tetracaine      Other reaction(s): Other (See Comments)  Eye Irritation, Burning  Other reaction(s): Other (See Comments)  Eye Irritation, Burning    Benzalkonium Chloride Other (See Comments)     Eye redness and pain      Current Outpatient Medications:     Acetylcysteine (NAC PO), Take by mouth 2 " "(two) times a day, Disp: , Rfl:     aspirin 81 MG tablet, Take 81 mg by mouth daily, Disp: , Rfl:     atenolol (TENORMIN) 25 mg tablet, Take 1.5 tablets (37.5 mg total) by mouth daily, Disp: 135 tablet, Rfl: 3    Biotin 10 MG CAPS, , Disp: , Rfl:     cholecalciferol (VITAMIN D3) 1,000 units tablet, Take by mouth, Disp: , Rfl:     Crestor 20 MG tablet, Take 1 tablet (20 mg total) by mouth daily Brand necessary, Disp: 32 tablet, Rfl: 5    Cyanocobalamin (Vitamin B 12) 500 MCG TABS, , Disp: , Rfl:     LORazepam (ATIVAN) 0.5 mg tablet, take 1 tablet by mouth at bedtime, Disp: 30 tablet, Rfl: 0    Lutein 10 MG TABS, 40 mg , Disp: , Rfl:     nitroglycerin (Nitrostat) 0.4 mg SL tablet, Place 1 tablet (0.4 mg total) under the tongue every 5 (five) minutes as needed for chest pain, Disp: 30 tablet, Rfl: 0    Synthroid 75 MCG tablet, Take 1 tablet (75 mcg total) by mouth daily in the early morning, Disp: 90 tablet, Rfl: 1    tretinoin (RETIN-A) 0.025 % cream, Apply cream twice a day., Disp: 30 g, Rfl: 3              Whom besides the patient is providing clinical information about today's encounter?   NO ADDITIONAL HISTORIAN (patient alone provided history)    Physical Exam and Assessment/Plan by Diagnosis:    NEOPLASM OF UNCERTAIN BEHAVIOR OF SKIN    Physical Exam:  (Anatomic Location); (Size and Morphological Description); (Differential Diagnosis):  Specimen A; skin; shave; left anterior neck; 3 mm x 2 mm pink pedunculated papule w/ crust; DDX; inflamed nevi vs skin tag vs atypia         Additional History of Present Condition:  Patient reports spot has been present most of her life , but has recently been growing and is painful.     Assessment and Plan:  I have discussed with the patient that a sample of skin via a \"skin biopsy” would be potentially helpful to further make a specific diagnosis under the microscope.  Based on a thorough discussion of this condition and the management approach to it (including a comprehensive " discussion of the known risks, side effects and potential benefits of treatment), the patient (family) agrees to implement the following specific plan:    Procedure:  Skin Biopsy.  After a thorough discussion of treatment options and risk/benefits/alternatives (including but not limited to local pain, scarring, dyspigmentation, blistering, possible superinfection, and inability to confirm a diagnosis via histopathology), verbal and written consent were obtained and portion of the rash was biopsied for tissue sample.  See below for consent that was obtained from patient and subsequent Procedure Note.  PROCEDURE TANGENTIAL (SHAVE) BIOPSY NOTE:    Performing Physician:  JAMES Alan  Anatomic Location; Clinical Description with size (cm); Pre-Op Diagnosis:   Specimen A; skin; shave; left anterior neck; 3 mm x 2 mm pink pedunculated papule w/ crust; DDX; inflamed nevi vs skin tag vs atypia   Post-op diagnosis: Same     Local anesthesia: 1% xylocaine with epi      Topical anesthesia: None    Hemostasis: Aluminum chloride       After obtaining informed consent  at which time there was a discussion about the purpose of biopsy  and low risks of infection and bleeding.  The area was prepped and draped in the usual fashion. Anesthesia was obtained with 1% lidocaine with epinephrine. A shave biopsy to an appropriate sampling depth was obtained by Shave (Dermablade or 15 blade) The resulting wound was covered with surgical ointment and bandaged appropriately.     The patient tolerated the procedure well without complications and was without signs of functional compromise.      Specimen has been sent for review by Dermatopathology.    Standard post-procedure care has been explained and has been included in written form within the patient's copy of Informed Consent.    INFORMED CONSENT DISCUSSION AND POST-OPERATIVE INSTRUCTIONS FOR PATIENT    I.  RATIONALE FOR PROCEDURE  I understand that a skin biopsy allows the  "Dermatologist to test a lesion or rash under the microscope to obtain a diagnosis.  It usually involves numbing the area with numbing medication and removing a small piece of skin; sometimes the area will be closed with sutures. In this specific procedure, sutures are not usually needed.  If any sutures are placed, then they are usually need to be removed in 2 weeks or less.    I understand that my Dermatologist recommends that a skin \"shave\" biopsy be performed today.  A local anesthetic, similar to the kind that a dentist uses when filling a cavity, will be injected with a very small needle into the skin area to be sampled.  The injected skin and tissue underneath \"will go to sleep” and become numb so no pain should be felt afterwards.  An instrument shaped like a tiny \"razor blade\" (shave biopsy instrument) will be used to cut a small piece of tissue and skin from the area so that a sample of tissue can be taken and examined more closely under the microscope.  A slight amount of bleeding will occur, but it will be stopped with direct pressure and a pressure bandage and any other appropriate methods.  I understands that a scar will form where the wound was created.  Surgical ointment will be applied to help protect the wound.  Sutures are not usually needed.    II.  RISKS AND POTENTIAL COMPLICATIONS   I understand the risks and potential complications of a skin biopsy include but are not limited to the following:  Bleeding  Infection  Pain  Scar/keloid  Skin discoloration  Incomplete Removal  Recurrence  Nerve Damage/Numbness/Loss of Function  Allergic Reaction to Anesthesia  Biopsies are diagnostic procedures and based on findings additional treatment or evaluation may be required  Loss or destruction of specimen resulting in no additional findings    My Dermatologist has explained to me the nature of the condition, the nature of the procedure, and the benefits to be reasonably expected compared with alternative " "approaches.  My Dermatologist has discussed the likelihood of major risks or complications of this procedure including the specific risks listed above, such as bleeding, infection, and scarring/keloid.  I understand that a scar is expected after this procedure.  I understand that my physician cannot predict if the scar will form a \"keloid,\" which extends beyond the borders of the wound that is created.  A keloid is a thick, painful, and bumpy scar.  A keloid can be difficult to treat, as it does not always respond well to therapy, which includes injecting cortisone directly into the keloid every few weeks.  While this usually reduces the pain and size of the scar, it does not eliminate it.      I understand that photographs may be taken before and after the procedure.  These will be maintained as part of the medical providers confidential records and may not be made available to me.  I further authorize the medical provider to use the photographs for teaching purposes or to illustrate scientific papers, books, or lectures if in his/her judgment, medical research, education, or science may benefit from its use.    I have had an opportunity to fully inquire about the risks and benefits of this procedure and its alternatives.   I have been given ample time and opportunity to ask questions and to seek a second opinion if I wished to do so.  I acknowledge that there have specifically been no guarantees as to the cosmetic results from the procedure.  I am aware that with any procedure there is always the possibility of an unexpected complication.    III. POST-PROCEDURAL CARE (WHAT YOU WILL NEED TO DO \"AFTER THE BIOPSY\" TO OPTIMIZE HEALING)    Keep the area clean and dry.  Try NOT to remove the bandage or get it wet for the first 24 hours.    Gently clean the area and apply surgical ointment (such as Vaseline petrolatum ointment, which is available \"over the counter\" and not a prescription) to the biopsy site for up to 2 " weeks straight.  This acts to protect the wound from the outside world.      Sutures are not usually placed in this procedure.  If any sutures were placed, return for suture removal as instructed (generally 1 week for the face, 2 weeks for the body).      Take Acetaminophen (Tylenol) for discomfort, if no contraindications.  Ibuprofen or aspirin could make bleeding worse.    Call our office immediately for signs of infection: fever, chills, increased redness, warmth, tenderness, discomfort/pain, or pus or foul smell coming from the wound.    WHAT TO DO IF THERE IS ANY BLEEDING?  If a small amount of bleeding is noticed, place a clean cloth over the area and apply firm pressure for ten minutes.  Check the wound after 10 minutes of direct pressure.  If bleeding persists, try one more time for an additional 10 minutes of direct pressure on the area.  If the bleeding becomes heavier or does not stop after the second attempt, or if you have any other questions about this procedure, then please call your [facility]'s Dermatologist by calling [phone] (SKIN).     I hereby acknowledge that I have reviewed and verified the site with my Dermatologist and have requested and authorized my Dermatologist to proceed with the procedure.    Scribe Attestation      I,:  Zaida Castaneda MA am acting as a scribe while in the presence of the attending physician.:       I,:  JAMES Cheema personally performed the services described in this documentation    as scribed in my presence.:

## 2025-04-29 ENCOUNTER — HOSPITAL ENCOUNTER (OUTPATIENT)
Dept: MAMMOGRAPHY | Facility: CLINIC | Age: 70
Discharge: HOME | End: 2025-04-29
Payer: COMMERCIAL

## 2025-04-29 VITALS — WEIGHT: 130 LBS | HEIGHT: 64 IN | BODY MASS INDEX: 22.2 KG/M2

## 2025-04-29 DIAGNOSIS — Z12.31 ENCOUNTER FOR SCREENING MAMMOGRAM FOR BREAST CANCER: ICD-10-CM

## 2025-04-29 PROCEDURE — 77067 SCR MAMMO BI INCL CAD: CPT

## 2025-04-29 PROCEDURE — 77063 BREAST TOMOSYNTHESIS BI: CPT

## 2025-05-01 ENCOUNTER — RESULTS FOLLOW-UP (OUTPATIENT)
Dept: DERMATOLOGY | Facility: CLINIC | Age: 70
End: 2025-05-01

## 2025-05-01 ENCOUNTER — TELEPHONE (OUTPATIENT)
Dept: DERMATOLOGY | Facility: CLINIC | Age: 70
End: 2025-05-01

## 2025-05-01 PROCEDURE — 88305 TISSUE EXAM BY PATHOLOGIST: CPT | Performed by: STUDENT IN AN ORGANIZED HEALTH CARE EDUCATION/TRAINING PROGRAM

## 2025-05-01 PROCEDURE — 88342 IMHCHEM/IMCYTCHM 1ST ANTB: CPT | Performed by: STUDENT IN AN ORGANIZED HEALTH CARE EDUCATION/TRAINING PROGRAM

## 2025-05-01 PROCEDURE — 88341 IMHCHEM/IMCYTCHM EA ADD ANTB: CPT | Performed by: STUDENT IN AN ORGANIZED HEALTH CARE EDUCATION/TRAINING PROGRAM

## 2025-05-01 NOTE — RESULT ENCOUNTER NOTE
DERMATOPATHOLOGY RESULT NOTE    Results reviewed by ordering physician.  Please call and let patient know results were benign.  Contact office with any recurrence or new concerns.      Instructions for Clinical Derm Team:   (remember to route Result Note to appropriate staff):    None    Result & Plan by Specimen:    Specimen A: benign  Plan: monitor and reassured, benign    Tissue Exam: [id number]  Order: [id number]   Status: Final result      Dx: Neoplasm of uncertain behavior    Test Result Released: Yes (seen)    View Follow-Up Encounter     Component  Ref Range & Units (hover)   Case Report  Surgical Pathology Report                         Case: [id number]                                  Authorizing Provider:  JAMES Cheema Collected:           04/28/2025 1038              Ordering Location:     [facility]      Received:            04/28/2025 [address] [place] [address]                                                                  Pathologist:           Ashley Costello MD                                                          Specimen:    Skin, Other, Specimen A; left anterior neck                                              Final Diagnosis  A. Skin, left anterior neck, shave biopsy:    Dermal melanocytic nevus; extends to the tissue edges.      Electronically signed by Ashley Costello MD on 5/1/2025 at 1210 EDT

## 2025-05-01 NOTE — TELEPHONE ENCOUNTER
Misha Holcomb; phone call to patient to advise BX report came back benign nevi; patient understood

## 2025-05-04 PROBLEM — J10.1 INFLUENZA A: Status: RESOLVED | Noted: 2025-03-26 | Resolved: 2025-05-04

## 2025-06-03 ENCOUNTER — TELEPHONE (OUTPATIENT)
Dept: FAMILY MEDICINE CLINIC | Facility: CLINIC | Age: 70
End: 2025-06-03

## 2025-06-03 NOTE — TELEPHONE ENCOUNTER
Alexandra Miranda DO to Me (Selected Message)        6/2/25  5:25 PM  Medication needs prior auth=[facility]    6/2/25 11:26 AM  Emma Gonsalez routed this conversation to Alexandra Miranda DO    6/2/25 11:17 AM  Clifford Alvarez, MEENA routed this conversation to [facility]  Shakira Trejo to  Primary Care [facility] (supporting Alexandra Miranda DO)  SR      6/1/25  8:48 AM  Silvino Miranda it is time again to get free certification for my brand [employer] [facility] prescription. Can you please start that process of July? It will end for the previous year just a reminder that the quantity is 32. When it is written for 32 as it has been, it only cost me three dollars for the brand. Could you please keep me posted on the status. Thank you

## 2025-06-09 NOTE — TELEPHONE ENCOUNTER
PA for Crestor 20 MG tablet APPROVED     Date(s) approved 06/03/25-06/09/26    Case # [id number]    Patient advised by          [][employer]hart Message  []Phone call   []LMOM  []L/M to call office as no active Communication consent on file  [x]Patient messaged office stating insurance called notifying patient of approval       Pharmacy advised by    [x]Fax  []Phone call  []Secure Chat       Approval letter scanned into [employer] , not available at time of approval

## 2025-06-26 ENCOUNTER — PATIENT MESSAGE (OUTPATIENT)
Age: 70
End: 2025-06-26

## 2025-06-30 DIAGNOSIS — L81.4 LENTIGO: ICD-10-CM

## 2025-06-30 RX ORDER — TRETINOIN 0.5 MG/G
CREAM TOPICAL
Qty: 30 G | Refills: 3 | Status: SHIPPED | OUTPATIENT
Start: 2025-06-30

## 2025-07-17 DIAGNOSIS — E78.2 MIXED HYPERLIPIDEMIA: ICD-10-CM

## 2025-07-18 RX ORDER — ROSUVASTATIN CALCIUM 20 MG/1
20 TABLET, FILM COATED ORAL DAILY
Qty: 32 TABLET | Refills: 5 | Status: SHIPPED | OUTPATIENT
Start: 2025-07-18

## 2025-08-01 DIAGNOSIS — F41.9 ANXIETY DISORDER, UNSPECIFIED TYPE: ICD-10-CM

## 2025-08-01 RX ORDER — LORAZEPAM 0.5 MG/1
0.5 TABLET ORAL
Qty: 30 TABLET | Refills: 0 | Status: SHIPPED | OUTPATIENT
Start: 2025-08-01

## 2025-08-05 ENCOUNTER — TELEPHONE (OUTPATIENT)
Dept: OBGYN CLINIC | Facility: HOSPITAL | Age: 70
End: 2025-08-05

## 2025-08-21 DIAGNOSIS — R53.82 CHRONIC FATIGUE: ICD-10-CM

## 2025-08-21 DIAGNOSIS — I25.10 ARTERIOSCLEROSIS OF CORONARY ARTERY: Primary | ICD-10-CM

## 2025-08-21 DIAGNOSIS — E03.9 ACQUIRED HYPOTHYROIDISM: ICD-10-CM

## 2025-08-21 DIAGNOSIS — E78.2 MIXED HYPERLIPIDEMIA: ICD-10-CM

## 2025-08-21 DIAGNOSIS — R73.09 ELEVATED GLUCOSE: ICD-10-CM

## 2025-08-21 DIAGNOSIS — E78.41 ELEVATED LIPOPROTEIN(A): Chronic | ICD-10-CM

## (undated) DEVICE — STERILE BETHLEHEM PLASTIC HAND: Brand: CARDINAL HEALTH

## (undated) DEVICE — GLOVE INDICATOR PI UNDERGLOVE SZ 8 BLUE

## (undated) DEVICE — GAUZE SPONGES,16 PLY: Brand: CURITY

## (undated) DEVICE — DISPOSABLE EQUIPMENT COVER: Brand: SMALL TOWEL DRAPE

## (undated) DEVICE — SUT PROLENE 4-0 PS-2 18 IN 8682G

## (undated) DEVICE — GLOVE SRG BIOGEL 7.5

## (undated) DEVICE — SPONGE PVP SCRUB WING STERILE

## (undated) DEVICE — OCCLUSIVE GAUZE STRIP,3% BISMUTH TRIBROMOPHENATE IN PETROLATUM BLEND: Brand: XEROFORM

## (undated) DEVICE — DRSG MED WND FNGR TOE